# Patient Record
Sex: FEMALE | Race: WHITE | Employment: OTHER | URBAN - METROPOLITAN AREA
[De-identification: names, ages, dates, MRNs, and addresses within clinical notes are randomized per-mention and may not be internally consistent; named-entity substitution may affect disease eponyms.]

---

## 2017-03-14 ENCOUNTER — OFFICE VISIT (OUTPATIENT)
Dept: ONCOLOGY | Age: 67
End: 2017-03-14
Payer: MEDICARE

## 2017-03-14 ENCOUNTER — HOSPITAL ENCOUNTER (OUTPATIENT)
Facility: MEDICAL CENTER | Age: 67
Discharge: HOME | End: 2017-03-14
Payer: MEDICARE

## 2017-03-14 VITALS
DIASTOLIC BLOOD PRESSURE: 83 MMHG | WEIGHT: 178 LBS | RESPIRATION RATE: 16 BRPM | BODY MASS INDEX: 31.15 KG/M2 | HEART RATE: 61 BPM | TEMPERATURE: 97.6 F | SYSTOLIC BLOOD PRESSURE: 147 MMHG

## 2017-03-14 DIAGNOSIS — Z90.13 S/P MASTECTOMY, BILATERAL: Primary | ICD-10-CM

## 2017-03-14 DIAGNOSIS — C50.212 MALIGNANT NEOPLASM OF UPPER-INNER QUADRANT OF LEFT FEMALE BREAST (HCC): ICD-10-CM

## 2017-03-14 DIAGNOSIS — M85.80 OSTEOPENIA: ICD-10-CM

## 2017-03-14 PROCEDURE — G8417 CALC BMI ABV UP PARAM F/U: HCPCS | Performed by: INTERNAL MEDICINE

## 2017-03-14 PROCEDURE — 1036F TOBACCO NON-USER: CPT | Performed by: INTERNAL MEDICINE

## 2017-03-14 PROCEDURE — G8399 PT W/DXA RESULTS DOCUMENT: HCPCS | Performed by: INTERNAL MEDICINE

## 2017-03-14 PROCEDURE — 1123F ACP DISCUSS/DSCN MKR DOCD: CPT | Performed by: INTERNAL MEDICINE

## 2017-03-14 PROCEDURE — G8484 FLU IMMUNIZE NO ADMIN: HCPCS | Performed by: INTERNAL MEDICINE

## 2017-03-14 PROCEDURE — 4040F PNEUMOC VAC/ADMIN/RCVD: CPT | Performed by: INTERNAL MEDICINE

## 2017-03-14 PROCEDURE — G8428 CUR MEDS NOT DOCUMENT: HCPCS | Performed by: INTERNAL MEDICINE

## 2017-03-14 PROCEDURE — 3014F SCREEN MAMMO DOC REV: CPT | Performed by: INTERNAL MEDICINE

## 2017-03-14 PROCEDURE — 1090F PRES/ABSN URINE INCON ASSESS: CPT | Performed by: INTERNAL MEDICINE

## 2017-03-14 PROCEDURE — 3017F COLORECTAL CA SCREEN DOC REV: CPT | Performed by: INTERNAL MEDICINE

## 2017-03-14 PROCEDURE — 99214 OFFICE O/P EST MOD 30 MIN: CPT | Performed by: INTERNAL MEDICINE

## 2017-03-14 RX ORDER — ANASTROZOLE 1 MG/1
1 TABLET ORAL DAILY
Qty: 90 TABLET | Refills: 1 | Status: SHIPPED | OUTPATIENT
Start: 2017-03-14 | End: 2017-07-11 | Stop reason: SDUPTHER

## 2017-03-14 RX ORDER — PHYTONADIONE 5 MG/1
5 TABLET ORAL ONCE
COMMUNITY
End: 2018-07-02 | Stop reason: ALTCHOICE

## 2017-03-14 RX ORDER — SULFAMETHOXAZOLE AND TRIMETHOPRIM 800; 160 MG/1; MG/1
1 TABLET ORAL 2 TIMES DAILY
Qty: 20 TABLET | Refills: 0 | Status: SHIPPED | OUTPATIENT
Start: 2017-03-14 | End: 2017-03-24

## 2017-07-07 ENCOUNTER — HOSPITAL ENCOUNTER (OUTPATIENT)
Age: 67
Discharge: HOME | End: 2017-07-07
Payer: MEDICARE

## 2017-07-07 DIAGNOSIS — C50.212 MALIGNANT NEOPLASM OF UPPER-INNER QUADRANT OF LEFT FEMALE BREAST (HCC): ICD-10-CM

## 2017-07-07 LAB
ABSOLUTE EOS #: 0.3 K/UL (ref 0–0.4)
ABSOLUTE LYMPH #: 2.4 K/UL (ref 1–4.8)
ABSOLUTE MONO #: 0.6 K/UL (ref 0.1–1.3)
ALBUMIN SERPL-MCNC: 4 G/DL (ref 3.5–5.2)
ALBUMIN/GLOBULIN RATIO: ABNORMAL (ref 1–2.5)
ALP BLD-CCNC: 97 U/L (ref 35–104)
ALT SERPL-CCNC: 36 U/L (ref 5–33)
ANION GAP SERPL CALCULATED.3IONS-SCNC: 16 MMOL/L (ref 9–17)
AST SERPL-CCNC: 27 U/L
BASOPHILS # BLD: 1 %
BASOPHILS ABSOLUTE: 0.1 K/UL (ref 0–0.2)
BILIRUB SERPL-MCNC: 0.22 MG/DL (ref 0.3–1.2)
BUN BLDV-MCNC: 25 MG/DL (ref 8–23)
BUN/CREAT BLD: ABNORMAL (ref 9–20)
CALCIUM SERPL-MCNC: 9.5 MG/DL (ref 8.6–10.4)
CHLORIDE BLD-SCNC: 103 MMOL/L (ref 98–107)
CO2: 22 MMOL/L (ref 20–31)
CREAT SERPL-MCNC: 0.63 MG/DL (ref 0.5–0.9)
DIFFERENTIAL TYPE: NORMAL
EOSINOPHILS RELATIVE PERCENT: 3 %
GFR AFRICAN AMERICAN: >60 ML/MIN
GFR NON-AFRICAN AMERICAN: >60 ML/MIN
GFR SERPL CREATININE-BSD FRML MDRD: ABNORMAL ML/MIN/{1.73_M2}
GFR SERPL CREATININE-BSD FRML MDRD: ABNORMAL ML/MIN/{1.73_M2}
GLUCOSE BLD-MCNC: 95 MG/DL (ref 70–99)
HCT VFR BLD CALC: 43.5 % (ref 36–46)
HEMOGLOBIN: 13.7 G/DL (ref 12–16)
LYMPHOCYTES # BLD: 28 %
MCH RBC QN AUTO: 29.9 PG (ref 26–34)
MCHC RBC AUTO-ENTMCNC: 31.6 G/DL (ref 31–37)
MCV RBC AUTO: 94.7 FL (ref 80–100)
MONOCYTES # BLD: 8 %
PDW BLD-RTO: 13.2 % (ref 11.5–14.9)
PLATELET # BLD: 366 K/UL (ref 150–450)
PLATELET ESTIMATE: NORMAL
PMV BLD AUTO: 8.1 FL (ref 6–12)
POTASSIUM SERPL-SCNC: 4.2 MMOL/L (ref 3.7–5.3)
RBC # BLD: 4.59 M/UL (ref 4–5.2)
RBC # BLD: NORMAL 10*6/UL
SEG NEUTROPHILS: 60 %
SEGMENTED NEUTROPHILS ABSOLUTE COUNT: 5.1 K/UL (ref 1.3–9.1)
SODIUM BLD-SCNC: 141 MMOL/L (ref 135–144)
TOTAL PROTEIN: 7.5 G/DL (ref 6.4–8.3)
WBC # BLD: 8.5 K/UL (ref 3.5–11)
WBC # BLD: NORMAL 10*3/UL

## 2017-07-07 PROCEDURE — 36415 COLL VENOUS BLD VENIPUNCTURE: CPT

## 2017-07-07 PROCEDURE — 85025 COMPLETE CBC W/AUTO DIFF WBC: CPT

## 2017-07-07 PROCEDURE — 80053 COMPREHEN METABOLIC PANEL: CPT

## 2017-07-10 PROBLEM — J30.1 SEASONAL ALLERGIC RHINITIS DUE TO POLLEN: Status: ACTIVE | Noted: 2017-07-10

## 2017-07-11 ENCOUNTER — OFFICE VISIT (OUTPATIENT)
Dept: ONCOLOGY | Age: 67
End: 2017-07-11
Payer: MEDICARE

## 2017-07-11 VITALS
TEMPERATURE: 98.4 F | HEART RATE: 50 BPM | RESPIRATION RATE: 16 BRPM | DIASTOLIC BLOOD PRESSURE: 69 MMHG | WEIGHT: 183.5 LBS | HEIGHT: 64 IN | SYSTOLIC BLOOD PRESSURE: 116 MMHG | BODY MASS INDEX: 31.33 KG/M2

## 2017-07-11 DIAGNOSIS — M85.80 OSTEOPENIA: Primary | ICD-10-CM

## 2017-07-11 DIAGNOSIS — C50.212 MALIGNANT NEOPLASM OF UPPER-INNER QUADRANT OF LEFT FEMALE BREAST (HCC): ICD-10-CM

## 2017-07-11 PROCEDURE — G8427 DOCREV CUR MEDS BY ELIG CLIN: HCPCS | Performed by: INTERNAL MEDICINE

## 2017-07-11 PROCEDURE — 1123F ACP DISCUSS/DSCN MKR DOCD: CPT | Performed by: INTERNAL MEDICINE

## 2017-07-11 PROCEDURE — G8399 PT W/DXA RESULTS DOCUMENT: HCPCS | Performed by: INTERNAL MEDICINE

## 2017-07-11 PROCEDURE — 1036F TOBACCO NON-USER: CPT | Performed by: INTERNAL MEDICINE

## 2017-07-11 PROCEDURE — 3014F SCREEN MAMMO DOC REV: CPT | Performed by: INTERNAL MEDICINE

## 2017-07-11 PROCEDURE — G8417 CALC BMI ABV UP PARAM F/U: HCPCS | Performed by: INTERNAL MEDICINE

## 2017-07-11 PROCEDURE — 4040F PNEUMOC VAC/ADMIN/RCVD: CPT | Performed by: INTERNAL MEDICINE

## 2017-07-11 PROCEDURE — 3017F COLORECTAL CA SCREEN DOC REV: CPT | Performed by: INTERNAL MEDICINE

## 2017-07-11 PROCEDURE — 99214 OFFICE O/P EST MOD 30 MIN: CPT | Performed by: INTERNAL MEDICINE

## 2017-07-11 PROCEDURE — 99211 OFF/OP EST MAY X REQ PHY/QHP: CPT | Performed by: NURSE PRACTITIONER

## 2017-07-11 PROCEDURE — 1090F PRES/ABSN URINE INCON ASSESS: CPT | Performed by: INTERNAL MEDICINE

## 2017-07-11 RX ORDER — ANASTROZOLE 1 MG/1
1 TABLET ORAL DAILY
Qty: 90 TABLET | Refills: 3 | Status: SHIPPED | OUTPATIENT
Start: 2017-07-11 | End: 2018-10-08 | Stop reason: SDUPTHER

## 2017-08-22 ENCOUNTER — ANESTHESIA (OUTPATIENT)
Dept: OPERATING ROOM | Age: 67
End: 2017-08-22
Payer: MEDICARE

## 2017-08-22 ENCOUNTER — HOSPITAL ENCOUNTER (OUTPATIENT)
Age: 67
Setting detail: OUTPATIENT SURGERY
Discharge: HOME | End: 2017-08-22
Attending: SURGERY | Admitting: SURGERY
Payer: MEDICARE

## 2017-08-22 ENCOUNTER — ANESTHESIA EVENT (OUTPATIENT)
Dept: OPERATING ROOM | Age: 67
End: 2017-08-22
Payer: MEDICARE

## 2017-08-22 VITALS
TEMPERATURE: 98 F | RESPIRATION RATE: 16 BRPM | HEART RATE: 46 BPM | DIASTOLIC BLOOD PRESSURE: 75 MMHG | HEIGHT: 63 IN | SYSTOLIC BLOOD PRESSURE: 138 MMHG | OXYGEN SATURATION: 98 % | BODY MASS INDEX: 31.01 KG/M2 | WEIGHT: 175 LBS

## 2017-08-22 VITALS — OXYGEN SATURATION: 97 % | SYSTOLIC BLOOD PRESSURE: 106 MMHG | DIASTOLIC BLOOD PRESSURE: 55 MMHG

## 2017-08-22 PROCEDURE — 2500000003 HC RX 250 WO HCPCS: Performed by: ANESTHESIOLOGY

## 2017-08-22 PROCEDURE — 7100000000 HC PACU RECOVERY - FIRST 15 MIN: Performed by: SURGERY

## 2017-08-22 PROCEDURE — 2580000003 HC RX 258: Performed by: ANESTHESIOLOGY

## 2017-08-22 PROCEDURE — 7100000010 HC PHASE II RECOVERY - FIRST 15 MIN: Performed by: SURGERY

## 2017-08-22 PROCEDURE — 7100000031 HC ASPR PHASE II RECOVERY - ADDTL 15 MIN: Performed by: SURGERY

## 2017-08-22 PROCEDURE — 7100000011 HC PHASE II RECOVERY - ADDTL 15 MIN: Performed by: SURGERY

## 2017-08-22 PROCEDURE — 6360000002 HC RX W HCPCS: Performed by: ANESTHESIOLOGY

## 2017-08-22 PROCEDURE — 2580000003 HC RX 258: Performed by: SURGERY

## 2017-08-22 PROCEDURE — 7100000030 HC ASPR PHASE II RECOVERY - FIRST 15 MIN: Performed by: SURGERY

## 2017-08-22 PROCEDURE — 7100000001 HC PACU RECOVERY - ADDTL 15 MIN: Performed by: SURGERY

## 2017-08-22 PROCEDURE — 3609027000 HC COLONOSCOPY: Performed by: SURGERY

## 2017-08-22 PROCEDURE — 3700000000 HC ANESTHESIA ATTENDED CARE: Performed by: SURGERY

## 2017-08-22 PROCEDURE — 3700000001 HC ADD 15 MINUTES (ANESTHESIA): Performed by: SURGERY

## 2017-08-22 RX ORDER — SODIUM CHLORIDE, SODIUM LACTATE, POTASSIUM CHLORIDE, CALCIUM CHLORIDE 600; 310; 30; 20 MG/100ML; MG/100ML; MG/100ML; MG/100ML
INJECTION, SOLUTION INTRAVENOUS CONTINUOUS PRN
Status: DISCONTINUED | OUTPATIENT
Start: 2017-08-22 | End: 2017-08-22 | Stop reason: SDUPTHER

## 2017-08-22 RX ORDER — SODIUM CHLORIDE, SODIUM LACTATE, POTASSIUM CHLORIDE, CALCIUM CHLORIDE 600; 310; 30; 20 MG/100ML; MG/100ML; MG/100ML; MG/100ML
INJECTION, SOLUTION INTRAVENOUS CONTINUOUS
Status: DISCONTINUED | OUTPATIENT
Start: 2017-08-22 | End: 2017-08-22 | Stop reason: HOSPADM

## 2017-08-22 RX ORDER — PROPOFOL 10 MG/ML
INJECTION, EMULSION INTRAVENOUS PRN
Status: DISCONTINUED | OUTPATIENT
Start: 2017-08-22 | End: 2017-08-22 | Stop reason: SDUPTHER

## 2017-08-22 RX ORDER — LIDOCAINE HYDROCHLORIDE 20 MG/ML
INJECTION, SOLUTION EPIDURAL; INFILTRATION; INTRACAUDAL; PERINEURAL PRN
Status: DISCONTINUED | OUTPATIENT
Start: 2017-08-22 | End: 2017-08-22 | Stop reason: SDUPTHER

## 2017-08-22 RX ADMIN — PROPOFOL 25 MG: 10 INJECTION, EMULSION INTRAVENOUS at 10:10

## 2017-08-22 RX ADMIN — SODIUM CHLORIDE, POTASSIUM CHLORIDE, SODIUM LACTATE AND CALCIUM CHLORIDE: 600; 310; 30; 20 INJECTION, SOLUTION INTRAVENOUS at 09:24

## 2017-08-22 RX ADMIN — SODIUM CHLORIDE, POTASSIUM CHLORIDE, SODIUM LACTATE AND CALCIUM CHLORIDE: 600; 310; 30; 20 INJECTION, SOLUTION INTRAVENOUS at 09:56

## 2017-08-22 RX ADMIN — LIDOCAINE HYDROCHLORIDE 2 ML: 20 INJECTION, SOLUTION EPIDURAL; INFILTRATION; INTRACAUDAL; PERINEURAL at 10:04

## 2017-08-22 RX ADMIN — PROPOFOL 50 MG: 10 INJECTION, EMULSION INTRAVENOUS at 10:25

## 2017-08-22 RX ADMIN — PROPOFOL 25 MG: 10 INJECTION, EMULSION INTRAVENOUS at 10:20

## 2017-08-22 RX ADMIN — PROPOFOL 25 MG: 10 INJECTION, EMULSION INTRAVENOUS at 10:05

## 2017-08-22 RX ADMIN — PROPOFOL 100 MG: 10 INJECTION, EMULSION INTRAVENOUS at 10:03

## 2017-08-22 RX ADMIN — PROPOFOL 25 MG: 10 INJECTION, EMULSION INTRAVENOUS at 10:15

## 2017-08-22 RX ADMIN — PROPOFOL 50 MG: 10 INJECTION, EMULSION INTRAVENOUS at 10:30

## 2017-08-22 ASSESSMENT — PAIN SCALES - GENERAL
PAINLEVEL_OUTOF10: 0

## 2017-08-22 ASSESSMENT — PAIN - FUNCTIONAL ASSESSMENT: PAIN_FUNCTIONAL_ASSESSMENT: 0-10

## 2017-10-26 ENCOUNTER — OFFICE VISIT (OUTPATIENT)
Dept: ONCOLOGY | Age: 67
End: 2017-10-26
Payer: MEDICARE

## 2017-10-26 VITALS
DIASTOLIC BLOOD PRESSURE: 67 MMHG | RESPIRATION RATE: 16 BRPM | SYSTOLIC BLOOD PRESSURE: 129 MMHG | TEMPERATURE: 97.1 F | HEART RATE: 52 BPM | WEIGHT: 179.9 LBS | BODY MASS INDEX: 31.88 KG/M2

## 2017-10-26 DIAGNOSIS — C50.212 MALIGNANT NEOPLASM OF UPPER-INNER QUADRANT OF LEFT BREAST IN FEMALE, ESTROGEN RECEPTOR POSITIVE (HCC): Primary | ICD-10-CM

## 2017-10-26 DIAGNOSIS — Z17.0 MALIGNANT NEOPLASM OF UPPER-INNER QUADRANT OF LEFT BREAST IN FEMALE, ESTROGEN RECEPTOR POSITIVE (HCC): Primary | ICD-10-CM

## 2017-10-26 PROCEDURE — 1123F ACP DISCUSS/DSCN MKR DOCD: CPT | Performed by: INTERNAL MEDICINE

## 2017-10-26 PROCEDURE — G8417 CALC BMI ABV UP PARAM F/U: HCPCS | Performed by: INTERNAL MEDICINE

## 2017-10-26 PROCEDURE — G8484 FLU IMMUNIZE NO ADMIN: HCPCS | Performed by: INTERNAL MEDICINE

## 2017-10-26 PROCEDURE — 1036F TOBACCO NON-USER: CPT | Performed by: INTERNAL MEDICINE

## 2017-10-26 PROCEDURE — 99214 OFFICE O/P EST MOD 30 MIN: CPT | Performed by: INTERNAL MEDICINE

## 2017-10-26 PROCEDURE — 3014F SCREEN MAMMO DOC REV: CPT | Performed by: INTERNAL MEDICINE

## 2017-10-26 PROCEDURE — 4040F PNEUMOC VAC/ADMIN/RCVD: CPT | Performed by: INTERNAL MEDICINE

## 2017-10-26 PROCEDURE — 1090F PRES/ABSN URINE INCON ASSESS: CPT | Performed by: INTERNAL MEDICINE

## 2017-10-26 PROCEDURE — G8399 PT W/DXA RESULTS DOCUMENT: HCPCS | Performed by: INTERNAL MEDICINE

## 2017-10-26 PROCEDURE — 99211 OFF/OP EST MAY X REQ PHY/QHP: CPT

## 2017-10-26 PROCEDURE — 3017F COLORECTAL CA SCREEN DOC REV: CPT | Performed by: INTERNAL MEDICINE

## 2017-10-26 PROCEDURE — G8427 DOCREV CUR MEDS BY ELIG CLIN: HCPCS | Performed by: INTERNAL MEDICINE

## 2018-02-01 ENCOUNTER — OFFICE VISIT (OUTPATIENT)
Dept: ONCOLOGY | Age: 68
End: 2018-02-01
Payer: MEDICARE

## 2018-02-01 VITALS
RESPIRATION RATE: 16 BRPM | DIASTOLIC BLOOD PRESSURE: 72 MMHG | SYSTOLIC BLOOD PRESSURE: 132 MMHG | BODY MASS INDEX: 31.7 KG/M2 | WEIGHT: 178.9 LBS | TEMPERATURE: 98.3 F | HEART RATE: 56 BPM

## 2018-02-01 DIAGNOSIS — M85.89 OSTEOPENIA OF MULTIPLE SITES: ICD-10-CM

## 2018-02-01 DIAGNOSIS — Z17.0 MALIGNANT NEOPLASM OF UPPER-INNER QUADRANT OF LEFT BREAST IN FEMALE, ESTROGEN RECEPTOR POSITIVE (HCC): Primary | ICD-10-CM

## 2018-02-01 DIAGNOSIS — C50.212 MALIGNANT NEOPLASM OF UPPER-INNER QUADRANT OF LEFT BREAST IN FEMALE, ESTROGEN RECEPTOR POSITIVE (HCC): Primary | ICD-10-CM

## 2018-02-01 PROCEDURE — G8417 CALC BMI ABV UP PARAM F/U: HCPCS | Performed by: INTERNAL MEDICINE

## 2018-02-01 PROCEDURE — G8427 DOCREV CUR MEDS BY ELIG CLIN: HCPCS | Performed by: INTERNAL MEDICINE

## 2018-02-01 PROCEDURE — G8399 PT W/DXA RESULTS DOCUMENT: HCPCS | Performed by: INTERNAL MEDICINE

## 2018-02-01 PROCEDURE — 1123F ACP DISCUSS/DSCN MKR DOCD: CPT | Performed by: INTERNAL MEDICINE

## 2018-02-01 PROCEDURE — 1090F PRES/ABSN URINE INCON ASSESS: CPT | Performed by: INTERNAL MEDICINE

## 2018-02-01 PROCEDURE — G8484 FLU IMMUNIZE NO ADMIN: HCPCS | Performed by: INTERNAL MEDICINE

## 2018-02-01 PROCEDURE — 3017F COLORECTAL CA SCREEN DOC REV: CPT | Performed by: INTERNAL MEDICINE

## 2018-02-01 PROCEDURE — 3014F SCREEN MAMMO DOC REV: CPT | Performed by: INTERNAL MEDICINE

## 2018-02-01 PROCEDURE — 99211 OFF/OP EST MAY X REQ PHY/QHP: CPT

## 2018-02-01 PROCEDURE — 99214 OFFICE O/P EST MOD 30 MIN: CPT | Performed by: INTERNAL MEDICINE

## 2018-02-01 PROCEDURE — 4040F PNEUMOC VAC/ADMIN/RCVD: CPT | Performed by: INTERNAL MEDICINE

## 2018-02-01 PROCEDURE — 1036F TOBACCO NON-USER: CPT | Performed by: INTERNAL MEDICINE

## 2018-02-01 NOTE — PROGRESS NOTES
supplementation. She continues to follow with Dr. Anahi Roe for reconstruction. GYNECOLOGICAL HISTORY  Menarche at age 7. Menaupause at age 40. +1. Age at first full term pregnancy 25. No use of HRT. PAST MEDICAL HISTORY: has a past medical history of Chest pain; History of bilateral breast cancer; Short of breath on exertion; and Urinary frequency. PAST SURGICAL HISTORY: has past surgical history that includes Splenectomy (N/A); Appendectomy; Colon surgery; Hysterectomy; MONA/BSO    CURRENT MEDICATIONS:  has a current medication list which includes the following prescription(s): anastrozole, nato root, phytonadione, NONFORMULARY, multiple vitamins-minerals, fexofenadine, ibuprofen, aspirin, calcium & magnesium carbonates, NONFORMULARY, multiple vitamins-minerals, psyllium, choline, NONFORMULARY, and omega-3 fatty acids. ALLERGIES:  is allergic to ciprofloxacin hcl; darvon [propoxyphene]; levaquin [levofloxacin in d5w]; other; and penicillins. FAMILY HISTORY: There is an history of breast cancer in her sister in her 46s, maternal aunt in her 76s and 2 cousins in their 46s. SOCIAL HISTORY:  reports that she has never smoked. She has never used smokeless tobacco. She reports that she does not drink alcohol or use drugs. REVIEW OF SYSTEMS:     General: no fever or night sweats, Weight is stable, some weight distribution. Grade 1 fatigue. Mild hot flashes  ENT: No double or blurred vision, no tinnitus or hearing problem, no dysphagia or sore throat. URI: sinus pressure, congestion  Respiratory: No chest pain, no shortness of breath, no cough or hemoptysis. Cardiovascular: Denies chest pain, PND or orthopnea. No L E swelling or palpitations. Gastrointestinal: No nausea or vomiting, abdominal pain, diarrhea or constipation. Genitourinary: Denies dysuria, hematuria, frequency, urgency or incontinence.    Neurological: Denies headaches, decreased LOC, no sensory or motor focal deficits. Musculoskeletal: No joint swelling. Muscle and joint pain - intermittent. Leg cramping and burning sensation in feet. Skin: There are no rashes or bleeding. Psychiatric:  No anxiety, no depression. Insomnia   Endocrine: No diabetes or thyroid disease. Hematologic: No bleeding, no adenopathy. PHYSICAL EXAM:  The patient is not in acute distress. Vital signs: Blood pressure 132/72, pulse 56, temperature 98.3 °F (36.8 °C), temperature source Oral, resp. rate 16, weight 178 lb 14.4 oz (81.1 kg), last menstrual period 10/28/2000, not currently breastfeeding. HEENT:  Eyes are normal. Ears, nose and throat are normal.  Neck: Supple. No lymph node enlargement. No thyroid enlargement. Trachea is centrally located. Chest:  Clear to auscultation. No wheezes or crepitations. Breast: S/P bilateral mastectomy, well healed, bilateral reconstruction implants, no evidence of recurrence, no lymphadenopathy, no nodularity, decreased sensation in both breasts, no lymphedema. Left: lower edge of reconstruction small skin scar, no evidence of mass Heart: Regular sinus rhythm. Abdomen: Soft, nontender. No hepatosplenomegaly. No masses. Midline incision (splenectomy and partial colectomy)   Extremities:  No edema. Lymph Nodes:  No cervical, axillary or inguinal lymph node enlargement or adenopathy. Neurologic:  Conscious and oriented. No focal neurological deficits. Psychosocial: No depression, anxiety or stress. Skin: No rashes, bruises or ecchymoses. REVIEW OF RADIOLOGICAL RESULTS:      DEXA Bone Density 2 Sites 05/31/2016 Impression:  1. Lumbar spine:   L1-L4 BMD: 1.124 g/cm2, young adult/T.-score: -0.6. Age-matched Z.-score: 0.4   There has been 3.0 percent decrease in lumbar BMD since the prior study. 2 - left hip:   Total: 0.901g/cm2, young adult/T.-score: -0.9. Age-matched Z.-score: -0.1. Neck:0.887g/cm2, young adult/T.-score:-1.1. Age-matched Z.-score: -0.1.      REVIEW OF LABORATORY DATA:  Lab Results   Component Value Date    WBC 8.5 07/07/2017    HGB 13.7 07/07/2017    HCT 43.5 07/07/2017    MCV 94.7 07/07/2017     07/07/2017       Chemistry        Component Value Date/Time     07/07/2017 1522    K 4.2 07/07/2017 1522     07/07/2017 1522    CO2 22 07/07/2017 1522    BUN 25 (H) 07/07/2017 1522    CREATININE 0.63 07/07/2017 1522        Component Value Date/Time    CALCIUM 9.5 07/07/2017 1522    ALKPHOS 97 07/07/2017 1522    AST 27 07/07/2017 1522    ALT 36 (H) 07/07/2017 1522    BILITOT 0.22 (L) 07/07/2017 1522             IMPRESSION:   1. Stage I (T1b,sN0,M0) ER and AK positive breast cancer. HER-2/jose d is negative on the final specimen  2. 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 S/P bilateral mastectomy. 4. Side effects of Arimidex as discussed. Seems to be well tolerated   5. Osteopenia. On calcium and vitamin D   6. Clinical sinusitis       PLAN:   1. She continues 2 years in remission, no evidence of recurrence. 2. We will transition to twice yearly follow up. 3. Continue with Armidex unchanged. 4. I offered PT and MRI for further work up on leg cramp and foot pain, she declined at this time. 5. She will need follow up DEXA for 05/2018 and I will order. 6. Return in 6 months for follow up and to review DEXA results.

## 2018-07-02 ENCOUNTER — HOSPITAL ENCOUNTER (OUTPATIENT)
Dept: PREADMISSION TESTING | Age: 68
Discharge: HOME | End: 2018-07-06
Payer: MEDICARE

## 2018-07-02 VITALS
HEART RATE: 48 BPM | HEIGHT: 63 IN | OXYGEN SATURATION: 98 % | SYSTOLIC BLOOD PRESSURE: 127 MMHG | DIASTOLIC BLOOD PRESSURE: 78 MMHG | WEIGHT: 170 LBS | RESPIRATION RATE: 16 BRPM | TEMPERATURE: 97.2 F | BODY MASS INDEX: 30.12 KG/M2

## 2018-07-02 LAB
ABSOLUTE EOS #: 0.2 K/UL (ref 0–0.4)
ABSOLUTE IMMATURE GRANULOCYTE: ABNORMAL K/UL (ref 0–0.3)
ABSOLUTE LYMPH #: 1.7 K/UL (ref 1–4.8)
ABSOLUTE MONO #: 0.5 K/UL (ref 0.1–1.3)
ANION GAP SERPL CALCULATED.3IONS-SCNC: 13 MMOL/L (ref 9–17)
BASOPHILS # BLD: 1 % (ref 0–2)
BASOPHILS ABSOLUTE: 0.1 K/UL (ref 0–0.2)
BUN BLDV-MCNC: 18 MG/DL (ref 8–23)
BUN/CREAT BLD: NORMAL (ref 9–20)
CALCIUM SERPL-MCNC: 9.6 MG/DL (ref 8.6–10.4)
CHLORIDE BLD-SCNC: 104 MMOL/L (ref 98–107)
CO2: 25 MMOL/L (ref 20–31)
CREAT SERPL-MCNC: 0.67 MG/DL (ref 0.5–0.9)
DIFFERENTIAL TYPE: ABNORMAL
EKG ATRIAL RATE: 45 BPM
EKG P AXIS: 44 DEGREES
EKG P-R INTERVAL: 152 MS
EKG Q-T INTERVAL: 460 MS
EKG QRS DURATION: 110 MS
EKG QTC CALCULATION (BAZETT): 397 MS
EKG R AXIS: 50 DEGREES
EKG T AXIS: 61 DEGREES
EKG VENTRICULAR RATE: 45 BPM
EOSINOPHILS RELATIVE PERCENT: 3 % (ref 0–4)
GFR AFRICAN AMERICAN: >60 ML/MIN
GFR NON-AFRICAN AMERICAN: >60 ML/MIN
GFR SERPL CREATININE-BSD FRML MDRD: NORMAL ML/MIN/{1.73_M2}
GFR SERPL CREATININE-BSD FRML MDRD: NORMAL ML/MIN/{1.73_M2}
GLUCOSE BLD-MCNC: 94 MG/DL (ref 70–99)
HCT VFR BLD CALC: 42.5 % (ref 36–46)
HEMOGLOBIN: 14.1 G/DL (ref 12–16)
IMMATURE GRANULOCYTES: ABNORMAL %
LYMPHOCYTES # BLD: 31 % (ref 24–44)
MCH RBC QN AUTO: 30.1 PG (ref 26–34)
MCHC RBC AUTO-ENTMCNC: 33.1 G/DL (ref 31–37)
MCV RBC AUTO: 90.8 FL (ref 80–100)
MONOCYTES # BLD: 8 % (ref 1–7)
NRBC AUTOMATED: ABNORMAL PER 100 WBC
PDW BLD-RTO: 12.6 % (ref 11.5–14.9)
PLATELET # BLD: 356 K/UL (ref 150–450)
PLATELET ESTIMATE: ABNORMAL
PMV BLD AUTO: 8.3 FL (ref 6–12)
POTASSIUM SERPL-SCNC: 4.1 MMOL/L (ref 3.7–5.3)
RBC # BLD: 4.68 M/UL (ref 4–5.2)
RBC # BLD: ABNORMAL 10*6/UL
SEG NEUTROPHILS: 57 % (ref 36–66)
SEGMENTED NEUTROPHILS ABSOLUTE COUNT: 3.2 K/UL (ref 1.3–9.1)
SODIUM BLD-SCNC: 142 MMOL/L (ref 135–144)
WBC # BLD: 5.6 K/UL (ref 3.5–11)
WBC # BLD: ABNORMAL 10*3/UL

## 2018-07-02 PROCEDURE — 85025 COMPLETE CBC W/AUTO DIFF WBC: CPT

## 2018-07-02 PROCEDURE — 93005 ELECTROCARDIOGRAM TRACING: CPT

## 2018-07-02 PROCEDURE — 80048 BASIC METABOLIC PNL TOTAL CA: CPT

## 2018-07-02 PROCEDURE — 36415 COLL VENOUS BLD VENIPUNCTURE: CPT

## 2018-07-02 RX ORDER — ASPIRIN 81 MG/1
81 TABLET, CHEWABLE ORAL DAILY
COMMUNITY

## 2018-07-03 ENCOUNTER — ANESTHESIA EVENT (OUTPATIENT)
Dept: OPERATING ROOM | Age: 68
End: 2018-07-03
Payer: MEDICARE

## 2018-07-03 RX ORDER — SODIUM CHLORIDE 0.9 % (FLUSH) 0.9 %
10 SYRINGE (ML) INJECTION EVERY 12 HOURS SCHEDULED
Status: CANCELLED | OUTPATIENT
Start: 2018-07-03

## 2018-07-03 RX ORDER — SODIUM CHLORIDE, SODIUM LACTATE, POTASSIUM CHLORIDE, CALCIUM CHLORIDE 600; 310; 30; 20 MG/100ML; MG/100ML; MG/100ML; MG/100ML
INJECTION, SOLUTION INTRAVENOUS CONTINUOUS
Status: CANCELLED | OUTPATIENT
Start: 2018-07-03

## 2018-07-03 RX ORDER — LIDOCAINE HYDROCHLORIDE 10 MG/ML
1 INJECTION, SOLUTION EPIDURAL; INFILTRATION; INTRACAUDAL; PERINEURAL
Status: CANCELLED | OUTPATIENT
Start: 2018-07-03 | End: 2018-07-03

## 2018-07-03 RX ORDER — SODIUM CHLORIDE 0.9 % (FLUSH) 0.9 %
10 SYRINGE (ML) INJECTION PRN
Status: CANCELLED | OUTPATIENT
Start: 2018-07-03

## 2018-07-11 ENCOUNTER — ANESTHESIA (OUTPATIENT)
Dept: OPERATING ROOM | Age: 68
End: 2018-07-11
Payer: MEDICARE

## 2018-07-11 ENCOUNTER — HOSPITAL ENCOUNTER (OUTPATIENT)
Age: 68
Setting detail: OUTPATIENT SURGERY
Discharge: HOME | End: 2018-07-11
Attending: SURGERY | Admitting: SURGERY
Payer: MEDICARE

## 2018-07-11 VITALS
BODY MASS INDEX: 30.12 KG/M2 | SYSTOLIC BLOOD PRESSURE: 115 MMHG | OXYGEN SATURATION: 93 % | WEIGHT: 170 LBS | TEMPERATURE: 97.6 F | HEIGHT: 63 IN | DIASTOLIC BLOOD PRESSURE: 64 MMHG | RESPIRATION RATE: 16 BRPM | HEART RATE: 57 BPM

## 2018-07-11 VITALS — SYSTOLIC BLOOD PRESSURE: 100 MMHG | OXYGEN SATURATION: 98 % | DIASTOLIC BLOOD PRESSURE: 56 MMHG | TEMPERATURE: 95.9 F

## 2018-07-11 LAB — SURGICAL PATHOLOGY REPORT: NORMAL

## 2018-07-11 PROCEDURE — 3700000000 HC ANESTHESIA ATTENDED CARE: Performed by: SURGERY

## 2018-07-11 PROCEDURE — 2500000003 HC RX 250 WO HCPCS: Performed by: SURGERY

## 2018-07-11 PROCEDURE — 6360000002 HC RX W HCPCS: Performed by: SURGERY

## 2018-07-11 PROCEDURE — 2500000003 HC RX 250 WO HCPCS

## 2018-07-11 PROCEDURE — 6370000000 HC RX 637 (ALT 250 FOR IP): Performed by: ANESTHESIOLOGY

## 2018-07-11 PROCEDURE — 3600000002 HC SURGERY LEVEL 2 BASE: Performed by: SURGERY

## 2018-07-11 PROCEDURE — 3600000012 HC SURGERY LEVEL 2 ADDTL 15MIN: Performed by: SURGERY

## 2018-07-11 PROCEDURE — 2700000000 HC OXYGEN THERAPY PER DAY

## 2018-07-11 PROCEDURE — 2580000003 HC RX 258: Performed by: ANESTHESIOLOGY

## 2018-07-11 PROCEDURE — 2580000003 HC RX 258: Performed by: SURGERY

## 2018-07-11 PROCEDURE — 2740000010 HC MISC ORTHOTIC: Performed by: SURGERY

## 2018-07-11 PROCEDURE — 7100000000 HC PACU RECOVERY - FIRST 15 MIN: Performed by: SURGERY

## 2018-07-11 PROCEDURE — 7100000001 HC PACU RECOVERY - ADDTL 15 MIN: Performed by: SURGERY

## 2018-07-11 PROCEDURE — 6360000002 HC RX W HCPCS

## 2018-07-11 PROCEDURE — 88300 SURGICAL PATH GROSS: CPT

## 2018-07-11 PROCEDURE — 7100000030 HC ASPR PHASE II RECOVERY - FIRST 15 MIN: Performed by: SURGERY

## 2018-07-11 PROCEDURE — C1760 CLOSURE DEV, VASC: HCPCS | Performed by: SURGERY

## 2018-07-11 PROCEDURE — 3700000001 HC ADD 15 MINUTES (ANESTHESIA): Performed by: SURGERY

## 2018-07-11 PROCEDURE — 6360000002 HC RX W HCPCS: Performed by: ANESTHESIOLOGY

## 2018-07-11 PROCEDURE — 7100000031 HC ASPR PHASE II RECOVERY - ADDTL 15 MIN: Performed by: SURGERY

## 2018-07-11 RX ORDER — SODIUM CHLORIDE 0.9 % (FLUSH) 0.9 %
10 SYRINGE (ML) INJECTION PRN
Status: DISCONTINUED | OUTPATIENT
Start: 2018-07-11 | End: 2018-07-11 | Stop reason: HOSPADM

## 2018-07-11 RX ORDER — SODIUM CHLORIDE, SODIUM LACTATE, POTASSIUM CHLORIDE, CALCIUM CHLORIDE 600; 310; 30; 20 MG/100ML; MG/100ML; MG/100ML; MG/100ML
INJECTION, SOLUTION INTRAVENOUS CONTINUOUS
Status: DISCONTINUED | OUTPATIENT
Start: 2018-07-11 | End: 2018-07-11 | Stop reason: HOSPADM

## 2018-07-11 RX ORDER — EPHEDRINE SULFATE 50 MG/ML
INJECTION, SOLUTION INTRAVENOUS PRN
Status: DISCONTINUED | OUTPATIENT
Start: 2018-07-11 | End: 2018-07-11 | Stop reason: SDUPTHER

## 2018-07-11 RX ORDER — BUPIVACAINE HYDROCHLORIDE 5 MG/ML
INJECTION, SOLUTION EPIDURAL; INTRACAUDAL PRN
Status: DISCONTINUED | OUTPATIENT
Start: 2018-07-11 | End: 2018-07-11 | Stop reason: HOSPADM

## 2018-07-11 RX ORDER — CLINDAMYCIN PHOSPHATE 150 MG/ML
INJECTION, SOLUTION INTRAVENOUS
Status: DISCONTINUED
Start: 2018-07-11 | End: 2018-07-11 | Stop reason: HOSPADM

## 2018-07-11 RX ORDER — DIPHENHYDRAMINE HYDROCHLORIDE 50 MG/ML
12.5 INJECTION INTRAMUSCULAR; INTRAVENOUS
Status: DISCONTINUED | OUTPATIENT
Start: 2018-07-11 | End: 2018-07-11 | Stop reason: HOSPADM

## 2018-07-11 RX ORDER — ACETAMINOPHEN 325 MG/1
650 TABLET ORAL EVERY 4 HOURS PRN
Status: DISCONTINUED | OUTPATIENT
Start: 2018-07-11 | End: 2018-07-11 | Stop reason: HOSPADM

## 2018-07-11 RX ORDER — HYDRALAZINE HYDROCHLORIDE 20 MG/ML
5 INJECTION INTRAMUSCULAR; INTRAVENOUS EVERY 10 MIN PRN
Status: DISCONTINUED | OUTPATIENT
Start: 2018-07-11 | End: 2018-07-11 | Stop reason: HOSPADM

## 2018-07-11 RX ORDER — CEFAZOLIN SODIUM 1 G/3ML
INJECTION, POWDER, FOR SOLUTION INTRAMUSCULAR; INTRAVENOUS
Status: DISCONTINUED
Start: 2018-07-11 | End: 2018-07-11 | Stop reason: HOSPADM

## 2018-07-11 RX ORDER — ONDANSETRON 2 MG/ML
4 INJECTION INTRAMUSCULAR; INTRAVENOUS
Status: DISCONTINUED | OUTPATIENT
Start: 2018-07-11 | End: 2018-07-11 | Stop reason: HOSPADM

## 2018-07-11 RX ORDER — HYDROCODONE BITARTRATE AND ACETAMINOPHEN 5; 325 MG/1; MG/1
2 TABLET ORAL PRN
Status: COMPLETED | OUTPATIENT
Start: 2018-07-11 | End: 2018-07-11

## 2018-07-11 RX ORDER — SODIUM CHLORIDE 0.9 % (FLUSH) 0.9 %
10 SYRINGE (ML) INJECTION EVERY 12 HOURS SCHEDULED
Status: DISCONTINUED | OUTPATIENT
Start: 2018-07-11 | End: 2018-07-11 | Stop reason: HOSPADM

## 2018-07-11 RX ORDER — GLYCOPYRROLATE 1 MG/5 ML
SYRINGE (ML) INTRAVENOUS PRN
Status: DISCONTINUED | OUTPATIENT
Start: 2018-07-11 | End: 2018-07-11 | Stop reason: SDUPTHER

## 2018-07-11 RX ORDER — FENTANYL CITRATE 50 UG/ML
25 INJECTION, SOLUTION INTRAMUSCULAR; INTRAVENOUS EVERY 5 MIN PRN
Status: DISCONTINUED | OUTPATIENT
Start: 2018-07-11 | End: 2018-07-11 | Stop reason: HOSPADM

## 2018-07-11 RX ORDER — DEXAMETHASONE SODIUM PHOSPHATE 4 MG/ML
INJECTION, SOLUTION INTRA-ARTICULAR; INTRALESIONAL; INTRAMUSCULAR; INTRAVENOUS; SOFT TISSUE PRN
Status: DISCONTINUED | OUTPATIENT
Start: 2018-07-11 | End: 2018-07-11 | Stop reason: SDUPTHER

## 2018-07-11 RX ORDER — METOCLOPRAMIDE HYDROCHLORIDE 5 MG/ML
10 INJECTION INTRAMUSCULAR; INTRAVENOUS
Status: DISCONTINUED | OUTPATIENT
Start: 2018-07-11 | End: 2018-07-11 | Stop reason: HOSPADM

## 2018-07-11 RX ORDER — PROPOFOL 10 MG/ML
INJECTION, EMULSION INTRAVENOUS PRN
Status: DISCONTINUED | OUTPATIENT
Start: 2018-07-11 | End: 2018-07-11 | Stop reason: SDUPTHER

## 2018-07-11 RX ORDER — HYDROMORPHONE HCL 110MG/55ML
PATIENT CONTROLLED ANALGESIA SYRINGE INTRAVENOUS PRN
Status: DISCONTINUED | OUTPATIENT
Start: 2018-07-11 | End: 2018-07-11 | Stop reason: SDUPTHER

## 2018-07-11 RX ORDER — MEPERIDINE HYDROCHLORIDE 50 MG/ML
12.5 INJECTION INTRAMUSCULAR; INTRAVENOUS; SUBCUTANEOUS EVERY 5 MIN PRN
Status: DISCONTINUED | OUTPATIENT
Start: 2018-07-11 | End: 2018-07-11 | Stop reason: HOSPADM

## 2018-07-11 RX ORDER — LABETALOL HYDROCHLORIDE 5 MG/ML
5 INJECTION, SOLUTION INTRAVENOUS EVERY 10 MIN PRN
Status: DISCONTINUED | OUTPATIENT
Start: 2018-07-11 | End: 2018-07-11 | Stop reason: HOSPADM

## 2018-07-11 RX ORDER — FENTANYL CITRATE 50 UG/ML
50 INJECTION, SOLUTION INTRAMUSCULAR; INTRAVENOUS EVERY 5 MIN PRN
Status: DISCONTINUED | OUTPATIENT
Start: 2018-07-11 | End: 2018-07-11 | Stop reason: HOSPADM

## 2018-07-11 RX ORDER — IBUPROFEN 200 MG
400 TABLET ORAL EVERY 6 HOURS PRN
Status: DISCONTINUED | OUTPATIENT
Start: 2018-07-11 | End: 2018-07-11 | Stop reason: HOSPADM

## 2018-07-11 RX ORDER — MIDAZOLAM HYDROCHLORIDE 1 MG/ML
INJECTION INTRAMUSCULAR; INTRAVENOUS PRN
Status: DISCONTINUED | OUTPATIENT
Start: 2018-07-11 | End: 2018-07-11 | Stop reason: SDUPTHER

## 2018-07-11 RX ORDER — FENTANYL CITRATE 50 UG/ML
INJECTION, SOLUTION INTRAMUSCULAR; INTRAVENOUS PRN
Status: DISCONTINUED | OUTPATIENT
Start: 2018-07-11 | End: 2018-07-11 | Stop reason: SDUPTHER

## 2018-07-11 RX ORDER — MORPHINE SULFATE 2 MG/ML
2 INJECTION, SOLUTION INTRAMUSCULAR; INTRAVENOUS EVERY 5 MIN PRN
Status: DISCONTINUED | OUTPATIENT
Start: 2018-07-11 | End: 2018-07-11 | Stop reason: HOSPADM

## 2018-07-11 RX ORDER — LIDOCAINE HYDROCHLORIDE 10 MG/ML
1 INJECTION, SOLUTION EPIDURAL; INFILTRATION; INTRACAUDAL; PERINEURAL
Status: DISCONTINUED | OUTPATIENT
Start: 2018-07-11 | End: 2018-07-11 | Stop reason: HOSPADM

## 2018-07-11 RX ORDER — HYDROCODONE BITARTRATE AND ACETAMINOPHEN 5; 325 MG/1; MG/1
1 TABLET ORAL PRN
Status: COMPLETED | OUTPATIENT
Start: 2018-07-11 | End: 2018-07-11

## 2018-07-11 RX ORDER — ONDANSETRON 2 MG/ML
INJECTION INTRAMUSCULAR; INTRAVENOUS PRN
Status: DISCONTINUED | OUTPATIENT
Start: 2018-07-11 | End: 2018-07-11 | Stop reason: SDUPTHER

## 2018-07-11 RX ADMIN — FENTANYL CITRATE 25 MCG: 50 INJECTION, SOLUTION INTRAMUSCULAR; INTRAVENOUS at 11:02

## 2018-07-11 RX ADMIN — HYDROCODONE BITARTRATE AND ACETAMINOPHEN 2 TABLET: 5; 325 TABLET ORAL at 11:51

## 2018-07-11 RX ADMIN — EPHEDRINE SULFATE 10 MG: 50 INJECTION INTRAMUSCULAR; INTRAVENOUS; SUBCUTANEOUS at 08:34

## 2018-07-11 RX ADMIN — EPHEDRINE SULFATE 5 MG: 50 INJECTION INTRAMUSCULAR; INTRAVENOUS; SUBCUTANEOUS at 08:32

## 2018-07-11 RX ADMIN — EPHEDRINE SULFATE 5 MG: 50 INJECTION INTRAMUSCULAR; INTRAVENOUS; SUBCUTANEOUS at 08:39

## 2018-07-11 RX ADMIN — SODIUM CHLORIDE, POTASSIUM CHLORIDE, SODIUM LACTATE AND CALCIUM CHLORIDE: 600; 310; 30; 20 INJECTION, SOLUTION INTRAVENOUS at 09:00

## 2018-07-11 RX ADMIN — EPHEDRINE SULFATE 10 MG: 50 INJECTION INTRAMUSCULAR; INTRAVENOUS; SUBCUTANEOUS at 08:29

## 2018-07-11 RX ADMIN — PHENYLEPHRINE HYDROCHLORIDE 50 MCG: 10 INJECTION INTRAVENOUS at 08:45

## 2018-07-11 RX ADMIN — PHENYLEPHRINE HYDROCHLORIDE 50 MCG: 10 INJECTION INTRAVENOUS at 08:57

## 2018-07-11 RX ADMIN — SODIUM CHLORIDE, POTASSIUM CHLORIDE, SODIUM LACTATE AND CALCIUM CHLORIDE: 600; 310; 30; 20 INJECTION, SOLUTION INTRAVENOUS at 07:02

## 2018-07-11 RX ADMIN — Medication 0.2 MG: at 08:53

## 2018-07-11 RX ADMIN — DEXAMETHASONE SODIUM PHOSPHATE 4 MG: 4 INJECTION, SOLUTION INTRAMUSCULAR; INTRAVENOUS at 08:42

## 2018-07-11 RX ADMIN — MIDAZOLAM 2 MG: 1 INJECTION INTRAMUSCULAR; INTRAVENOUS at 08:14

## 2018-07-11 RX ADMIN — FENTANYL CITRATE 25 MCG: 50 INJECTION, SOLUTION INTRAMUSCULAR; INTRAVENOUS at 10:55

## 2018-07-11 RX ADMIN — EPHEDRINE SULFATE 10 MG: 50 INJECTION INTRAMUSCULAR; INTRAVENOUS; SUBCUTANEOUS at 08:44

## 2018-07-11 RX ADMIN — FENTANYL CITRATE 100 MCG: 50 INJECTION, SOLUTION INTRAMUSCULAR; INTRAVENOUS at 08:22

## 2018-07-11 RX ADMIN — HYDROMORPHONE HYDROCHLORIDE 0.5 MG: 2 INJECTION INTRAMUSCULAR; INTRAVENOUS; SUBCUTANEOUS at 08:44

## 2018-07-11 RX ADMIN — ONDANSETRON 4 MG: 2 INJECTION INTRAMUSCULAR; INTRAVENOUS at 09:48

## 2018-07-11 RX ADMIN — Medication 2 G: at 08:24

## 2018-07-11 RX ADMIN — PROPOFOL 200 MG: 10 INJECTION, EMULSION INTRAVENOUS at 08:22

## 2018-07-11 ASSESSMENT — PULMONARY FUNCTION TESTS
PIF_VALUE: 15
PIF_VALUE: 14
PIF_VALUE: 14
PIF_VALUE: 12
PIF_VALUE: 15
PIF_VALUE: 14
PIF_VALUE: 12
PIF_VALUE: 14
PIF_VALUE: 15
PIF_VALUE: 14
PIF_VALUE: 15
PIF_VALUE: 2
PIF_VALUE: 15
PIF_VALUE: 14
PIF_VALUE: 10
PIF_VALUE: 15
PIF_VALUE: 14
PIF_VALUE: 16
PIF_VALUE: 14
PIF_VALUE: 15
PIF_VALUE: 12
PIF_VALUE: 12
PIF_VALUE: 14
PIF_VALUE: 15
PIF_VALUE: 14
PIF_VALUE: 12
PIF_VALUE: 10
PIF_VALUE: 14
PIF_VALUE: 12
PIF_VALUE: 14
PIF_VALUE: 16
PIF_VALUE: 15
PIF_VALUE: 12
PIF_VALUE: 14
PIF_VALUE: 14
PIF_VALUE: 15
PIF_VALUE: 15
PIF_VALUE: 1
PIF_VALUE: 1
PIF_VALUE: 15
PIF_VALUE: 2
PIF_VALUE: 15
PIF_VALUE: 14
PIF_VALUE: 20
PIF_VALUE: 15
PIF_VALUE: 15
PIF_VALUE: 2
PIF_VALUE: 15
PIF_VALUE: 15
PIF_VALUE: 29
PIF_VALUE: 15
PIF_VALUE: 14
PIF_VALUE: 15
PIF_VALUE: 14
PIF_VALUE: 15
PIF_VALUE: 15
PIF_VALUE: 12
PIF_VALUE: 15
PIF_VALUE: 14
PIF_VALUE: 15
PIF_VALUE: 15
PIF_VALUE: 12
PIF_VALUE: 15
PIF_VALUE: 2
PIF_VALUE: 2
PIF_VALUE: 15
PIF_VALUE: 14
PIF_VALUE: 15
PIF_VALUE: 12
PIF_VALUE: 16
PIF_VALUE: 14
PIF_VALUE: 15
PIF_VALUE: 1
PIF_VALUE: 12
PIF_VALUE: 14
PIF_VALUE: 15
PIF_VALUE: 14
PIF_VALUE: 15
PIF_VALUE: 15
PIF_VALUE: 12
PIF_VALUE: 12
PIF_VALUE: 22
PIF_VALUE: 15
PIF_VALUE: 15
PIF_VALUE: 11
PIF_VALUE: 15
PIF_VALUE: 10
PIF_VALUE: 15
PIF_VALUE: 10
PIF_VALUE: 15
PIF_VALUE: 14
PIF_VALUE: 15
PIF_VALUE: 14
PIF_VALUE: 15
PIF_VALUE: 14
PIF_VALUE: 15
PIF_VALUE: 12
PIF_VALUE: 14
PIF_VALUE: 12
PIF_VALUE: 15
PIF_VALUE: 14
PIF_VALUE: 1
PIF_VALUE: 15
PIF_VALUE: 1
PIF_VALUE: 14

## 2018-07-11 ASSESSMENT — PAIN DESCRIPTION - LOCATION
LOCATION: BREAST
LOCATION: BREAST

## 2018-07-11 ASSESSMENT — PAIN SCALES - GENERAL
PAINLEVEL_OUTOF10: 10
PAINLEVEL_OUTOF10: 7
PAINLEVEL_OUTOF10: 8
PAINLEVEL_OUTOF10: 7
PAINLEVEL_OUTOF10: 10
PAINLEVEL_OUTOF10: 6

## 2018-07-11 ASSESSMENT — PAIN DESCRIPTION - PAIN TYPE: TYPE: SURGICAL PAIN

## 2018-07-11 ASSESSMENT — PAIN DESCRIPTION - ONSET: ONSET: GRADUAL

## 2018-07-11 ASSESSMENT — PAIN - FUNCTIONAL ASSESSMENT: PAIN_FUNCTIONAL_ASSESSMENT: 0-10

## 2018-07-11 ASSESSMENT — PAIN DESCRIPTION - PROGRESSION: CLINICAL_PROGRESSION: GRADUALLY WORSENING

## 2018-07-11 ASSESSMENT — ENCOUNTER SYMPTOMS: SHORTNESS OF BREATH: 1

## 2018-07-11 ASSESSMENT — PAIN DESCRIPTION - ORIENTATION: ORIENTATION: RIGHT;LEFT

## 2018-07-11 NOTE — OP NOTE
[address]                   [address] [facility], [address]                                 OPERATIVE REPORT    PATIENT NAME: Nicolle Craft                    :        1950  MED REC NO:   [id number]                              ROOM:  ACCOUNT NO:   [de-identified]                           ADMIT DATE: 2018  PROVIDER:     Teagan Allan    DATE OF PROCEDURE:  2018    SURGEON:  Teagan Allan MD.    PRIMARY CARE PHYSICIAN:  Gloria Cuadra MD.    PREOPERATIVE DIAGNOSIS:  Bilateral breast cancer with reconstruction using  silicone implants. POSTOPERATIVE DIAGNOSIS:  Bilateral breast cancer with reconstruction using  silicone implants. PROCEDURE PERFORMED:  1. Removal of bilateral silicone breast implants. 2.  Bilateral capsulectomy. 3.  Revision of bilateral reconstructed breasts. 4.  Breast reconstruction using Goldilocks method of superiorly based  fascia cutaneous flaps. ANESTHESIA:  General.    COMPLICATIONS:  None. ESTIMATED BLOOD LOSS:  50 mL. INDICATION AND SIGNIFICANT HISTORY:  The patient is a very pleasant  60-year-old white female seen after breast reconstruction following  mastectomies and silicone implants being placed with AlloDerm. She has  developed significant pain and seems to have the silicone implants removed  and the residual skin tightened. Risks and benefits of procedure were  explained to the patient. Risks such as bleeding, infection, scars, seroma,  residual asymmetries, patient dissatisfaction, hematoma, seroma,  possibility of wound healing problems, and possibility of persistent pain  explained to the patient. She understands that I cannot guarantee that  removing these implants will remove the pain that she has. She had the opportunity to ask variety of questions, all of which were  answered for her. She demonstrates understanding and provides informed  consent.     DESCRIPTION OF OPERATIVE PROCEDURE:  The patient was marked in the  preoperative holding area, then brought to the operating room, placed  supine on the operating table. After adequate sterile prep and drape and  careful attention to aseptic surgical technique maintained at all times,  attention was turned to the left breast where a curvilinear incision along  the inframammary fold was carried out and a combination of blunt and sharp  dissection as well as a Bovie electrocautery were used to dissect out the  inferior border of the silicone breast and capsule, which was carefully  dissected free from the surrounding tissues. The implant and its  surrounding capsule was completely excised. Bovie electrocautery was used  at all times to obtain meticulous hemostasis. Then, a series of tailor  tacking 0 Vicryl sutures were used to estimate the amount of skin needed to  be removed in a Nair pattern excision. Sutures were marked with skin  marking pen and then the extra skin was excised. A series of 3-0 V-Loc  sutures were used to tack down the flap to the chest wall to reduce the  risk of seroma formation, followed by a series of deep fascial sutures of 0  Vicryl to fold up the superiorly based fat flaps that have been preserved  after the skin had been excised and these were tacked underneath in order  to provide for goldilocks flap for a breast reconstruction. A series of deep  fascial sutures of 0 Vicryl were further applied to reapproximate skin  edges with eversion, followed by deep dermal intracuticular INSORB staple  placement. A #7 Vish-Porter drain was brought out through lateral  portion of the incision and secured with a 3-0 nylon suture, followed by a  running 4-0 Monocryl suture with tails tied above the skin throughout. Attention was turned to the opposite side where the exact same procedure  was carried out. Steri-Strips and sterile dressing were applied.   The  patient was awoken, extubated, and transported to recovery room after a  small amount of Marcaine had been placed into the drains. Katy Stefano    D: 07/11/2018 10:54:47       T: 07/11/2018 11:36:17     [email or web address]  Job#: [id number]     Doc#: [id number]    CC:   Carlyn Cartwright MD

## 2018-07-11 NOTE — ANESTHESIA PRE PROCEDURE
07/02/2018    CREATININE 0.67 07/02/2018    GFRAA >60 07/02/2018    LABGLOM >60 07/02/2018    GLUCOSE 94 07/02/2018    PROT 7.5 07/07/2017    CALCIUM 9.6 07/02/2018    BILITOT 0.22 07/07/2017    ALKPHOS 97 07/07/2017    AST 27 07/07/2017    ALT 36 07/07/2017       POC Tests: No results for input(s): POCGLU, POCNA, POCK, POCCL, POCBUN, POCHEMO, POCHCT in the last 72 hours. Coags: No results found for: PROTIME, INR, APTT    HCG (If Applicable): No results found for: PREGTESTUR, PREGSERUM, HCG, HCGQUANT     ABGs: No results found for: PHART, PO2ART, DVC7HUP, DVR7ZQE, BEART, B0WIAHPN     Type & Screen (If Applicable):  No results found for: LABABO, [address]    Anesthesia Evaluation  Patient summary reviewed and Nursing notes reviewed  Airway: Mallampati: I  TM distance: >3 FB   Neck ROM: full  Mouth opening: > = 3 FB Dental: normal exam         Pulmonary:normal exam    (+) shortness of breath: chronic,                             Cardiovascular:    (+) GRAHAM:,       ECG reviewed  Rhythm: regular  Rate: normal  Echocardiogram reviewed               ROS comment: EKG : SR, incomplete RBBB     Neuro/Psych:   (+) depression/anxiety             GI/Hepatic/Renal:             Endo/Other:    (+) malignancy/cancer. ROS comment: Breast CA Abdominal:           Vascular:                                        Anesthesia Plan      general     ASA 2       Induction: intravenous. MIPS: Postoperative opioids intended and Prophylactic antiemetics administered. Anesthetic plan and risks discussed with patient. Plan discussed with CRNA.                   Darryl Louis MD   7/11/2018

## 2018-07-11 NOTE — H&P
HISTORY and [facility]       NAME:  Nasir Trinidad  MRN: [id number]   YOB: 1950   Date: 7/11/2018   Age: 78 y.o. Gender: female     H&P Update Note    H&P from 07/02/2018 reviewed and updated. Patient examined. INTERVAL HISTORY:     Patient is feeling well today, denies any fever/chills, chest pain, shortness of breath. No interval changes. GENERAL PHYSICAL EXAM:     Vitals: /72   Pulse 54   Temp 97.5 °F (36.4 °C) (Oral)   Resp 16   Ht 5' 3\" (1.6 m)   Wt 170 lb (77.1 kg)   LMP 10/28/2000   SpO2 97%   BMI 30.11 kg/m²  Body mass index is 30.11 kg/m². GENERAL APPEARANCE:  Patient is alert and oriented. Does not appear to be distress or pain at this time. Patient resting comfortably in bed.          SKIN:  Normal temperature, turgor and texture. No cyanosis or jaundice.        HEART:  Regular rate and rhythm.      LUNGS:  Equal on expansion, normal breath sounds. No adventitious sounds.       ABDOMEN:  Soft on palpation. No localized tenderness, guarding or rigidity. EXTREMITIES:  Symmetrical with no pretibial/pedal edema. No warmth, tenderness, erythema noted in lower legs bilaterally. No interval changes. I concur with the findings. Inocencio Villarreal PA-C on 7/11/2018 at 6:38 AM          Cheryl Torres PA-C Physician Assistant Signed Internal Medicine  H&P Date of Service: 7/2/2018  9:18 AM   Related encounter: Pre-Admission Testing Visit from 7/2/2018 in [facility]  and [facility]         NAME:  Nasir Trinidad  MRN: [id number]   YOB: 1950   Date: 7/2/2018   Age: 78 y.o. Gender: female      COMPLAINT AND PRESENT HISTORY:      Nasir Trinidad is 78 y.o.,  female,undergoing preadmission testing for Breast implant Removal.  Pt had the Implants for the last 2 years after cancer breast with Marco Mastectomy. Pt C/O of local pain, tightness.   No recent falls

## 2018-07-16 ENCOUNTER — HOSPITAL ENCOUNTER (OUTPATIENT)
Dept: WOMENS IMAGING | Age: 68
Discharge: HOME | End: 2018-07-18
Payer: MEDICARE

## 2018-07-16 DIAGNOSIS — C50.212 MALIGNANT NEOPLASM OF UPPER-INNER QUADRANT OF LEFT BREAST IN FEMALE, ESTROGEN RECEPTOR POSITIVE (HCC): ICD-10-CM

## 2018-07-16 DIAGNOSIS — M85.89 OSTEOPENIA OF MULTIPLE SITES: ICD-10-CM

## 2018-07-16 DIAGNOSIS — Z17.0 MALIGNANT NEOPLASM OF UPPER-INNER QUADRANT OF LEFT BREAST IN FEMALE, ESTROGEN RECEPTOR POSITIVE (HCC): ICD-10-CM

## 2018-07-16 PROCEDURE — 77080 DXA BONE DENSITY AXIAL: CPT

## 2018-08-02 ENCOUNTER — TELEPHONE (OUTPATIENT)
Dept: ONCOLOGY | Age: 68
End: 2018-08-02

## 2018-08-02 ENCOUNTER — OFFICE VISIT (OUTPATIENT)
Dept: ONCOLOGY | Age: 68
End: 2018-08-02
Payer: MEDICARE

## 2018-08-02 VITALS
HEART RATE: 54 BPM | BODY MASS INDEX: 31.41 KG/M2 | TEMPERATURE: 98.3 F | SYSTOLIC BLOOD PRESSURE: 135 MMHG | DIASTOLIC BLOOD PRESSURE: 69 MMHG | WEIGHT: 177.31 LBS

## 2018-08-02 DIAGNOSIS — C50.212 MALIGNANT NEOPLASM OF UPPER-INNER QUADRANT OF LEFT BREAST IN FEMALE, ESTROGEN RECEPTOR POSITIVE (HCC): ICD-10-CM

## 2018-08-02 DIAGNOSIS — Z17.0 MALIGNANT NEOPLASM OF UPPER-INNER QUADRANT OF LEFT BREAST IN FEMALE, ESTROGEN RECEPTOR POSITIVE (HCC): ICD-10-CM

## 2018-08-02 DIAGNOSIS — M85.89 OSTEOPENIA OF MULTIPLE SITES: Primary | ICD-10-CM

## 2018-08-02 PROCEDURE — 99211 OFF/OP EST MAY X REQ PHY/QHP: CPT | Performed by: INTERNAL MEDICINE

## 2018-08-02 PROCEDURE — G8417 CALC BMI ABV UP PARAM F/U: HCPCS | Performed by: INTERNAL MEDICINE

## 2018-08-02 PROCEDURE — 99214 OFFICE O/P EST MOD 30 MIN: CPT | Performed by: INTERNAL MEDICINE

## 2018-08-02 PROCEDURE — 3017F COLORECTAL CA SCREEN DOC REV: CPT | Performed by: INTERNAL MEDICINE

## 2018-08-02 PROCEDURE — G8399 PT W/DXA RESULTS DOCUMENT: HCPCS | Performed by: INTERNAL MEDICINE

## 2018-08-02 PROCEDURE — 1036F TOBACCO NON-USER: CPT | Performed by: INTERNAL MEDICINE

## 2018-08-02 PROCEDURE — G8427 DOCREV CUR MEDS BY ELIG CLIN: HCPCS | Performed by: INTERNAL MEDICINE

## 2018-08-02 PROCEDURE — 4040F PNEUMOC VAC/ADMIN/RCVD: CPT | Performed by: INTERNAL MEDICINE

## 2018-08-02 PROCEDURE — 1123F ACP DISCUSS/DSCN MKR DOCD: CPT | Performed by: INTERNAL MEDICINE

## 2018-08-02 PROCEDURE — 1090F PRES/ABSN URINE INCON ASSESS: CPT | Performed by: INTERNAL MEDICINE

## 2018-08-02 PROCEDURE — 1101F PT FALLS ASSESS-DOCD LE1/YR: CPT | Performed by: INTERNAL MEDICINE

## 2018-08-02 RX ORDER — PROMETHAZINE HYDROCHLORIDE 25 MG/1
25 TABLET ORAL DAILY PRN
Refills: 0 | COMMUNITY
Start: 2018-06-26 | End: 2019-02-14 | Stop reason: ALTCHOICE

## 2018-08-02 NOTE — LETTER
Antelmo Rosas MD    8/2/2018     [address] [facility] [id number]    Dear Juan Ramirez : Thank you for referring Herbert Lopez, 1950, to me for evaluation. Below are the relevant portions of my assessment and plan of care. DIAGNOSIS:   1. Stage I (T1b,N0,M0) ER/IN+ , HER2 -ve , invasive breast cancer in upper outer quadrant of the left breast.  2. 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 Bilateral mastectomy done 12/16/15  2. Trial of arimidex. Stopped due to nausea and vomiting. Resumed later without major problems. 3. S/p removal of breast implants. Due to pain. BRIEF CASE HISTORY:   Ms. Herbert Lopez is a very pleasant 78 y.o. female who underwent screening mammogram on 9/21/15 , it showed focal asymmetry in the upper outer quadrant of the left breast that was suspicious. Diagnostic mammogram was done on 9/29/15, showed highly suspicious mass at 2:00 position in, upper outer quadrant of the left breast.  There was another area of architectural distortion that was also concerning. Stereotactic biopsy was done on 10/9/15 and that showed invasive grade 2 ductal carcinoma of the breast, the tumor was ER/IN positive, unfortunately, there was not enough tissue to test for HER-2  Patient was sent to us for further discussion, she was very interested in genetic testing based on family history, while BRCA was negative. But showing VUS in MUTYH. Despite negative testing, the patient was still interested in bilateral mastectomy. Surgery was done on 12/16/15. Pathology showed 8mm invasive ductal carcinoma in the upper outer quadrant of the left breast , grade 2. The margins were clear. After surgery , we recommended adjuvant aromatase inhibitor with arimidex. Bilateral implants April 4 2016, nipple reconstruction June 6 2016. She continued to complain of pain in the implants, she ultimately had them removed in 2018 .

## 2018-08-02 NOTE — PROGRESS NOTES
DIAGNOSIS:   1. Stage I (T1b,N0,M0) ER/WA+ , HER2 -ve , invasive breast cancer in upper outer quadrant of the left breast.  2. 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 Bilateral mastectomy done 12/16/15  2. Trial of arimidex. Stopped due to nausea and vomiting. Resumed later without major problems. 3. S/p removal of breast implants. Due to pain. BRIEF CASE HISTORY:   Ms. Ree Pinto is a very pleasant 78 y.o. female who underwent screening mammogram on 9/21/15 , it showed focal asymmetry in the upper outer quadrant of the left breast that was suspicious. Diagnostic mammogram was done on 9/29/15, showed highly suspicious mass at 2:00 position in, upper outer quadrant of the left breast.  There was another area of architectural distortion that was also concerning. Stereotactic biopsy was done on 10/9/15 and that showed invasive grade 2 ductal carcinoma of the breast, the tumor was ER/WA positive, unfortunately, there was not enough tissue to test for HER-2  Patient was sent to us for further discussion, she was very interested in genetic testing based on family history, while BRCA was negative. But showing VUS in MUTYH. Despite negative testing, the patient was still interested in bilateral mastectomy. Surgery was done on 12/16/15. Pathology showed 8mm invasive ductal carcinoma in the upper outer quadrant of the left breast , grade 2. The margins were clear. After surgery , we recommended adjuvant aromatase inhibitor with arimidex. Bilateral implants April 4 2016, nipple reconstruction June 6 2016. She continued to complain of pain in the implants, she ultimately had them removed in 2018 . INTERIM HISTORY: Patient is in today for follow up. She continues to have hot flashes unchanged. She denies fever, chills, new pain. She had implants removed and discomfort has resolved - she has no plans for further reconstruction. She continues to take Arimidex with no negative effects. GYNECOLOGICAL HISTORY  Menarche at age 7. Menaupause at age 40. +1. Age at first full term pregnancy 25. No use of HRT. PAST MEDICAL HISTORY: has a past medical history of Abnormal EKG; Chest pain; Diverticulosis; History of bilateral breast cancer; Short of breath on exertion; Urinary frequency; and Wears glasses. PAST SURGICAL HISTORY: has past surgical history that includes Splenectomy (N/A); Appendectomy; Colon surgery; Hysterectomy; MONA/BSO    CURRENT MEDICATIONS:  has a current medication list which includes the following prescription(s): aspirin, coenzyme q10, anastrozole, nato root, NONFORMULARY, multiple vitamins-minerals, fexofenadine, ibuprofen, calcium & magnesium carbonates, NONFORMULARY, multiple vitamins-minerals, psyllium, NONFORMULARY, omega-3 fatty acids, and promethazine. ALLERGIES:  is allergic to ciprofloxacin hcl; coffee bean extract [coffea arabica]; darvon [propoxyphene]; levaquin [levofloxacin in d5w]; other; and penicillins. FAMILY HISTORY: There is an history of breast cancer in her sister in her 46s, maternal aunt in her 76s and 2 cousins in their 46s. SOCIAL HISTORY:  reports that she has never smoked. She has never used smokeless tobacco. She reports that she does not drink alcohol or use drugs. REVIEW OF SYSTEMS:     General: no fever or night sweats, Weight is stable, some weight distribution. Grade 1 fatigue. Mild hot flashes  ENT: No double or blurred vision, no tinnitus or hearing problem, no dysphagia or sore throat. Respiratory: No chest pain, no shortness of breath, no cough or hemoptysis. Cardiovascular: Denies chest pain, PND or orthopnea. No L E swelling or palpitations. Gastrointestinal: No nausea or vomiting, abdominal pain, diarrhea or constipation. Genitourinary: Denies dysuria, hematuria, frequency, urgency or incontinence. Neurological: Denies headaches, decreased LOC, no sensory or motor focal deficits.   Musculoskeletal: No joint swelling. Muscle and joint pain - intermittent. Leg cramping and burning sensation in feet. Skin: There are no rashes or bleeding. Psychiatric:  No anxiety, no depression. Insomnia   Endocrine: No diabetes or thyroid disease. Hematologic: No bleeding, no adenopathy. PHYSICAL EXAM:  The patient is not in acute distress. Vital signs: Blood pressure 135/69, pulse 54, temperature 98.3 °F (36.8 °C), weight 177 lb 5 oz (80.4 kg), last menstrual period 10/28/2000, not currently breastfeeding. HEENT:  Eyes are normal. Ears, nose and throat are normal.  Neck: Supple. No lymph node enlargement. No thyroid enlargement. Trachea is centrally located. Chest:  Clear to auscultation. No wheezes or crepitations. Breast: S/P bilateral mastectomy, well healed, bilateral reconstruction implants, no evidence of recurrence, no lymphadenopathy, no nodularity, decreased sensation in both breasts, no lymphedema. Left: lower edge of reconstruction small skin scar, no evidence of mass Heart: Regular sinus rhythm. Abdomen: Soft, nontender. No hepatosplenomegaly. No masses. Midline incision (splenectomy and partial colectomy)   Extremities:  No edema. Lymph Nodes:  No cervical, axillary or inguinal lymph node enlargement or adenopathy. Neurologic:  Conscious and oriented. No focal neurological deficits. Psychosocial: No depression, anxiety or stress. Skin: No rashes, bruises or ecchymoses.       REVIEW OF RADIOLOGICAL RESULTS:      DEXA:         T-SCORE:  05/31/2016 07/19/2018   LUMBAR:        -0.6  -1.0  LEFT HIP:         -0.9  -1.0   FEMORAL NECK: -1.1  -1.4    REVIEW OF LABORATORY DATA:  Lab Results   Component Value Date    WBC 5.6 07/02/2018    HGB 14.1 07/02/2018    HCT 42.5 07/02/2018    MCV 90.8 07/02/2018     07/02/2018       Chemistry        Component Value Date/Time     07/02/2018 0940    K 4.1 07/02/2018 0940     07/02/2018 0940    CO2 25 07/02/2018 0940    BUN 18 07/02/2018 0940    CREATININE

## 2018-10-08 DIAGNOSIS — C50.212 MALIGNANT NEOPLASM OF UPPER-INNER QUADRANT OF LEFT FEMALE BREAST (HCC): ICD-10-CM

## 2018-10-10 RX ORDER — ANASTROZOLE 1 MG/1
TABLET ORAL
Qty: 90 TABLET | Refills: 3 | Status: SHIPPED | OUTPATIENT
Start: 2018-10-10 | End: 2019-10-03 | Stop reason: SDUPTHER

## 2018-11-07 ENCOUNTER — TELEPHONE (OUTPATIENT)
Dept: ONCOLOGY | Age: 68
End: 2018-11-07

## 2019-01-24 ENCOUNTER — HOSPITAL ENCOUNTER (OUTPATIENT)
Age: 69
Setting detail: SPECIMEN
Discharge: HOME | End: 2019-01-24
Payer: MEDICARE

## 2019-01-24 PROCEDURE — 88305 TISSUE EXAM BY PATHOLOGIST: CPT

## 2019-01-24 PROCEDURE — 88312 SPECIAL STAINS GROUP 1: CPT

## 2019-01-30 LAB — SURGICAL PATHOLOGY REPORT: NORMAL

## 2019-02-14 ENCOUNTER — OFFICE VISIT (OUTPATIENT)
Dept: ONCOLOGY | Age: 69
End: 2019-02-14
Payer: MEDICARE

## 2019-02-14 VITALS
WEIGHT: 172.6 LBS | HEART RATE: 50 BPM | TEMPERATURE: 98 F | DIASTOLIC BLOOD PRESSURE: 70 MMHG | SYSTOLIC BLOOD PRESSURE: 128 MMHG | BODY MASS INDEX: 30.57 KG/M2

## 2019-02-14 DIAGNOSIS — Z17.0 MALIGNANT NEOPLASM OF UPPER-INNER QUADRANT OF LEFT BREAST IN FEMALE, ESTROGEN RECEPTOR POSITIVE (HCC): ICD-10-CM

## 2019-02-14 DIAGNOSIS — C50.212 MALIGNANT NEOPLASM OF UPPER-INNER QUADRANT OF LEFT BREAST IN FEMALE, ESTROGEN RECEPTOR POSITIVE (HCC): ICD-10-CM

## 2019-02-14 PROCEDURE — 99214 OFFICE O/P EST MOD 30 MIN: CPT | Performed by: INTERNAL MEDICINE

## 2019-02-14 PROCEDURE — 1101F PT FALLS ASSESS-DOCD LE1/YR: CPT | Performed by: INTERNAL MEDICINE

## 2019-02-14 PROCEDURE — G8427 DOCREV CUR MEDS BY ELIG CLIN: HCPCS | Performed by: INTERNAL MEDICINE

## 2019-02-14 PROCEDURE — G8417 CALC BMI ABV UP PARAM F/U: HCPCS | Performed by: INTERNAL MEDICINE

## 2019-02-14 PROCEDURE — 1123F ACP DISCUSS/DSCN MKR DOCD: CPT | Performed by: INTERNAL MEDICINE

## 2019-02-14 PROCEDURE — 99211 OFF/OP EST MAY X REQ PHY/QHP: CPT | Performed by: INTERNAL MEDICINE

## 2019-02-14 PROCEDURE — 3017F COLORECTAL CA SCREEN DOC REV: CPT | Performed by: INTERNAL MEDICINE

## 2019-02-14 PROCEDURE — G8399 PT W/DXA RESULTS DOCUMENT: HCPCS | Performed by: INTERNAL MEDICINE

## 2019-02-14 PROCEDURE — G8484 FLU IMMUNIZE NO ADMIN: HCPCS | Performed by: INTERNAL MEDICINE

## 2019-02-14 PROCEDURE — 4040F PNEUMOC VAC/ADMIN/RCVD: CPT | Performed by: INTERNAL MEDICINE

## 2019-02-14 PROCEDURE — 1036F TOBACCO NON-USER: CPT | Performed by: INTERNAL MEDICINE

## 2019-02-14 PROCEDURE — 1090F PRES/ABSN URINE INCON ASSESS: CPT | Performed by: INTERNAL MEDICINE

## 2019-03-08 ENCOUNTER — HOSPITAL ENCOUNTER (OUTPATIENT)
Age: 69
Discharge: HOME | End: 2019-03-08
Payer: MEDICARE

## 2019-03-08 LAB
ABSOLUTE EOS #: 0.19 K/UL (ref 0–0.44)
ABSOLUTE IMMATURE GRANULOCYTE: <0.03 K/UL (ref 0–0.3)
ABSOLUTE LYMPH #: 1.81 K/UL (ref 1.1–3.7)
ABSOLUTE MONO #: 0.4 K/UL (ref 0.1–1.2)
ALBUMIN SERPL-MCNC: 4 G/DL (ref 3.5–5.2)
ALBUMIN/GLOBULIN RATIO: 1.1 (ref 1–2.5)
ALP BLD-CCNC: 102 U/L (ref 35–104)
ALT SERPL-CCNC: 43 U/L (ref 5–33)
ANION GAP SERPL CALCULATED.3IONS-SCNC: 10 MMOL/L (ref 9–17)
AST SERPL-CCNC: 40 U/L
BASOPHILS # BLD: 1 % (ref 0–2)
BASOPHILS ABSOLUTE: 0.07 K/UL (ref 0–0.2)
BILIRUB SERPL-MCNC: 0.43 MG/DL (ref 0.3–1.2)
BUN BLDV-MCNC: 17 MG/DL (ref 8–23)
BUN/CREAT BLD: ABNORMAL (ref 9–20)
CALCIUM SERPL-MCNC: 9.8 MG/DL (ref 8.6–10.4)
CHLORIDE BLD-SCNC: 107 MMOL/L (ref 98–107)
CHOLESTEROL, FASTING: 197 MG/DL
CHOLESTEROL/HDL RATIO: 2.6
CO2: 23 MMOL/L (ref 20–31)
CREAT SERPL-MCNC: 0.58 MG/DL (ref 0.5–0.9)
DIFFERENTIAL TYPE: ABNORMAL
EOSINOPHILS RELATIVE PERCENT: 3 % (ref 1–4)
GFR AFRICAN AMERICAN: >60 ML/MIN
GFR NON-AFRICAN AMERICAN: >60 ML/MIN
GFR SERPL CREATININE-BSD FRML MDRD: ABNORMAL ML/MIN/{1.73_M2}
GFR SERPL CREATININE-BSD FRML MDRD: ABNORMAL ML/MIN/{1.73_M2}
GLUCOSE BLD-MCNC: 92 MG/DL (ref 70–99)
HCT VFR BLD CALC: 56 % (ref 36.3–47.1)
HDLC SERPL-MCNC: 77 MG/DL
HEMOGLOBIN: 16.8 G/DL (ref 11.9–15.1)
IMMATURE GRANULOCYTES: 0 %
LDL CHOLESTEROL: 110 MG/DL (ref 0–130)
LYMPHOCYTES # BLD: 30 % (ref 24–43)
MCH RBC QN AUTO: 30.2 PG (ref 25.2–33.5)
MCHC RBC AUTO-ENTMCNC: 30 G/DL (ref 28.4–34.8)
MCV RBC AUTO: 100.5 FL (ref 82.6–102.9)
MONOCYTES # BLD: 7 % (ref 3–12)
NRBC AUTOMATED: 0 PER 100 WBC
PDW BLD-RTO: 12.7 % (ref 11.8–14.4)
PLATELET # BLD: 332 K/UL (ref 138–453)
PLATELET ESTIMATE: ABNORMAL
PMV BLD AUTO: 9.6 FL (ref 8.1–13.5)
POTASSIUM SERPL-SCNC: 5.2 MMOL/L (ref 3.7–5.3)
RBC # BLD: 5.57 M/UL (ref 3.95–5.11)
RBC # BLD: ABNORMAL 10*6/UL
SEG NEUTROPHILS: 59 % (ref 36–65)
SEGMENTED NEUTROPHILS ABSOLUTE COUNT: 3.59 K/UL (ref 1.5–8.1)
SODIUM BLD-SCNC: 140 MMOL/L (ref 135–144)
TOTAL PROTEIN: 7.5 G/DL (ref 6.4–8.3)
TRIGLYCERIDE, FASTING: 49 MG/DL
TSH SERPL DL<=0.05 MIU/L-ACNC: 1.43 MIU/L (ref 0.3–5)
VLDLC SERPL CALC-MCNC: NORMAL MG/DL (ref 1–30)
WBC # BLD: 6.1 K/UL (ref 3.5–11.3)
WBC # BLD: ABNORMAL 10*3/UL

## 2019-03-08 PROCEDURE — 36415 COLL VENOUS BLD VENIPUNCTURE: CPT

## 2019-03-08 PROCEDURE — 80053 COMPREHEN METABOLIC PANEL: CPT

## 2019-03-08 PROCEDURE — 84443 ASSAY THYROID STIM HORMONE: CPT

## 2019-03-08 PROCEDURE — 80061 LIPID PANEL: CPT

## 2019-03-08 PROCEDURE — 85025 COMPLETE CBC W/AUTO DIFF WBC: CPT

## 2019-07-26 ENCOUNTER — HOSPITAL ENCOUNTER (OUTPATIENT)
Age: 69
Discharge: HOME | End: 2019-07-26
Payer: MEDICARE

## 2019-07-26 DIAGNOSIS — Z17.0 MALIGNANT NEOPLASM OF UPPER-INNER QUADRANT OF LEFT BREAST IN FEMALE, ESTROGEN RECEPTOR POSITIVE (HCC): ICD-10-CM

## 2019-07-26 DIAGNOSIS — C50.212 MALIGNANT NEOPLASM OF UPPER-INNER QUADRANT OF LEFT BREAST IN FEMALE, ESTROGEN RECEPTOR POSITIVE (HCC): ICD-10-CM

## 2019-07-26 LAB
ABSOLUTE EOS #: 0.1 K/UL (ref 0–0.44)
ABSOLUTE IMMATURE GRANULOCYTE: 0.03 K/UL (ref 0–0.3)
ABSOLUTE LYMPH #: 1.72 K/UL (ref 1.1–3.7)
ABSOLUTE MONO #: 0.71 K/UL (ref 0.1–1.2)
ALBUMIN SERPL-MCNC: 4.3 G/DL (ref 3.5–5.2)
ALBUMIN/GLOBULIN RATIO: 1.3 (ref 1–2.5)
ALP BLD-CCNC: 72 U/L (ref 35–104)
ALT SERPL-CCNC: 30 U/L (ref 5–33)
ANION GAP SERPL CALCULATED.3IONS-SCNC: 14 MMOL/L (ref 9–17)
AST SERPL-CCNC: 28 U/L
BASOPHILS # BLD: 1 % (ref 0–2)
BASOPHILS ABSOLUTE: 0.06 K/UL (ref 0–0.2)
BILIRUB SERPL-MCNC: 0.4 MG/DL (ref 0.3–1.2)
BUN BLDV-MCNC: 17 MG/DL (ref 8–23)
BUN/CREAT BLD: ABNORMAL (ref 9–20)
CALCIUM SERPL-MCNC: 9.8 MG/DL (ref 8.6–10.4)
CHLORIDE BLD-SCNC: 104 MMOL/L (ref 98–107)
CO2: 23 MMOL/L (ref 20–31)
CREAT SERPL-MCNC: 0.71 MG/DL (ref 0.5–0.9)
DIFFERENTIAL TYPE: ABNORMAL
EOSINOPHILS RELATIVE PERCENT: 1 % (ref 1–4)
GFR AFRICAN AMERICAN: >60 ML/MIN
GFR NON-AFRICAN AMERICAN: >60 ML/MIN
GFR SERPL CREATININE-BSD FRML MDRD: ABNORMAL ML/MIN/{1.73_M2}
GFR SERPL CREATININE-BSD FRML MDRD: ABNORMAL ML/MIN/{1.73_M2}
GLUCOSE BLD-MCNC: 101 MG/DL (ref 70–99)
HCT VFR BLD CALC: 41.9 % (ref 36.3–47.1)
HEMOGLOBIN: 13.6 G/DL (ref 11.9–15.1)
IMMATURE GRANULOCYTES: 0 %
LYMPHOCYTES # BLD: 20 % (ref 24–43)
MCH RBC QN AUTO: 29.9 PG (ref 25.2–33.5)
MCHC RBC AUTO-ENTMCNC: 32.5 G/DL (ref 28.4–34.8)
MCV RBC AUTO: 92.1 FL (ref 82.6–102.9)
MONOCYTES # BLD: 8 % (ref 3–12)
NRBC AUTOMATED: 0 PER 100 WBC
PDW BLD-RTO: 12.5 % (ref 11.8–14.4)
PLATELET # BLD: 448 K/UL (ref 138–453)
PLATELET ESTIMATE: ABNORMAL
PMV BLD AUTO: 10.3 FL (ref 8.1–13.5)
POTASSIUM SERPL-SCNC: 4 MMOL/L (ref 3.7–5.3)
RBC # BLD: 4.55 M/UL (ref 3.95–5.11)
RBC # BLD: ABNORMAL 10*6/UL
SEG NEUTROPHILS: 70 % (ref 36–65)
SEGMENTED NEUTROPHILS ABSOLUTE COUNT: 5.86 K/UL (ref 1.5–8.1)
SODIUM BLD-SCNC: 141 MMOL/L (ref 135–144)
TOTAL PROTEIN: 7.7 G/DL (ref 6.4–8.3)
WBC # BLD: 8.5 K/UL (ref 3.5–11.3)
WBC # BLD: ABNORMAL 10*3/UL

## 2019-07-26 PROCEDURE — 36415 COLL VENOUS BLD VENIPUNCTURE: CPT

## 2019-07-26 PROCEDURE — 85025 COMPLETE CBC W/AUTO DIFF WBC: CPT

## 2019-07-26 PROCEDURE — 80053 COMPREHEN METABOLIC PANEL: CPT

## 2019-08-07 ENCOUNTER — TELEPHONE (OUTPATIENT)
Dept: ONCOLOGY | Age: 69
End: 2019-08-07

## 2019-08-07 ENCOUNTER — OFFICE VISIT (OUTPATIENT)
Dept: ONCOLOGY | Age: 69
End: 2019-08-07
Payer: MEDICARE

## 2019-08-07 VITALS
HEIGHT: 63 IN | TEMPERATURE: 97.8 F | SYSTOLIC BLOOD PRESSURE: 129 MMHG | HEART RATE: 60 BPM | DIASTOLIC BLOOD PRESSURE: 76 MMHG | BODY MASS INDEX: 29.95 KG/M2 | WEIGHT: 169 LBS

## 2019-08-07 DIAGNOSIS — Z17.0 MALIGNANT NEOPLASM OF UPPER-INNER QUADRANT OF LEFT BREAST IN FEMALE, ESTROGEN RECEPTOR POSITIVE (HCC): Primary | ICD-10-CM

## 2019-08-07 DIAGNOSIS — C50.212 MALIGNANT NEOPLASM OF UPPER-INNER QUADRANT OF LEFT BREAST IN FEMALE, ESTROGEN RECEPTOR POSITIVE (HCC): Primary | ICD-10-CM

## 2019-08-07 PROCEDURE — 99213 OFFICE O/P EST LOW 20 MIN: CPT | Performed by: INTERNAL MEDICINE

## 2019-08-07 PROCEDURE — G8427 DOCREV CUR MEDS BY ELIG CLIN: HCPCS | Performed by: INTERNAL MEDICINE

## 2019-08-07 PROCEDURE — 1090F PRES/ABSN URINE INCON ASSESS: CPT | Performed by: INTERNAL MEDICINE

## 2019-08-07 PROCEDURE — 3017F COLORECTAL CA SCREEN DOC REV: CPT | Performed by: INTERNAL MEDICINE

## 2019-08-07 PROCEDURE — 1036F TOBACCO NON-USER: CPT | Performed by: INTERNAL MEDICINE

## 2019-08-07 PROCEDURE — G8417 CALC BMI ABV UP PARAM F/U: HCPCS | Performed by: INTERNAL MEDICINE

## 2019-08-07 PROCEDURE — G8399 PT W/DXA RESULTS DOCUMENT: HCPCS | Performed by: INTERNAL MEDICINE

## 2019-08-07 PROCEDURE — 1123F ACP DISCUSS/DSCN MKR DOCD: CPT | Performed by: INTERNAL MEDICINE

## 2019-08-07 PROCEDURE — 4040F PNEUMOC VAC/ADMIN/RCVD: CPT | Performed by: INTERNAL MEDICINE

## 2019-08-07 NOTE — PROGRESS NOTES
4.0 07/26/2019 1012     07/26/2019 1012    CO2 23 07/26/2019 1012    BUN 17 07/26/2019 1012    CREATININE 0.71 07/26/2019 1012        Component Value Date/Time    CALCIUM 9.8 07/26/2019 1012    ALKPHOS 72 07/26/2019 1012    AST 28 07/26/2019 1012    ALT 30 07/26/2019 1012    BILITOT 0.40 07/26/2019 1012             IMPRESSION:   1. Stage I (T1b,sN0,M0) ER and [place] positive breast cancer. HER-2/jose d is negative on the final specimen  2. 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 S/P bilateral mastectomy. 4. Side effects of Arimidex as discussed. Seems to be well tolerated   5. Osteopenia. On calcium and vitamin D   6. S/P removal of both breast implants       PLAN:   1. We reviewed her current status. 2. Her lab work was reviewed and discussed, all in range. 3. Breast exam was negative and she remains in remission. 4. I completed toxicity check. 5. Continue with Arimidex unchanged and we reviewed plan for 5 years of therapy. 6. Return in 6 months.

## 2019-10-03 DIAGNOSIS — C50.212 MALIGNANT NEOPLASM OF UPPER-INNER QUADRANT OF LEFT FEMALE BREAST (HCC): ICD-10-CM

## 2019-10-04 RX ORDER — ANASTROZOLE 1 MG/1
TABLET ORAL
Qty: 90 TABLET | Refills: 0 | Status: SHIPPED | OUTPATIENT
Start: 2019-10-04 | End: 2019-12-30

## 2019-12-28 DIAGNOSIS — C50.212 MALIGNANT NEOPLASM OF UPPER-INNER QUADRANT OF LEFT FEMALE BREAST (HCC): ICD-10-CM

## 2019-12-30 RX ORDER — ANASTROZOLE 1 MG/1
TABLET ORAL
Qty: 90 TABLET | Refills: 0 | Status: SHIPPED | OUTPATIENT
Start: 2019-12-30 | End: 2020-03-25

## 2020-01-23 ENCOUNTER — TELEPHONE (OUTPATIENT)
Dept: ONCOLOGY | Age: 70
End: 2020-01-23

## 2020-01-23 NOTE — TELEPHONE ENCOUNTER
Dr Abhishek Angela completed DME form for [facility] for patient and signed. Form faxed to [phone] and confirmation fax and form placed in pile to be scanned.  Garret Bey

## 2020-02-11 NOTE — TELEPHONE ENCOUNTER
Call received from Efren Benites @ [facility] stating they never received fax for DME physician order form sent on 1/23/20. Writer refaxed form to [phone] and confirmation fax received.  Fifi Lewis

## 2020-03-25 RX ORDER — ANASTROZOLE 1 MG/1
TABLET ORAL
Qty: 90 TABLET | Refills: 0 | Status: SHIPPED | OUTPATIENT
Start: 2020-03-25 | End: 2020-06-17

## 2020-06-10 ENCOUNTER — HOSPITAL ENCOUNTER (OUTPATIENT)
Age: 70
Setting detail: SPECIMEN
Discharge: HOME | End: 2020-06-10
Payer: MEDICARE

## 2020-06-10 LAB
ESTIMATED AVERAGE GLUCOSE: 114 MG/DL
HBA1C MFR BLD: 5.6 % (ref 4–6)
HEPATITIS C ANTIBODY: NONREACTIVE

## 2020-06-17 RX ORDER — ANASTROZOLE 1 MG/1
TABLET ORAL
Qty: 90 TABLET | Refills: 0 | Status: SHIPPED | OUTPATIENT
Start: 2020-06-17 | End: 2020-09-10

## 2020-09-24 ENCOUNTER — TELEPHONE (OUTPATIENT)
Dept: ONCOLOGY | Age: 70
End: 2020-09-24

## 2020-09-24 ENCOUNTER — OFFICE VISIT (OUTPATIENT)
Dept: ONCOLOGY | Age: 70
End: 2020-09-24
Payer: MEDICARE

## 2020-09-24 VITALS
WEIGHT: 176.6 LBS | HEART RATE: 76 BPM | BODY MASS INDEX: 31.29 KG/M2 | HEIGHT: 63 IN | SYSTOLIC BLOOD PRESSURE: 138 MMHG | DIASTOLIC BLOOD PRESSURE: 80 MMHG | RESPIRATION RATE: 18 BRPM | TEMPERATURE: 97 F

## 2020-09-24 PROCEDURE — 1036F TOBACCO NON-USER: CPT | Performed by: INTERNAL MEDICINE

## 2020-09-24 PROCEDURE — 1090F PRES/ABSN URINE INCON ASSESS: CPT | Performed by: INTERNAL MEDICINE

## 2020-09-24 PROCEDURE — G8417 CALC BMI ABV UP PARAM F/U: HCPCS | Performed by: INTERNAL MEDICINE

## 2020-09-24 PROCEDURE — 1123F ACP DISCUSS/DSCN MKR DOCD: CPT | Performed by: INTERNAL MEDICINE

## 2020-09-24 PROCEDURE — 99211 OFF/OP EST MAY X REQ PHY/QHP: CPT

## 2020-09-24 PROCEDURE — 3017F COLORECTAL CA SCREEN DOC REV: CPT | Performed by: INTERNAL MEDICINE

## 2020-09-24 PROCEDURE — G8427 DOCREV CUR MEDS BY ELIG CLIN: HCPCS | Performed by: INTERNAL MEDICINE

## 2020-09-24 PROCEDURE — 4040F PNEUMOC VAC/ADMIN/RCVD: CPT | Performed by: INTERNAL MEDICINE

## 2020-09-24 PROCEDURE — 99214 OFFICE O/P EST MOD 30 MIN: CPT | Performed by: INTERNAL MEDICINE

## 2020-09-24 PROCEDURE — G8399 PT W/DXA RESULTS DOCUMENT: HCPCS | Performed by: INTERNAL MEDICINE

## 2020-09-24 RX ORDER — ANASTROZOLE 1 MG/1
1 TABLET ORAL DAILY
Qty: 90 TABLET | Refills: 0 | Status: SHIPPED | OUTPATIENT
Start: 2020-09-24 | End: 2021-12-14

## 2020-09-24 NOTE — PROGRESS NOTES
Received refill request for Hydrochlorothiazide   Last office visit: 2/13/20  Last filled 4/9/19  Last Lab 2/5/20    According to the [facility] refill policy, Brynn Martinez's Prescription refill request Approved for 90 day supply    GYNECOLOGICAL HISTORY  Menarche at age 7. Menaupause at age 40. +1. Age at first full term pregnancy 25. No use of HRT. PAST MEDICAL HISTORY: has a past medical history of Abnormal EKG, Chest pain, Diverticulosis, History of bilateral breast cancer, Short of breath on exertion, Urinary frequency, and Wears glasses. PAST SURGICAL HISTORY: has past surgical history that includes Splenectomy (N/A); Appendectomy; Colon surgery; Hysterectomy; MONA/BSO    CURRENT MEDICATIONS:  has a current medication list which includes the following prescription(s): anastrozole, fluticasone, mastectomy bra, aspirin, coenzyme q10, nato root, NONFORMULARY, fexofenadine, ibuprofen, calcium & magnesium carbonates, NONFORMULARY, multiple vitamins-minerals, psyllium, NONFORMULARY, and omega-3 fatty acids. ALLERGIES:  is allergic to ciprofloxacin hcl; coffee bean extract [coffea arabica]; darvon [propoxyphene]; levaquin [levofloxacin in d5w]; other; and penicillins. FAMILY HISTORY: There is an history of breast cancer in her sister in her 46s, maternal aunt in her 76s and 2 cousins in their 46s. SOCIAL HISTORY:  reports that she has never smoked. She has never used smokeless tobacco. She reports that she does not drink alcohol or use drugs. REVIEW OF SYSTEMS:     General: no fever or night sweats, Weight is stable   ENT: No double or blurred vision, no tinnitus or hearing problem, no dysphagia or sore throat. Respiratory: No chest pain, no cough or hemoptysis. +occasional shortness of breath  Cardiovascular: Denies chest pain, PND or orthopnea. No L E swelling or palpitations. Gastrointestinal: No nausea or vomiting, abdominal pain, diarrhea or constipation. Genitourinary: Denies dysuria, hematuria, frequency, urgency or incontinence. Neurological: Denies headaches, decreased LOC, no sensory or motor focal deficits. Musculoskeletal: No joint swelling. Muscle and joint pain -resolved.  Leg cramping and burning sensation in feet - resolved  Skin: There are no rashes or bleeding. Psychiatric:  No anxiety, no depression. Insomnia   Endocrine: No diabetes or thyroid disease. Hematologic: No bleeding, no adenopathy. Minimal, intermittent lymphedema in left arm    PHYSICAL EXAM:  The patient is not in acute distress. Vital signs: Blood pressure 138/80, pulse 76, temperature 97 °F (36.1 °C), temperature source Oral, resp. rate 18, height 5' 3\" (1.6 m), weight 176 lb 9.6 oz (80.1 kg), last menstrual period 10/28/2000, not currently breastfeeding. HEENT:  Eyes are normal. Ears, nose and throat are normal.  Neck: Supple. No lymph node enlargement. No thyroid enlargement. Trachea is centrally located. Chest:  Clear to auscultation. No wheezes or crepitations. Breast: S/P bilateral mastectomy, well healed, scar tissue, no infection, no evidence of recurrence, no lymphadenopathy, no nodularity, decreased sensation in both breasts, very subtle lymphedema today. Heart: Regular sinus rhythm. Abdomen: Soft, nontender. No hepatosplenomegaly. No masses. Midline incision (splenectomy and partial colectomy) healed  Extremities:  No edema. Lymph Nodes:  No cervical, axillary or inguinal lymph node enlargement or adenopathy. Neurologic: Conscious and oriented. No focal neurological deficits. Psychosocial: No depression, anxiety or stress. Skin: No rashes, bruises or ecchymoses.       REVIEW OF RADIOLOGICAL RESULTS:      DEXA:         T-SCORE:  05/31/2016 07/19/2018   LUMBAR:        -0.6  -1.0  LEFT HIP:         -0.9  -1.0   FEMORAL NECK: -1.1  -1.4    REVIEW OF LABORATORY DATA:  Lab Results   Component Value Date    WBC 8.5 07/26/2019    HGB 13.6 07/26/2019    HCT 41.9 07/26/2019    MCV 92.1 07/26/2019     07/26/2019       Chemistry        Component Value Date/Time     07/26/2019 1012    K 4.0 07/26/2019 1012     07/26/2019 1012    CO2 23 07/26/2019 1012    BUN 17 07/26/2019 1012    CREATININE 0.71 07/26/2019 1012        Component Value Date/Time    CALCIUM 9.8 07/26/2019 1012    ALKPHOS 72 07/26/2019 1012    AST 28 07/26/2019 1012    ALT 30 07/26/2019 1012    BILITOT 0.40 07/26/2019 1012             IMPRESSION:   1. Stage I (T1b,sN0,M0) ER and MD positive breast cancer. HER-2/jose d is negative on the final specimen  2. 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 S/P bilateral mastectomy. 4. Side effects of Arimidex as discussed. Seems to be well tolerated   5. Osteopenia. On calcium and vitamin D   6. S/P removal of both breast implants       PLAN:   1. I completed toxicity check. 2. We reviewed plan for 5 years of Arimidex to be completed 02/2021.   3. We discussed option for implants following Arimidex and I offered referral to surgeon for consult, she is not interested at this time. 4. I reviewed follow up plan per the guidelines. 5. I am refilling her Arimidex. 6. Return in 6 months.

## 2021-03-18 ENCOUNTER — OFFICE VISIT (OUTPATIENT)
Dept: ONCOLOGY | Age: 71
End: 2021-03-18
Payer: MEDICARE

## 2021-03-18 ENCOUNTER — TELEPHONE (OUTPATIENT)
Dept: ONCOLOGY | Age: 71
End: 2021-03-18

## 2021-03-18 VITALS
DIASTOLIC BLOOD PRESSURE: 81 MMHG | SYSTOLIC BLOOD PRESSURE: 135 MMHG | TEMPERATURE: 97.6 F | HEART RATE: 59 BPM | BODY MASS INDEX: 31.66 KG/M2 | RESPIRATION RATE: 16 BRPM | WEIGHT: 178.7 LBS

## 2021-03-18 DIAGNOSIS — Z17.0 MALIGNANT NEOPLASM OF UPPER-INNER QUADRANT OF LEFT BREAST IN FEMALE, ESTROGEN RECEPTOR POSITIVE (HCC): Primary | ICD-10-CM

## 2021-03-18 DIAGNOSIS — C50.212 MALIGNANT NEOPLASM OF UPPER-INNER QUADRANT OF LEFT BREAST IN FEMALE, ESTROGEN RECEPTOR POSITIVE (HCC): Primary | ICD-10-CM

## 2021-03-18 PROCEDURE — 3017F COLORECTAL CA SCREEN DOC REV: CPT | Performed by: INTERNAL MEDICINE

## 2021-03-18 PROCEDURE — G8484 FLU IMMUNIZE NO ADMIN: HCPCS | Performed by: INTERNAL MEDICINE

## 2021-03-18 PROCEDURE — 1036F TOBACCO NON-USER: CPT | Performed by: INTERNAL MEDICINE

## 2021-03-18 PROCEDURE — 99211 OFF/OP EST MAY X REQ PHY/QHP: CPT

## 2021-03-18 PROCEDURE — 1090F PRES/ABSN URINE INCON ASSESS: CPT | Performed by: INTERNAL MEDICINE

## 2021-03-18 PROCEDURE — 99214 OFFICE O/P EST MOD 30 MIN: CPT | Performed by: INTERNAL MEDICINE

## 2021-03-18 PROCEDURE — 4040F PNEUMOC VAC/ADMIN/RCVD: CPT | Performed by: INTERNAL MEDICINE

## 2021-03-18 PROCEDURE — G8417 CALC BMI ABV UP PARAM F/U: HCPCS | Performed by: INTERNAL MEDICINE

## 2021-03-18 PROCEDURE — 1123F ACP DISCUSS/DSCN MKR DOCD: CPT | Performed by: INTERNAL MEDICINE

## 2021-03-18 PROCEDURE — G8399 PT W/DXA RESULTS DOCUMENT: HCPCS | Performed by: INTERNAL MEDICINE

## 2021-03-18 PROCEDURE — G8427 DOCREV CUR MEDS BY ELIG CLIN: HCPCS | Performed by: INTERNAL MEDICINE

## 2021-03-18 NOTE — PROGRESS NOTES
DIAGNOSIS:   1. Stage I (T1b,N0,M0) ER/RI+ , HER2 -ve , invasive breast cancer in upper outer quadrant of the left breast.  2. 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 Bilateral mastectomy done 12/16/15  2. Trial of arimidex. Stopped due to nausea and vomiting. Resumed later without major problems and completed treatment plan 02/202 and discontinued  3. S/P removal of breast implants. Due to pain. BRIEF CASE HISTORY:   Ms. Ingrid Santoyo is a very pleasant 72 y.o. female who underwent screening mammogram on 9/21/15 , it showed focal asymmetry in the upper outer quadrant of the left breast that was suspicious. Diagnostic mammogram was done on 9/29/15, showed highly suspicious mass at 2:00 position in, upper outer quadrant of the left breast.  There was another area of architectural distortion that was also concerning. Stereotactic biopsy was done on 10/9/15 and that showed invasive grade 2 ductal carcinoma of the breast, the tumor was ER/RI positive, unfortunately, there was not enough tissue to test for HER-2  Patient was sent to us for further discussion, she was very interested in genetic testing based on family history, while BRCA was negative. But showing VUS in MUTYH. Despite negative testing, the patient was still interested in bilateral mastectomy. Surgery was done on 12/16/15. Pathology showed 8mm invasive ductal carcinoma in the upper outer quadrant of the left breast , grade 2. The margins were clear. After surgery, we recommended adjuvant aromatase inhibitor with arimidex. Bilateral implants April 4 2016, nipple reconstruction June 6 2016. She continued to complain of pain in the implants, she ultimately had them removed in 2018. INTERIM HISTORY: Patient is in today for follow up for breast cancer surveillance. She is feeling well with no new concerns or complaints. She is unsure about getting COVID vaccination.  Her implants were removed and her pain has mostly resolved with 1 small area in the right breast. She completed 5 years of AI 2021. GYNECOLOGICAL HISTORY  Menarche at age 7. Menaupause at age 40. +1. Age at first full term pregnancy 25. No use of HRT. PAST MEDICAL HISTORY: has a past medical history of Abnormal EKG, Chest pain, Diverticulosis, History of bilateral breast cancer, Short of breath on exertion, Urinary frequency, and Wears glasses. PAST SURGICAL HISTORY: has past surgical history that includes Splenectomy (N/A); Appendectomy; Colon surgery; Hysterectomy; MONA/BSO    CURRENT MEDICATIONS:  has a current medication list which includes the following prescription(s): fluticasone, mastectomy bra, aspirin, coenzyme q10, nato root, NONFORMULARY, fexofenadine, ibuprofen, calcium & magnesium carbonates, NONFORMULARY, multiple vitamins-minerals, psyllium, NONFORMULARY, omega-3 fatty acids, anastrozole, and anastrozole. ALLERGIES:  is allergic to ciprofloxacin hcl; coffee bean extract [coffea arabica]; darvon [propoxyphene]; levaquin [levofloxacin in d5w]; other; and penicillins. FAMILY HISTORY: There is an history of breast cancer in her sister in her 46s, maternal aunt in her 76s and 2 cousins in their 46s. SOCIAL HISTORY:  reports that she has never smoked. She has never used smokeless tobacco. She reports that she does not drink alcohol or use drugs. REVIEW OF SYSTEMS:     General: No fever or night sweats. Weight is stable   ENT: No double or blurred vision, no tinnitus or hearing problem, no dysphagia or sore throat. Respiratory: No chest pain, no cough or hemoptysis, no shortness of breath  Cardiovascular: Denies chest pain, PND or orthopnea. No L E swelling or palpitations. Gastrointestinal: No nausea or vomiting, abdominal pain, diarrhea or constipation. Genitourinary: Denies dysuria, hematuria, frequency, urgency or incontinence. Neurological: Denies headaches, decreased LOC, no sensory or motor focal deficits. Musculoskeletal: No joint swelling. Skin: There are no rashes or bleeding.+ tenderness around surgical site on the right breast area  Psychiatric:  No anxiety, no depression. Insomnia   Endocrine: No diabetes or thyroid disease. Hematologic: No bleeding, no adenopathy. Minimal, intermittent lymphedema in left arm    PHYSICAL EXAM:  The patient is not in acute distress. Vital signs: Blood pressure 135/81, pulse 59, temperature 97.6 °F (36.4 °C), temperature source Oral, resp. rate 16, weight 178 lb 11.2 oz (81.1 kg), last menstrual period 10/28/2000, not currently breastfeeding. HEENT:  Eyes are normal. Ears, nose and throat are normal.  Neck: Supple. No lymph node enlargement. No thyroid enlargement. Trachea is centrally located. Chest:  Clear to auscultation. No wheezes or crepitations. Breast: S/P bilateral mastectomy, mild tenderness in right incisional area, well healed, scar tissue, no infection, no evidence of recurrence, no lymphadenopathy, no nodularity, decreased sensation in both breasts, very subtle lymphedema today. Heart: Regular sinus rhythm. Abdomen: Soft, nontender. No hepatosplenomegaly. No masses. Midline incision (splenectomy and partial colectomy) healed  Extremities:  No edema. Lymph Nodes:  No cervical, axillary or inguinal lymph node enlargement or adenopathy. Neurologic: Conscious and oriented. No focal neurological deficits. Psychosocial: No depression, anxiety or stress. Skin: No rashes, bruises or ecchymoses.       REVIEW OF RADIOLOGICAL RESULTS:      DEXA:         T-SCORE:  05/31/2016 07/19/2018   LUMBAR:        -0.6  -1.0  LEFT HIP:         -0.9  -1.0   FEMORAL NECK: -1.1  -1.4    REVIEW OF LABORATORY DATA:  Lab Results   Component Value Date    WBC 8.5 07/26/2019    HGB 13.6 07/26/2019    HCT 41.9 07/26/2019    MCV 92.1 07/26/2019     07/26/2019       Chemistry        Component Value Date/Time     07/26/2019 1012    K 4.0 07/26/2019 1012     07/26/2019 1012    CO2 23 07/26/2019 1012    BUN 17 07/26/2019 1012    CREATININE 0.71 07/26/2019 1012        Component Value Date/Time    CALCIUM 9.8 07/26/2019 1012    ALKPHOS 72 07/26/2019 1012    AST 28 07/26/2019 1012    ALT 30 07/26/2019 1012    BILITOT 0.40 07/26/2019 1012             IMPRESSION:   1. Stage I (T1b,sN0,M0) ER and IL positive breast cancer. HER-2/jose d is negative on the final specimen  2. 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 S/P bilateral mastectomy. 4. Side effects of Arimidex as discussed. Seems to be well tolerated   5. Osteopenia. On calcium and vitamin D   6. S/P removal of both breast implants       PLAN:   1. We reviewed her recent lab work, counts and electrolytes are in range, thyroid function is normal.   2. She completed her 5 years of AI on 02/2021 and discontinued. 3. Breast exam is negative for evidence of recurrence, some tenderness around right incision. 4. We discussed options for reconstruction, she is considering. 5. She remains in remission and is doing very well, we reviewed prognostic data. 6. I reviewed plan for yearly follow up per the guidelines. 7. Return in 1 year.

## 2021-07-28 ENCOUNTER — TELEPHONE (OUTPATIENT)
Dept: INFUSION THERAPY | Age: 71
End: 2021-07-28

## 2021-07-28 NOTE — TELEPHONE ENCOUNTER
Orders received and signed, faxed back to that [facility] women with confirmation [address] RN

## 2021-09-24 ENCOUNTER — HOSPITAL ENCOUNTER (OUTPATIENT)
Dept: WOMENS IMAGING | Age: 71
Discharge: HOME | End: 2021-09-26
Payer: MEDICARE

## 2021-09-24 DIAGNOSIS — Z78.0 POST-MENOPAUSAL: ICD-10-CM

## 2021-09-24 PROCEDURE — 77080 DXA BONE DENSITY AXIAL: CPT

## 2021-12-14 ENCOUNTER — HOSPITAL ENCOUNTER (OUTPATIENT)
Age: 71
Setting detail: SPECIMEN
Discharge: HOME | End: 2021-12-14

## 2021-12-14 ENCOUNTER — HOSPITAL ENCOUNTER (OUTPATIENT)
Facility: CLINIC | Age: 71
Discharge: HOME | End: 2021-12-16
Payer: MEDICARE

## 2021-12-14 ENCOUNTER — HOSPITAL ENCOUNTER (OUTPATIENT)
Dept: GENERAL RADIOLOGY | Facility: CLINIC | Age: 71
Discharge: HOME | End: 2021-12-16
Payer: MEDICARE

## 2021-12-14 DIAGNOSIS — R07.89 RIGHT-SIDED CHEST WALL PAIN: ICD-10-CM

## 2021-12-14 DIAGNOSIS — R10.11 RUQ PAIN: ICD-10-CM

## 2021-12-14 LAB
ALBUMIN SERPL-MCNC: 4 G/DL (ref 3.5–5.2)
ALBUMIN/GLOBULIN RATIO: 1.2 (ref 1–2.5)
ALP BLD-CCNC: 101 U/L (ref 35–104)
ALT SERPL-CCNC: 30 U/L (ref 5–33)
ANION GAP SERPL CALCULATED.3IONS-SCNC: 15 MMOL/L (ref 9–17)
AST SERPL-CCNC: 33 U/L
BILIRUB SERPL-MCNC: <0.1 MG/DL (ref 0.3–1.2)
BUN BLDV-MCNC: 24 MG/DL (ref 8–23)
BUN/CREAT BLD: ABNORMAL (ref 9–20)
CALCIUM SERPL-MCNC: 9.3 MG/DL (ref 8.6–10.4)
CHLORIDE BLD-SCNC: 106 MMOL/L (ref 98–107)
CO2: 21 MMOL/L (ref 20–31)
CREAT SERPL-MCNC: 0.63 MG/DL (ref 0.5–0.9)
GFR AFRICAN AMERICAN: >60 ML/MIN
GFR NON-AFRICAN AMERICAN: >60 ML/MIN
GFR SERPL CREATININE-BSD FRML MDRD: ABNORMAL ML/MIN/{1.73_M2}
GFR SERPL CREATININE-BSD FRML MDRD: ABNORMAL ML/MIN/{1.73_M2}
GLUCOSE BLD-MCNC: 81 MG/DL (ref 70–99)
HCT VFR BLD CALC: 44.7 % (ref 36.3–47.1)
HEMOGLOBIN: 14.5 G/DL (ref 11.9–15.1)
LIPASE: 66 U/L (ref 13–60)
MCH RBC QN AUTO: 30.8 PG (ref 25.2–33.5)
MCHC RBC AUTO-ENTMCNC: 32.4 G/DL (ref 28.4–34.8)
MCV RBC AUTO: 94.9 FL (ref 82.6–102.9)
NRBC AUTOMATED: 0 PER 100 WBC
PDW BLD-RTO: 12.7 % (ref 11.8–14.4)
PLATELET # BLD: 315 K/UL (ref 138–453)
PMV BLD AUTO: 10.8 FL (ref 8.1–13.5)
POTASSIUM SERPL-SCNC: 4.7 MMOL/L (ref 3.7–5.3)
RBC # BLD: 4.71 M/UL (ref 3.95–5.11)
SODIUM BLD-SCNC: 142 MMOL/L (ref 135–144)
TOTAL PROTEIN: 7.3 G/DL (ref 6.4–8.3)
WBC # BLD: 6.7 K/UL (ref 3.5–11.3)

## 2021-12-14 PROCEDURE — 71046 X-RAY EXAM CHEST 2 VIEWS: CPT

## 2021-12-16 ENCOUNTER — HOSPITAL ENCOUNTER (OUTPATIENT)
Dept: ULTRASOUND IMAGING | Age: 71
Discharge: HOME | End: 2021-12-18
Payer: MEDICARE

## 2021-12-16 DIAGNOSIS — R10.11 RUQ PAIN: ICD-10-CM

## 2021-12-16 PROCEDURE — 76705 ECHO EXAM OF ABDOMEN: CPT

## 2021-12-30 ENCOUNTER — HOSPITAL ENCOUNTER (OUTPATIENT)
Dept: MRI IMAGING | Age: 71
Discharge: HOME | End: 2022-01-01
Payer: MEDICARE

## 2021-12-30 PROCEDURE — 74181 MRI ABDOMEN W/O CONTRAST: CPT

## 2022-01-31 ENCOUNTER — OFFICE VISIT (OUTPATIENT)
Dept: GASTROENTEROLOGY | Age: 72
End: 2022-01-31
Payer: MEDICARE

## 2022-01-31 ENCOUNTER — HOSPITAL ENCOUNTER (OUTPATIENT)
Age: 72
Setting detail: SPECIMEN
Discharge: HOME | End: 2022-01-31
Payer: MEDICARE

## 2022-01-31 VITALS
SYSTOLIC BLOOD PRESSURE: 114 MMHG | WEIGHT: 172.1 LBS | HEIGHT: 63 IN | DIASTOLIC BLOOD PRESSURE: 64 MMHG | OXYGEN SATURATION: 98 % | BODY MASS INDEX: 30.49 KG/M2 | HEART RATE: 74 BPM | TEMPERATURE: 97.8 F

## 2022-01-31 DIAGNOSIS — K74.60 HEPATIC CIRRHOSIS, UNSPECIFIED HEPATIC CIRRHOSIS TYPE, UNSPECIFIED WHETHER ASCITES PRESENT (HCC): Primary | ICD-10-CM

## 2022-01-31 DIAGNOSIS — K76.9 LESION OF LIVER: ICD-10-CM

## 2022-01-31 DIAGNOSIS — R79.89 ABNORMAL LIVER FUNCTION TESTS: ICD-10-CM

## 2022-01-31 DIAGNOSIS — K74.60 HEPATIC CIRRHOSIS, UNSPECIFIED HEPATIC CIRRHOSIS TYPE, UNSPECIFIED WHETHER ASCITES PRESENT (HCC): ICD-10-CM

## 2022-01-31 PROCEDURE — G8427 DOCREV CUR MEDS BY ELIG CLIN: HCPCS | Performed by: INTERNAL MEDICINE

## 2022-01-31 PROCEDURE — 1090F PRES/ABSN URINE INCON ASSESS: CPT | Performed by: INTERNAL MEDICINE

## 2022-01-31 PROCEDURE — 84466 ASSAY OF TRANSFERRIN: CPT

## 2022-01-31 PROCEDURE — 99204 OFFICE O/P NEW MOD 45 MIN: CPT | Performed by: INTERNAL MEDICINE

## 2022-01-31 PROCEDURE — 1123F ACP DISCUSS/DSCN MKR DOCD: CPT | Performed by: INTERNAL MEDICINE

## 2022-01-31 PROCEDURE — 82104 ALPHA-1-ANTITRYPSIN PHENO: CPT

## 2022-01-31 PROCEDURE — 86225 DNA ANTIBODY NATIVE: CPT

## 2022-01-31 PROCEDURE — G8417 CALC BMI ABV UP PARAM F/U: HCPCS | Performed by: INTERNAL MEDICINE

## 2022-01-31 PROCEDURE — 1036F TOBACCO NON-USER: CPT | Performed by: INTERNAL MEDICINE

## 2022-01-31 PROCEDURE — 36415 COLL VENOUS BLD VENIPUNCTURE: CPT

## 2022-01-31 PROCEDURE — 80074 ACUTE HEPATITIS PANEL: CPT

## 2022-01-31 PROCEDURE — 86038 ANTINUCLEAR ANTIBODIES: CPT

## 2022-01-31 PROCEDURE — G8484 FLU IMMUNIZE NO ADMIN: HCPCS | Performed by: INTERNAL MEDICINE

## 2022-01-31 PROCEDURE — G8399 PT W/DXA RESULTS DOCUMENT: HCPCS | Performed by: INTERNAL MEDICINE

## 2022-01-31 PROCEDURE — 83516 IMMUNOASSAY NONANTIBODY: CPT

## 2022-01-31 PROCEDURE — 3017F COLORECTAL CA SCREEN DOC REV: CPT | Performed by: INTERNAL MEDICINE

## 2022-01-31 PROCEDURE — 82105 ALPHA-FETOPROTEIN SERUM: CPT

## 2022-01-31 PROCEDURE — 4040F PNEUMOC VAC/ADMIN/RCVD: CPT | Performed by: INTERNAL MEDICINE

## 2022-01-31 PROCEDURE — 82103 ALPHA-1-ANTITRYPSIN TOTAL: CPT

## 2022-01-31 PROCEDURE — 82390 ASSAY OF CERULOPLASMIN: CPT

## 2022-01-31 ASSESSMENT — ENCOUNTER SYMPTOMS
DIARRHEA: 0
CONSTIPATION: 1
ABDOMINAL PAIN: 0
SHORTNESS OF BREATH: 0
ANAL BLEEDING: 0
RECTAL PAIN: 0
COUGH: 1
TROUBLE SWALLOWING: 0
NAUSEA: 0
BLOOD IN STOOL: 0
BACK PAIN: 1
VOMITING: 0
CHOKING: 0
VOICE CHANGE: 0
SORE THROAT: 0
ABDOMINAL DISTENTION: 1

## 2022-01-31 NOTE — PROGRESS NOTES
Reason for Referral:   Jose Garces, APRN - CNP  [address] [facility]  [address] [facility],[address]  [place],  [address] [facility]    Chief Complaint   Patient presents with    New Patient     Patient is here today as a new patient    GI Problem     Patient is here today due fatty liver       1. Hepatic cirrhosis, unspecified hepatic cirrhosis type, unspecified whether ascites present ([address]    2. Lesion of liver    3. Abnormal liver function tests            HISTORY OF PRESENT ILLNESS: Jenifer Bowling is a 80 y.o. female with a past history remarkable for 2, referred for evaluation of   Chief Complaint   Patient presents with   [facility] Patient     Patient is here today as a new patient    GI Problem     Patient is here today due fatty liver   . Patient seen regarding abnormal imaging of the liver. This patient has vague nonspecific upper abdominal discomfort couple of months ago. At that time it appears that she was taking NSAID for musculoskeletal pain. To further evaluate her discomfort patient had ultrasound of the liver and gallbladder that revealed questionable lesions in the liver. Subsequently patient was ordered to have MRI of the liver without intravenous contrast by the PCP. This MRI did reveal 4.6 cm lesion along the dome of the liver. On further discussion patient states after stopping NSAIDs her upper abdominal discomfort completely resolved. She denies upper abdominal pain, nausea vomiting. She has a good appetite. No fever chills. No weight loss. Bowel movements has been satisfactory. Patient states that she had several colonoscopies in the last 10 years. The last colonoscopy was about 4 years ago and at that time the exam was nonspecific. Not known to have liver disease in the past.  No family history of liver disease. Her father had diabetes. Patient is not on any medication that can induce liver disease. She has minimal abnormalities of transaminases.   Albumin, alkaline phosphatase, mouth daily (Patient taking differently: Take 180 mg by mouth daily as needed ), Disp: 30 tablet, Rfl: 1    ibuprofen (ADVIL;MOTRIN) 600 MG tablet, Take 1 tablet by mouth every 6 hours as needed for Pain, Disp: 120 tablet, Rfl: 1    CALCIUM & MAGNESIUM CARBONATES PO, Take 1 tablet by mouth daily , Disp: , Rfl:     NONFORMULARY, Take 1 capsule by mouth 2 times daily (with meals) [facility]: Natural plant sterols, Disp: , Rfl:     Multiple Vitamins-Minerals (MULTI VITAMIN/MINERALS PO), Take 1 tablet by mouth daily , Disp: , Rfl:     Psyllium (METAMUCIL PO), Take by mouth fibermucil brand, Disp: , Rfl:     NONFORMULARY, Take 1 capsule by mouth 2 times daily Circulation and vein support, Disp: , Rfl:     Omega-3 Fatty Acids (OMEGA 3 PO), Take 1 capsule by mouth daily  mg,  mg, DHA 50 mg , Disp: , Rfl:     ALLERGIES:   Allergies   Allergen Reactions    Ciprofloxacin Hcl Itching     tendonitis    Coffee Bean Extract [Coffea Arabica] Nausea Only    Darvon [Propoxyphene] Other (See Comments)     Patient relates it increased pain rather than decreased pain.  Levaquin [Levofloxacin In D5w] Other (See Comments)     tendonitits    Other Other (See Comments)     Fluoroquinolones cause patient to have tendonitis    Penicillins Hives       FAMILY HISTORY:       Problem Relation Age of Onset    Arthritis Mother     Heart Disease Mother         Paula Montes    Stroke Mother     Arthritis Father     Heart Disease Father     Diabetes Father     Cancer Father         prostate, melanoma    Cancer Sister         breast    Cancer Brother         kidney    Mental Retardation Paternal Grandmother          SOCIAL HISTORY:   Social History     Socioeconomic History    Marital status:       Spouse name: Not on file    Number of children: Not on file    Years of education: Not on file    Highest education level: Not on file   Occupational History    Not on file   Tobacco Use    Smoking status: Never Smoker    Smokeless tobacco: Never Used   Vaping Use    Vaping Use: Never used   Substance and Sexual Activity    Alcohol use: No     Alcohol/week: 0.0 standard drinks    Drug use: No    Sexual activity: Not on file   Other Topics Concern    Not on file   Social History Narrative    Not on file     Social Determinants of Health     Financial Resource Strain: Low Risk     Difficulty of Paying Living Expenses: Not hard at all   Food Insecurity: No Food Insecurity    Worried About [address] in the Last Year: Never true    [facility] in the Last Year: Never true   Transportation Needs:     Lack of Transportation (Medical): Not on file    Lack of Transportation (Non-Medical): Not on file   Physical Activity:     Days of Exercise per Week: Not on file    Minutes of Exercise per Session: Not on file   Stress:     Feeling of Stress : Not on file   Social Connections:     Frequency of Communication with Friends and Family: Not on file    Frequency of Social Gatherings with Friends and Family: Not on file    Attends Spiritism Services: Not on file    Active Member of [address] [place] [address] or Organizations: Not on file    Attends Club or Organization Meetings: Not on file    Marital Status: Not on file   Intimate Partner Violence:     Fear of Current or Ex-Partner: Not on file    Emotionally Abused: Not on file    Physically Abused: Not on file    Sexually Abused: Not on file   Housing Stability:     Unable to Pay for Housing in the Last Year: Not on file    Number of [place] in the Last Year: Not on file    Unstable Housing in the Last Year: Not on file       REVIEW OF SYSTEMS:       Review of Systems   Constitutional: Negative for appetite change, fatigue and unexpected weight change. HENT: Negative for dental problem, sore throat, trouble swallowing and voice change. Eyes: Negative for visual disturbance (glasses). Respiratory: Positive for cough.  Negative for choking and shortness of breath. Cardiovascular: Negative for chest pain and leg swelling. Gastrointestinal: Positive for abdominal distention and constipation (mild). Negative for abdominal pain, anal bleeding, blood in stool, diarrhea, nausea, rectal pain and vomiting. Genitourinary: Negative for difficulty urinating. Musculoskeletal: Positive for back pain (lower). Negative for joint swelling and myalgias. Neurological: Negative for dizziness, tremors, weakness, light-headedness, numbness and headaches. Hematological: Bruises/bleeds easily. Psychiatric/Behavioral: Negative for sleep disturbance. The patient is not nervous/anxious. PHYSICAL EXAMINATION: Vital signs reviewed per the nursing documentation. /64   Pulse 74   Temp 97.8 °F (36.6 °C)   Ht 5' 3\" (1.6 m)   Wt 172 lb 1.6 oz (78.1 kg)   LMP 10/28/2000   SpO2 98%   BMI 30.49 kg/m²   Body mass index is 30.49 kg/m². Physical Exam      LABORATORY DATA: Reviewed  Lab Results   Component Value Date    WBC 6.7 12/14/2021    HGB 14.5 12/14/2021    HCT 44.7 12/14/2021    MCV 94.9 12/14/2021     12/14/2021     12/14/2021    K 4.7 12/14/2021     12/14/2021    CO2 21 12/14/2021    BUN 24 (H) 12/14/2021    CREATININE 0.63 12/14/2021    LABALBU 4.0 12/14/2021    BILITOT <0.10 (L) 12/14/2021    ALKPHOS 101 12/14/2021    AST 33 (H) 12/14/2021    ALT 30 12/14/2021         Lab Results   Component Value Date    RBC 4.71 12/14/2021    HGB 14.5 12/14/2021    MCV 94.9 12/14/2021    MCH 30.8 12/14/2021    MCHC 32.4 12/14/2021    RDW 12.7 12/14/2021    MPV 10.8 12/14/2021    BASOPCT 1 07/26/2019    LYMPHSABS 1.72 07/26/2019    MONOSABS 0.71 07/26/2019    NEUTROABS 5.86 07/26/2019    EOSABS 0.10 07/26/2019    BASOSABS 0.06 07/26/2019         DIAGNOSTIC TESTING:     No results found. IMPRESSION: Ms. Farshad Mcfarland is a 80 y.o. female with     Assessment:  1.  Hepatic cirrhosis, unspecified hepatic cirrhosis type, unspecified whether ascites present ([facility] [address]    2. Lesion of liver    3. Abnormal liver function tests        Plan: At present patient is asymptomatic. She does have questionable abnormalities of the liver/chronic liver disease basing on the imaging studies. Etiology of this is not clear. Need to evaluate metabolic liver diseases. Advised to have work-up, patient understood and agreed. As far as the liver lesion is concerned, I did review the imaging studies with the radiologist and concluded that she may not need further work-up regarding these lesions at this time. Patient is reassured. It appears that these lesions are benign cystic lesions of the liver. Advised to contact me in the next 1 to 2 weeks regarding liver disease work-up. Basing on the results we will follow further management. Spent 30 minutes providing patient education and counseling. Thank you for allowing me to participate in the care of Ms. Rubio. For any further questions please do not hesitate to contact me. Note is dictated utilizing voice recognition software. Unfortunately this leads to occasional typographical errors. Please contact our office if you have any questions. I have reviewed and agree with the MA/LPN ROS.      Kirby Zaman MD, [facility]  Board Certified in Gastroenterology and [address] [place] [address] Gastroenterology  Office #: [phone]

## 2022-02-01 LAB
AFP: 2.7 UG/L
CERULOPLASMIN: 33 MG/DL (ref 16–45)
HAV IGM SER IA-ACNC: NONREACTIVE
HEPATITIS B CORE IGM ANTIBODY: NONREACTIVE
HEPATITIS B SURFACE ANTIGEN: NONREACTIVE
HEPATITIS C ANTIBODY: NONREACTIVE
TRANSFERRIN: 262 MG/DL (ref 200–360)

## 2022-02-03 LAB
ANTI DNA DOUBLE STRANDED: 1.1 IU/ML
ANTI-NUCLEAR ANTIBODY (ANA): NEGATIVE
ENA ANTIBODIES SCREEN: 0.4 U/ML
MITOCHONDRIAL ANTIBODY: 88 U/ML (ref 0–4)

## 2022-02-05 LAB
ALPHA-1 ANTITRYPSIN PHENOTYPE: NORMAL
ALPHA-1 ANTITRYPSIN: 123 MG/DL (ref 90–200)

## 2022-02-08 ENCOUNTER — TELEPHONE (OUTPATIENT)
Dept: GASTROENTEROLOGY | Age: 72
End: 2022-02-08

## 2022-02-08 DIAGNOSIS — K74.3 HEPATIC CIRRHOSIS DUE TO PRIMARY BILIARY CHOLANGITIS (HCC): Primary | ICD-10-CM

## 2022-02-08 NOTE — TELEPHONE ENCOUNTER
Patient called on 2/8/22 and stated that she was told by Dr. Artemio Avitia to call the office for her results. Patient had lab work done on 1/31/22. Writer will consult with doctor and get results and recommendations and call the patient back. Patient verbalized understanding and thanked the writer.

## 2022-02-09 NOTE — TELEPHONE ENCOUNTER
Patient was called on 2/9/22. Kemar advised the patient to get a Liver biopsy and some additional labs completed. Patient verbalized understanding and thanked the writer.

## 2022-02-21 ENCOUNTER — HOSPITAL ENCOUNTER (OUTPATIENT)
Dept: NEUROLOGY | Age: 72
Discharge: HOME | End: 2022-02-21
Payer: MEDICARE

## 2022-02-21 PROCEDURE — 95886 MUSC TEST DONE W/N TEST COMP: CPT | Performed by: PHYSICAL MEDICINE & REHABILITATION

## 2022-02-21 PROCEDURE — 95909 NRV CNDJ TST 5-6 STUDIES: CPT | Performed by: PHYSICAL MEDICINE & REHABILITATION

## 2022-02-22 RX ORDER — SODIUM CHLORIDE 9 MG/ML
INJECTION, SOLUTION INTRAVENOUS CONTINUOUS
Status: CANCELLED | OUTPATIENT
Start: 2022-02-22

## 2022-02-23 ENCOUNTER — HOSPITAL ENCOUNTER (OUTPATIENT)
Dept: INTERVENTIONAL RADIOLOGY/VASCULAR | Age: 72
Discharge: HOME | End: 2022-02-25
Payer: MEDICARE

## 2022-02-23 VITALS
RESPIRATION RATE: 14 BRPM | SYSTOLIC BLOOD PRESSURE: 110 MMHG | HEART RATE: 58 BPM | OXYGEN SATURATION: 99 % | DIASTOLIC BLOOD PRESSURE: 61 MMHG | TEMPERATURE: 97.3 F

## 2022-02-23 DIAGNOSIS — K74.3 HEPATIC CIRRHOSIS DUE TO PRIMARY BILIARY CHOLANGITIS (HCC): ICD-10-CM

## 2022-02-23 LAB
HCT VFR BLD CALC: 42 % (ref 36–46)
HEMOGLOBIN: 14 G/DL (ref 12–16)
INR BLD: 1
MCH RBC QN AUTO: 30.6 PG (ref 26–34)
MCHC RBC AUTO-ENTMCNC: 33.4 G/DL (ref 31–37)
MCV RBC AUTO: 91.6 FL (ref 80–100)
PARTIAL THROMBOPLASTIN TIME: 29.1 SEC (ref 24–36)
PDW BLD-RTO: 13.7 % (ref 11.5–14.9)
PLATELET # BLD: 415 K/UL (ref 150–450)
PMV BLD AUTO: 8.1 FL (ref 6–12)
PROTHROMBIN TIME: 12.9 SEC (ref 11.8–14.6)
RBC # BLD: 4.58 M/UL (ref 4–5.2)
WBC # BLD: 6.8 K/UL (ref 3.5–11)

## 2022-02-23 PROCEDURE — 7100000010 HC PHASE II RECOVERY - FIRST 15 MIN

## 2022-02-23 PROCEDURE — 47000 NEEDLE BIOPSY OF LIVER PERQ: CPT

## 2022-02-23 PROCEDURE — 7100000011 HC PHASE II RECOVERY - ADDTL 15 MIN

## 2022-02-23 PROCEDURE — 2709999900 IR BIOPSY LIVER PERCUTANEOUS

## 2022-02-23 PROCEDURE — 88313 SPECIAL STAINS GROUP 2: CPT

## 2022-02-23 PROCEDURE — 7100000031 HC ASPR PHASE II RECOVERY - ADDTL 15 MIN

## 2022-02-23 PROCEDURE — 85730 THROMBOPLASTIN TIME PARTIAL: CPT

## 2022-02-23 PROCEDURE — 36415 COLL VENOUS BLD VENIPUNCTURE: CPT

## 2022-02-23 PROCEDURE — 7100000030 HC ASPR PHASE II RECOVERY - FIRST 15 MIN

## 2022-02-23 PROCEDURE — 88307 TISSUE EXAM BY PATHOLOGIST: CPT

## 2022-02-23 PROCEDURE — 85610 PROTHROMBIN TIME: CPT

## 2022-02-23 PROCEDURE — 85027 COMPLETE CBC AUTOMATED: CPT

## 2022-02-23 PROCEDURE — 76942 ECHO GUIDE FOR BIOPSY: CPT

## 2022-02-23 RX ORDER — SODIUM CHLORIDE 9 MG/ML
INJECTION, SOLUTION INTRAVENOUS CONTINUOUS
Status: DISCONTINUED | OUTPATIENT
Start: 2022-02-23 | End: 2022-02-26 | Stop reason: HOSPADM

## 2022-02-23 RX ORDER — ACETAMINOPHEN 325 MG/1
650 TABLET ORAL EVERY 4 HOURS PRN
Status: DISCONTINUED | OUTPATIENT
Start: 2022-02-23 | End: 2022-02-26 | Stop reason: HOSPADM

## 2022-02-23 NOTE — PROGRESS NOTES
Ultrasound in use. Pt supine on cart. Right side of abdomen prepped and draped for liver biopsy. Numbed and accessed per Dr. Blessing Denny. 4 core biopsies obtained and sent to lab. Access removed by Dr. Blessing Denny. Band aid applied to site. Pt tolerated procedure without difficulty. Patient back to short stay; report called.

## 2022-02-23 NOTE — OP NOTE
Brief Postoperative Note    Little Ames  YOB: 1950  [id number]    Pre-operative Diagnosis: liver bx    Post-operative Diagnosis: Same    Procedure: liver bx, US    Anesthesia: Local   Surgeons/Assistants: Geo Krueger MD     Estimated Blood Loss: minimal    Complications: none immediate    Specimens: were obtained      Electronically signed by Geo Krueger MD on 2/23/2022 at 11:18 AM

## 2022-02-23 NOTE — H&P
HISTORY and [facility] [id number]       NAME:  Olga Lidia Bacon  MRN: [id number]   YOB: 1950   Date: 2/23/2022   Age: 80 y.o. 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 hepatic cirrhosis,  liver cyst, fatty liver, non alcoholic. Patient  has hx of abd pain with onset of couple mos. ago. She states the pain was in more in the epigastric area that traveled around the right side to her back. Patient states that pain went on for approx a week. She also mentions that incidentally, she had been doing some moving at home with heavy lifting and suspected to be muscle strain. .  she had been taking NSAID/ Motrin for muscular skeletal  pain. Pt had recent US liver revealing questionable lesions and was recommended to follow with MRI , it did reveal 4.6 cm lesion along the dome of liver. Patient presently denies any abdominal pain. No nausea, vomiting, diarrhea or constipation. No changes in bowel habits. No weight loss. Last Colonoscopy done 4 years ago. Patient admits to external hemorrhoids and states that she may wipe small amount of blood from time to time. Pt denies any alcohol abuse, no FH of liver cancer. significant medical history- hx of rare chest pain-in 2015 neg stress  Patient is on aspirin and was held for week. Patient has been NPO. Pt does express some anxiety. She initially had elevated BP of 095'G systolic. She denies any HA or dizziness. BP eventually normalized. Patient will be meeting with oncology upcoming.      Impression  MRI ABDOMEN WO CONTRAST MRCP  12/30/2022  Evaluation of the liver is suboptimal due to lack of IV contrast.  The liver  appears mildly cirrhotic with hepatic steatosis noted.  Several T2  hyperintense lesions are identified within the liver, most too small for  accurate characterization largest seen along the dome the liver likely  representing a cyst.  Hepatocellular carcinoma is of concern, repeat study  with IV contrast is recommended.     PAST MEDICAL HISTORY     Past Medical History:   Diagnosis Date    Abnormal EKG 12/2015    Chest pain     rare    Diverticulosis     History of bilateral breast cancer 10/2015    Short of breath on exertion     Urinary frequency     Wears glasses        SURGICAL HISTORY       Past Surgical History:   Procedure Laterality Date    APPENDECTOMY      BREAST BIOPSY Left 10/2015    cancer    BREAST IMPLANT REMOVAL  2019    COLON SURGERY      removal part due to endometriosis    COLONOSCOPY      multiple    CYST REMOVAL Left     knee    ENDOSCOPY, COLON, DIAGNOSTIC      LIPOMA RESECTION Right     breast    MASTECTOMY, BILATERAL  12/2015    TX COLON CA SCRN NOT HI RSK IND N/A 8/22/2017    COLONOSCOPY and PHOTOS performed by Karan Alexis DO at [address] [place] [address] W/O INSERTION IMPLANT Bilateral 7/11/2018    BREAST IMPLANT REMOVAL performed by Bassam Olivas [facility] at [facility]      UPPER GASTROINTESTINAL ENDOSCOPY         FAMILY HISTORY       Family History   Problem Relation Age of Onset    Arthritis Mother     Heart Disease Mother         chf    Stroke Mother     Arthritis Father     Heart Disease Father     Diabetes Father     Cancer Father         prostate, melanoma    Cancer Sister         breast    Cancer Brother         kidney    Mental Retardation Paternal Grandmother        SOCIAL HISTORY       Social History     Socioeconomic History    Marital status:       Spouse name: Not on file    Number of children: Not on file    Years of education: Not on file    Highest education level: Not on file   Occupational History    Not on file   Tobacco Use    Smoking status: Never Smoker    Smokeless tobacco: Never Used   Vaping Use    Vaping Use: Never used Substance and Sexual Activity    Alcohol use: No     Alcohol/week: 0.0 standard drinks    Drug use: No    Sexual activity: Not on file   Other Topics Concern    Not on file   Social History Narrative    Not on file     Social Determinants of Health     Financial Resource Strain: Low Risk     Difficulty of Paying Living Expenses: Not hard at all   Food Insecurity: No Food Insecurity    Worried About Running Out of Food in the Last Year: Never true    [address] in the Last Year: Never true   Transportation Needs:     Lack of Transportation (Medical): Not on file    Lack of Transportation (Non-Medical): Not on file   Physical Activity:     Days of Exercise per Week: Not on file    Minutes of Exercise per Session: Not on file   Stress:     Feeling of Stress : Not on file   Social Connections:     Frequency of Communication with Friends and Family: Not on file    Frequency of Social Gatherings with Friends and Family: Not on file    Attends [employer] Services: Not on file    Active Member of [address] [place] [address] or Organizations: Not on file    Attends Club or Organization Meetings: Not on file    Marital Status: Not on file   Intimate Partner Violence:     Fear of Current or Ex-Partner: Not on file    Emotionally Abused: Not on file    Physically Abused: Not on file    Sexually Abused: Not on file   Housing Stability:     Unable to Pay for Housing in the Last Year: Not on file    Number of [place] in the Last Year: Not on file    Unstable Housing in the Last Year: Not on file           REVIEW OF SYSTEMS      Allergies   Allergen Reactions    Ciprofloxacin Hcl Itching     tendonitis    Coffee Bean Extract [Coffea Arabica] Nausea Only    Darvon [Propoxyphene] Other (See Comments)     Patient relates it increased pain rather than decreased pain.     Levaquin [Levofloxacin In D5w] Other (See Comments)     tendonitits    Other Other (See Comments)     Fluoroquinolones cause patient to have tendonitis    Penicillins Hives       Current Outpatient Medications on File Prior to Encounter   Medication Sig Dispense Refill    aspirin 81 MG chewable tablet Take 81 mg by mouth daily      ibuprofen (ADVIL;MOTRIN) 600 MG tablet Take 1 tablet by mouth every 6 hours as needed for Pain 120 tablet 1    omeprazole (PRILOSEC) 20 MG delayed release capsule Take 1 capsule by mouth every morning (before breakfast) 90 capsule 1    Mastectomy Bra MISC by Does not apply route 1 each 0    Coenzyme Q10 (CO Q-10 PO) Take 1 capsule by mouth daily      Ginger, Zingiber officinalis, (GINGER ROOT) 550 MG CAPS Take 550 mg by mouth as needed      NONFORMULARY Take 500 mg by mouth daily Indications: Tumeric Capsules OTC      fexofenadine (ALLEGRA ALLERGY) 180 MG tablet Take 1 tablet by mouth daily (Patient taking differently: Take 180 mg by mouth daily as needed ) 30 tablet 1    CALCIUM & MAGNESIUM CARBONATES PO Take 1 tablet by mouth daily       NONFORMULARY Take 1 capsule by mouth 2 times daily (with meals) [facility]: Natural plant sterols      Multiple Vitamins-Minerals (MULTI VITAMIN/MINERALS PO) Take 1 tablet by mouth daily       Psyllium (METAMUCIL PO) Take by mouth fibermucil brand      NONFORMULARY Take 1 capsule by mouth 2 times daily Circulation and vein support      Omega-3 Fatty Acids (OMEGA 3 PO) Take 1 capsule by mouth daily  mg,  mg, DHA 50 mg        No current facility-administered medications on file prior to encounter. Negative except for what is mentioned in the HPI. GENERAL PHYSICAL EXAM     Vitals: /63   Pulse 54   Temp 97.2 °F (36.2 °C) (Temporal)   Resp 18   LMP 10/28/2000   SpO2 95%  There is no height or weight on file to calculate BMI. GENERAL APPEARANCE:   Umm Kent is 80 y.o., female, moderately obese, nourished, conscious, alert. Does not appear to be distress or pain at this time.                             SKIN:  Warm, dry, no cyanosis or jaundice. HEAD:  Normocephalic, atraumatic, no swelling or tenderness. EYES:  Pupils equal, reactive to light. EARS:  No discharge, no marked hearing loss. NOSE:  No rhinorrhea, epistaxis or septal deformity. THROAT:  Not congested. No ulceration bleeding or discharge. NECK:  No stiffness, trachea central.  No palpable masses or L.N.                 CHEST:  Symmetrical and equal on expansion. Bilateral mastectomy               HEART:  RRR . No audible murmurs or gallops. LUNGS:  Equal on expansion, normal breath sounds. No adventitious sounds. ABDOMEN:  Obese. Soft on palpation. No dysphagia, No localized tenderness. No guarding or rigidity. LYMPHATICS:  No palpable cervical lymphadenopathy. LOCOMOTOR, BACK AND SPINE:  No tenderness or deformities. EXTREMITIES:  Symmetrical, no pretibial edema. No discoloration or ulcerations. NEUROLOGIC:  The patient is conscious, alert, oriented,Cranial nerve II-XII intact, taste and smell were not examined. No apparent focal sensory or motor deficits.              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

## 2022-02-25 LAB — SURGICAL PATHOLOGY REPORT: NORMAL

## 2022-03-14 ENCOUNTER — TELEPHONE (OUTPATIENT)
Dept: GASTROENTEROLOGY | Age: 72
End: 2022-03-14

## 2022-03-14 NOTE — TELEPHONE ENCOUNTER
Patient called the office on 3/14/22 and inquired about her liver biopsy that was completed on 2/23/22. Writer stated that she will send Gabby Ramirez a message and have the results reviewed. Writer stated that she will call the patient back with any results or recommendations Gabby Ramirez has. Patient verbalized understanding and thanked the writer.

## 2022-03-16 NOTE — TELEPHONE ENCOUNTER
Writer spoke with Leena Mcclelland and stated there is no malignancy and there is mild fibrosis in the liver. Leena Mcclelland advise the patient have a follow up appointment. Writer called the patient with the results.   Patient verbalized understanding and scheduled to come in the office on 4/13/22 with Jack at 11:30 am.

## 2022-03-17 ENCOUNTER — OFFICE VISIT (OUTPATIENT)
Dept: ONCOLOGY | Age: 72
End: 2022-03-17
Payer: MEDICARE

## 2022-03-17 ENCOUNTER — TELEPHONE (OUTPATIENT)
Dept: ONCOLOGY | Age: 72
End: 2022-03-17

## 2022-03-17 VITALS
SYSTOLIC BLOOD PRESSURE: 132 MMHG | HEART RATE: 58 BPM | TEMPERATURE: 96.3 F | DIASTOLIC BLOOD PRESSURE: 81 MMHG | WEIGHT: 171.4 LBS | BODY MASS INDEX: 30.36 KG/M2

## 2022-03-17 DIAGNOSIS — C50.212 MALIGNANT NEOPLASM OF UPPER-INNER QUADRANT OF LEFT BREAST IN FEMALE, ESTROGEN RECEPTOR POSITIVE (HCC): Primary | ICD-10-CM

## 2022-03-17 DIAGNOSIS — Z17.0 MALIGNANT NEOPLASM OF UPPER-INNER QUADRANT OF LEFT BREAST IN FEMALE, ESTROGEN RECEPTOR POSITIVE (HCC): Primary | ICD-10-CM

## 2022-03-17 PROCEDURE — G8417 CALC BMI ABV UP PARAM F/U: HCPCS | Performed by: INTERNAL MEDICINE

## 2022-03-17 PROCEDURE — G8484 FLU IMMUNIZE NO ADMIN: HCPCS | Performed by: INTERNAL MEDICINE

## 2022-03-17 PROCEDURE — G8399 PT W/DXA RESULTS DOCUMENT: HCPCS | Performed by: INTERNAL MEDICINE

## 2022-03-17 PROCEDURE — MISCBPR3 BREAST PROSTHETIC - BILATERAL: Performed by: INTERNAL MEDICINE

## 2022-03-17 PROCEDURE — 1090F PRES/ABSN URINE INCON ASSESS: CPT | Performed by: INTERNAL MEDICINE

## 2022-03-17 PROCEDURE — 99214 OFFICE O/P EST MOD 30 MIN: CPT | Performed by: INTERNAL MEDICINE

## 2022-03-17 PROCEDURE — 1123F ACP DISCUSS/DSCN MKR DOCD: CPT | Performed by: INTERNAL MEDICINE

## 2022-03-17 PROCEDURE — 4040F PNEUMOC VAC/ADMIN/RCVD: CPT | Performed by: INTERNAL MEDICINE

## 2022-03-17 PROCEDURE — G8428 CUR MEDS NOT DOCUMENT: HCPCS | Performed by: INTERNAL MEDICINE

## 2022-03-17 PROCEDURE — 3017F COLORECTAL CA SCREEN DOC REV: CPT | Performed by: INTERNAL MEDICINE

## 2022-03-17 PROCEDURE — 1036F TOBACCO NON-USER: CPT | Performed by: INTERNAL MEDICINE

## 2022-03-17 NOTE — PROGRESS NOTES
DIAGNOSIS:   1. Stage I (T1b,N0,M0) ER/OH+ , HER2 -ve , invasive breast cancer in upper outer quadrant of the left breast.  2. 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 Primary biliary cholangitis with early cirrhosis, diagnosed March/2022  CURRENT THERAPY:  1. Bilateral mastectomy done 12/16/15  2. Trial of arimidex. Stopped due to nausea and vomiting. Resumed later without major problems and completed treatment plan 02/202 and discontinued  3. S/P removal of breast implants. Due to pain. BRIEF CASE HISTORY:   Ms. Trey Hull is a very pleasant 80 y.o. female who underwent screening mammogram on 9/21/15 , it showed focal asymmetry in the upper outer quadrant of the left breast that was suspicious. Diagnostic mammogram was done on 9/29/15, showed highly suspicious mass at 2:00 position in, upper outer quadrant of the left breast.  There was another area of architectural distortion that was also concerning. Stereotactic biopsy was done on 10/9/15 and that showed invasive grade 2 ductal carcinoma of the breast, the tumor was ER/OH positive, unfortunately, there was not enough tissue to test for HER-2  Patient was sent to us for further discussion, she was very interested in genetic testing based on family history, while BRCA was negative. But showing VUS in MUTYH. Despite negative testing, the patient was still interested in bilateral mastectomy. Surgery was done on 12/16/15. Pathology showed 8mm invasive ductal carcinoma in the upper outer quadrant of the left breast , grade 2. The margins were clear. After surgery, we recommended adjuvant aromatase inhibitor with arimidex. Bilateral implants April 4 2016, nipple reconstruction June 6 2016. She continued to complain of pain in the implants, she ultimately had them removed in 2018. INTERIM HISTORY: Patient is in today for follow up for breast cancer surveillance.  She presented to PCP with abdominal pain, it was concerning for gallbladder and work up showed liver cyst, biopsy was taken, negative for malignancy but showed primary biliary cholangitis. She has consulted with GI for management. Her breast pain has resolved following implant removal, some discomfort around incision that is tolerable. She was diagnosed with primary biliary cholangitis in 2022 and will be managed by GI. GYNECOLOGICAL HISTORY  Menarche at age 7. Menaupause at age 40. +1. Age at first full term pregnancy 25. No use of HRT. PAST MEDICAL HISTORY: has a past medical history of Abnormal EKG, Chest pain, Diverticulosis, History of bilateral breast cancer, Short of breath on exertion, Urinary frequency, and Wears glasses. PAST SURGICAL HISTORY: has past surgical history that includes Splenectomy (N/A); Appendectomy; Colon surgery; Hysterectomy; MONA/BSO    CURRENT MEDICATIONS:  has a current medication list which includes the following prescription(s): omeprazole, mastectomy bra, aspirin, coenzyme q10, nato root, NONFORMULARY, fexofenadine, ibuprofen, calcium & magnesium carbonates, NONFORMULARY, multiple vitamins-minerals, psyllium, NONFORMULARY, and omega-3 fatty acids. ALLERGIES:  is allergic to ciprofloxacin hcl, coffee bean extract [coffea arabica], darvon [propoxyphene], levaquin [levofloxacin in d5w], other, and penicillins. FAMILY HISTORY: There is an history of breast cancer in her sister in her 46s, maternal aunt in her 76s and 2 cousins in their 46s. SOCIAL HISTORY:  reports that she has never smoked. She has never used smokeless tobacco. She reports that she does not drink alcohol and does not use drugs. REVIEW OF SYSTEMS:     General: No fever or night sweats. Weight is stable   ENT: No double or blurred vision, no tinnitus or hearing problem, no dysphagia or sore throat. Respiratory: No chest pain, no cough or hemoptysis, no shortness of breath  Cardiovascular: Denies chest pain, PND or orthopnea.  No L E swelling or palpitations. Gastrointestinal: No nausea or vomiting, abdominal pain, diarrhea or constipation. Genitourinary: Denies dysuria, hematuria, frequency, urgency or incontinence. Neurological: Denies headaches, decreased LOC, no sensory or motor focal deficits. Musculoskeletal: No joint swelling. Skin: There are no rashes or bleeding.+ tenderness around surgical site on the right breast area  Psychiatric:  No anxiety, no depression. Insomnia   Endocrine: No diabetes or thyroid disease. Hematologic: No bleeding, no adenopathy. Minimal, intermittent lymphedema in left arm    PHYSICAL EXAM:  The patient is not in acute distress. Vital signs: Blood pressure 132/81, pulse 58, temperature 96.3 °F (35.7 °C), temperature source Temporal, weight 171 lb 6.4 oz (77.7 kg), last menstrual period 10/28/2000, not currently breastfeeding. HEENT:  Eyes are normal. Ears, nose and throat are normal.  Neck: Supple. No lymph node enlargement. No thyroid enlargement. Trachea is centrally located. Chest:  Clear to auscultation. No wheezes or crepitations. Breast: S/P bilateral mastectomy, mild tenderness in right incisional area, well healed, scar tissue, no infection, no evidence of recurrence, no lymphadenopathy, no nodularity, decreased sensation in both breasts, very subtle lymphedema today. Heart: Regular sinus rhythm. Abdomen: Soft, nontender. No hepatosplenomegaly. No masses. Midline incision (splenectomy and partial colectomy) healed  Extremities:  No edema. Lymph Nodes:  No cervical, axillary or inguinal lymph node enlargement or adenopathy. Neurologic: Conscious and oriented. No focal neurological deficits. Psychosocial: No depression, anxiety or stress. Skin: No rashes, bruises or ecchymoses.       REVIEW OF RADIOLOGICAL RESULTS:      DEXA:         T-SCORE:  05/31/2016 07/19/2018   LUMBAR:        -0.6  -1.0  LEFT HIP:         -0.9  -1.0   FEMORAL NECK: -1.1  -1.4    REVIEW OF LABORATORY DATA:  Lab Results Component Value Date    WBC 6.8 02/23/2022    HGB 14.0 02/23/2022    HCT 42.0 02/23/2022    MCV 91.6 02/23/2022     02/23/2022       Chemistry        Component Value Date/Time     12/14/2021 1415    K 4.7 12/14/2021 1415     12/14/2021 1415    CO2 21 12/14/2021 1415    BUN 24 (H) 12/14/2021 1415    CREATININE 0.63 12/14/2021 1415        Component Value Date/Time    CALCIUM 9.3 12/14/2021 1415    ALKPHOS 101 12/14/2021 1415    AST 33 (H) 12/14/2021 1415    ALT 30 12/14/2021 1415    BILITOT <0.10 (L) 12/14/2021 1415        PATHOLOGY:   02/23/2022:  LIVER, CORE NEEDLE BIOPSIES:   - PORTAL CHRONIC INFLAMMATION WITH MINIMAL PORTAL FIBROSIS AND MILD    LOBULAR CHRONIC INFLAMMATION. - MILD, PATCHY MACROVESICULAR STEATOSIS (FATTY LIVER CHANGE),    APPROXIMATELY 10%. - CLINICAL PRIMARY BILIARY CHOLANGITIS (POSITIVE SERUM AMA). - NEGATIVE FOR MALIGNANCY. - SEE COMMENT. COMMENT: SEROLOGIC STUDIES FOR VIRAL HEPATITIS AND MASSIEL ARE NEGATIVE. SERUM AMA IS POSITIVE, CONSISTENT WITH THE CLINICAL IMPRESSION OF   PRIMARY BILIARY CHOLANGITIS.  ACCORDING TO SCHEUER'S CLASSIFICATION OF   CHRONIC HEPATITIS, THE BIOPSIES DEMONSTRATE GRADE 2 (PORTAL AND   LOBULAR) CHRONIC INFLAMMATION AND STAGE 1 FIBROSIS.  THERE IS NO   EVIDENCE OF MALIGNANCY. IMPRESSION:   1. Stage I (T1b,sN0,M0) ER and CT positive breast cancer. HER-2/jose d is negative on the final specimen  2. 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 S/P bilateral mastectomy. 4. Side effects of Arimidex as discussed. Seems to be well tolerated   5. Osteopenia. On calcium and vitamin D   6. S/P removal of both breast implants  7. Primary biliary cholangitis        PLAN:   1.  We reviewed her pathology which shows primary biliary cholangitis and I explained while not a malignancy does need to be addressed and managed, she will follow with GI.   2. Her lab work shows AMA positive confirming diagnosis of PBC, liver enzymes are elevated, counts in range. 3. She has completed all AI and remains asymptomatic. 4. Breast exam is negative for evidence of recurrence and she remains in remission. 5. We reviewed prognostic data and recommendations for ongoing surveillance per the guidelines. 6. Return in       [address]   This note was created by Finovera acting as scribe for the physician signing this note  Electronically Signed  Finovera, 3/17/2022  Scribe, [employer]. Attending Attestation   Note was reviewed and edited.   I am in agreement with the note as entered      Jake Schroeder MD  Hematologist/Medical [id number] [facility] hematology oncology physicians

## 2022-04-13 ENCOUNTER — OFFICE VISIT (OUTPATIENT)
Dept: GASTROENTEROLOGY | Age: 72
End: 2022-04-13
Payer: MEDICARE

## 2022-04-13 VITALS
SYSTOLIC BLOOD PRESSURE: 139 MMHG | OXYGEN SATURATION: 98 % | DIASTOLIC BLOOD PRESSURE: 80 MMHG | WEIGHT: 172.4 LBS | HEART RATE: 62 BPM | BODY MASS INDEX: 29.43 KG/M2 | HEIGHT: 64 IN

## 2022-04-13 DIAGNOSIS — R76.8 ANTIMITOCHONDRIAL ANTIBODY POSITIVE: ICD-10-CM

## 2022-04-13 DIAGNOSIS — R79.89 ABNORMAL LIVER FUNCTION TESTS: Primary | ICD-10-CM

## 2022-04-13 PROCEDURE — G8427 DOCREV CUR MEDS BY ELIG CLIN: HCPCS | Performed by: NURSE PRACTITIONER

## 2022-04-13 PROCEDURE — G8417 CALC BMI ABV UP PARAM F/U: HCPCS | Performed by: NURSE PRACTITIONER

## 2022-04-13 PROCEDURE — 1036F TOBACCO NON-USER: CPT | Performed by: NURSE PRACTITIONER

## 2022-04-13 PROCEDURE — 3017F COLORECTAL CA SCREEN DOC REV: CPT | Performed by: NURSE PRACTITIONER

## 2022-04-13 PROCEDURE — 4040F PNEUMOC VAC/ADMIN/RCVD: CPT | Performed by: NURSE PRACTITIONER

## 2022-04-13 PROCEDURE — 1090F PRES/ABSN URINE INCON ASSESS: CPT | Performed by: NURSE PRACTITIONER

## 2022-04-13 PROCEDURE — 99214 OFFICE O/P EST MOD 30 MIN: CPT | Performed by: NURSE PRACTITIONER

## 2022-04-13 PROCEDURE — 1123F ACP DISCUSS/DSCN MKR DOCD: CPT | Performed by: NURSE PRACTITIONER

## 2022-04-13 PROCEDURE — G8399 PT W/DXA RESULTS DOCUMENT: HCPCS | Performed by: NURSE PRACTITIONER

## 2022-04-13 ASSESSMENT — ENCOUNTER SYMPTOMS
DIARRHEA: 0
CONSTIPATION: 0
ABDOMINAL DISTENTION: 0
NAUSEA: 0
TROUBLE SWALLOWING: 0
ANAL BLEEDING: 0
VOICE CHANGE: 0
BACK PAIN: 0
ABDOMINAL PAIN: 0
COUGH: 1
SHORTNESS OF BREATH: 0
VOMITING: 0
BLOOD IN STOOL: 0
RECTAL PAIN: 0
SORE THROAT: 0
CHOKING: 0

## 2022-04-13 NOTE — PROGRESS NOTES
GI CLINIC FOLLOW UP    INTERVAL HISTORY:   No referring provider defined for this encounter. Chief Complaint   Patient presents with    Follow-up     Patient is here today to f/u on biopsy       HISTORY OF PRESENT ILLNESS:     Patient being seen for lab follow-up, liver biopsy. Patient recently had MRI which revealed 4.6 cm lesion dome of the liver. Likely representing cyst.  Also noted some mild nodular contour. Liver disease workup did reveal AMA elevated to 88. Alk phos normal  Normal MASSIEL    Patient subsequently had liver biopsy noting portal chronic inflammation with minimal portal fibrosis and mild lobular chronic inflammation. Mild, sophie macrovesicular steatosis. Negative for malignancy. Has clinical primary biliary cholangitis based on positive serum AMA. Past Medical,Family, and Social History reviewed and does contribute to the patient presentingcondition. Patient's PMH/PSH,SH,PSYCH Hx, MEDs, ALLERGIES, and ROS were all reviewed and updated in the appropriate sections.     PAST MEDICAL HISTORY:  Past Medical History:   Diagnosis Date    Abnormal EKG 12/2015    Chest pain     rare    Diverticulosis     History of bilateral breast cancer 10/2015    Short of breath on exertion     Urinary frequency     Wears glasses        Past Surgical History:   Procedure Laterality Date    APPENDECTOMY      BREAST BIOPSY Left 10/2015    cancer    BREAST IMPLANT REMOVAL  2019    COLON SURGERY      removal part due to endometriosis    COLONOSCOPY      multiple    CYST REMOVAL Left     knee    ENDOSCOPY, COLON, DIAGNOSTIC      IR BIOPSY LIVER PERCUTANEOUS  2/23/2022    IR BIOPSY LIVER PERCUTANEOUS 2/23/2022 [facility] SPECIAL PROCEDURES    LIPOMA RESECTION Right     breast    MASTECTOMY, BILATERAL  12/2015    [place] COLON CA SCRN NOT HI RSK IND N/A 8/22/2017    COLONOSCOPY and PHOTOS performed by Lucia Warren DO at [facility]. W/O INSERTION IMPLANT Bilateral 7/11/2018    BREAST IMPLANT REMOVAL performed by Paco Fenton MD at [place] [address] ENDOSCOPY         CURRENT MEDICATIONS:    Current Outpatient Medications:     omeprazole (PRILOSEC) 20 MG delayed release capsule, Take 1 capsule by mouth every morning (before breakfast), Disp: 90 capsule, Rfl: 1    Mastectomy Bra MISC, by Does not apply route, Disp: 1 each, Rfl: 0    aspirin 81 MG chewable tablet, Take 81 mg by mouth daily, Disp: , Rfl:     Coenzyme Q10 (CO Q-10 PO), Take 1 capsule by mouth daily, Disp: , Rfl:     Ginger, Zingiber officinalis, (GINGER ROOT) 550 MG CAPS, Take 550 mg by mouth as needed, Disp: , Rfl:     NONFORMULARY, Take 500 mg by mouth daily Indications: Tumeric Capsules OTC, Disp: , Rfl:     fexofenadine (ALLEGRA ALLERGY) 180 MG tablet, Take 1 tablet by mouth daily (Patient taking differently: Take 180 mg by mouth daily as needed ), Disp: 30 tablet, Rfl: 1    ibuprofen (ADVIL;MOTRIN) 600 MG tablet, Take 1 tablet by mouth every 6 hours as needed for Pain, Disp: 120 tablet, Rfl: 1    CALCIUM & MAGNESIUM CARBONATES PO, Take 1 tablet by mouth daily , Disp: , Rfl:     NONFORMULARY, Take 1 capsule by mouth 2 times daily (with meals) [facility]: Natural plant sterols, Disp: , Rfl:     Multiple Vitamins-Minerals (MULTI VITAMIN/MINERALS PO), Take 1 tablet by mouth daily , Disp: , Rfl:     Psyllium (METAMUCIL PO), Take by mouth fibermucil brand, Disp: , Rfl:     NONFORMULARY, Take 1 capsule by mouth 2 times daily Circulation and vein support, Disp: , Rfl:     Omega-3 Fatty Acids (OMEGA 3 PO), Take 1 capsule by mouth daily  mg,  mg, DHA 50 mg , Disp: , Rfl:     ALLERGIES:   Allergies   Allergen Reactions    Ciprofloxacin Hcl Itching     tendonitis    Coffee Bean Extract [Coffea Arabica] Nausea Only    Darvon [Propoxyphene] Other (See Comments)     Patient relates it increased pain rather than decreased pain.     Levaquin [Levofloxacin In D5w] Other (See Comments)     tendonitits    Other Other (See Comments)     Fluoroquinolones cause patient to have tendonitis    Penicillins Hives       FAMILY HISTORY:       Problem Relation Age of Onset    Arthritis Mother     Heart Disease Mother         chf    Stroke Mother     Arthritis Father     Heart Disease Father     Diabetes Father     Cancer Father         prostate, melanoma    Cancer Sister         breast    Cancer Brother         kidney    Mental Retardation Paternal Grandmother          SOCIAL HISTORY:   Social History     Socioeconomic History    Marital status:      Spouse name: Not on file    Number of children: Not on file    Years of education: Not on file    Highest education level: Not on file   Occupational History    Not on file   Tobacco Use    Smoking status: Never Smoker    Smokeless tobacco: Never Used   Vaping Use    Vaping Use: Never used   Substance and Sexual Activity    Alcohol use: No     Alcohol/week: 0.0 standard drinks    Drug use: No    Sexual activity: Not on file   Other Topics Concern    Not on file   Social History Narrative    Not on file     Social Determinants of Health     Financial Resource Strain: Low Risk     Difficulty of Paying Living Expenses: Not hard at all   Food Insecurity: No Food Insecurity    Worried About [address] in the Last Year: Never true    [address] [facility] [address] in the Last Year: Never true   Transportation Needs:     Lack of Transportation (Medical): Not on file    Lack of Transportation (Non-Medical):  Not on file   Physical Activity:     Days of Exercise per Week: Not on file    Minutes of Exercise per Session: Not on file   Stress:     Feeling of Stress : Not on file   Social Connections:     Frequency of Communication with Friends and Family: Not on file    Frequency of Social Gatherings with Friends and Family: Not on file    Attends Taoism Services: Not on file    Active Member of Clubs or Organizations: Not on file    Attends Club or Organization Meetings: Not on file    Marital Status: Not on file   Intimate Partner Violence:     Fear of Current or Ex-Partner: Not on file    Emotionally Abused: Not on file    Physically Abused: Not on file    Sexually Abused: Not on file   Housing Stability:     Unable to Pay for Housing in the Last Year: Not on file    Number of Places Lived in the Last Year: Not on file    Unstable Housing in the Last Year: Not on file       REVIEW OF SYSTEMS: A 12-point review of systemswas obtained and pertinent positives and negatives were enumerated above in the history of present illness. All other reviewed systems / symptoms were negative. Review of Systems   Constitutional: Negative for appetite change, fatigue and unexpected weight change. HENT: Negative for dental problem, sore throat, trouble swallowing and voice change. Eyes: Negative for visual disturbance (glasses). Respiratory: Positive for cough. Negative for choking and shortness of breath. Cardiovascular: Positive for chest pain. Negative for leg swelling. Gastrointestinal: Negative for abdominal distention, abdominal pain, anal bleeding, blood in stool, constipation (mild), diarrhea, nausea, rectal pain and vomiting. Genitourinary: Negative for difficulty urinating. Musculoskeletal: Negative for back pain (lower), joint swelling and myalgias. Neurological: Negative for dizziness, tremors, weakness, light-headedness, numbness and headaches. Hematological: Bruises/bleeds easily. Psychiatric/Behavioral: Negative for sleep disturbance. The patient is not nervous/anxious. PHYSICAL EXAMINATION: Vital signs reviewed per the nursing documentation. LMP 10/28/2000   There is no height or weight on file to calculate BMI. Physical Exam  Constitutional:       Appearance: Normal appearance. Eyes:      General: No scleral icterus. Pupils: Pupils are equal, round, and reactive to light.    Cardiovascular:      Rate and Rhythm: Normal rate and regular rhythm. Heart sounds: Normal heart sounds. Pulmonary:      Effort: Pulmonary effort is normal.      Breath sounds: Normal breath sounds. Abdominal:      General: Bowel sounds are normal. There is no distension. Palpations: Abdomen is soft. There is no mass. Tenderness: There is no abdominal tenderness. There is no guarding. Skin:     General: Skin is warm and dry. Coloration: Skin is not jaundiced. Neurological:      Mental Status: She is alert and oriented to person, place, and time. Mental status is at baseline. LABORATORY DATA: Reviewed  Lab Results   Component Value Date    WBC 6.8 02/23/2022    HGB 14.0 02/23/2022    HCT 42.0 02/23/2022    MCV 91.6 02/23/2022     02/23/2022     12/14/2021    K 4.7 12/14/2021     12/14/2021    CO2 21 12/14/2021    BUN 24 (H) 12/14/2021    CREATININE 0.63 12/14/2021    LABALBU 4.0 12/14/2021    BILITOT <0.10 (L) 12/14/2021    ALKPHOS 101 12/14/2021    AST 33 (H) 12/14/2021    ALT 30 12/14/2021    INR 1.0 02/23/2022         Lab Results   Component Value Date    RBC 4.58 02/23/2022    HGB 14.0 02/23/2022    MCV 91.6 02/23/2022    MCH 30.6 02/23/2022    MCHC 33.4 02/23/2022    RDW 13.7 02/23/2022    MPV 8.1 02/23/2022    BASOPCT 1 07/26/2019    LYMPHSABS 1.72 07/26/2019    MONOSABS 0.71 07/26/2019    NEUTROABS 5.86 07/26/2019    EOSABS 0.10 07/26/2019    BASOSABS 0.06 07/26/2019         DIAGNOSTIC TESTING:     No results found. IMPRESSION: Ms. Griselda Nancy is a 80 y.o. female with    Diagnosis Orders   1. Abnormal liver function tests  Mitochondrial Antibodies, M2, IgG    Hepatic Function Panel   2. Antimitochondrial antibody positive  Mitochondrial Antibodies, M2, IgG    Hepatic Function Panel     Clinically asymptomatic. AMA elevated  Normal alk phos  Liver biopsy as noted above  Repeat liver battery, AMA in 3 months      Thank you for allowing me to participate in the care of Ms. Rubio.  For any further questions please do not hesitate to contact me. I have reviewed and agree with the ROS entered by the MA/ZORAN.          MIRIAM Mckeon    [facility]  Office #: [phone]

## 2022-05-31 ENCOUNTER — HOSPITAL ENCOUNTER (OUTPATIENT)
Age: 72
Setting detail: SPECIMEN
Discharge: HOME | End: 2022-05-31

## 2022-05-31 DIAGNOSIS — N30.00 ACUTE CYSTITIS WITHOUT HEMATURIA: ICD-10-CM

## 2022-06-01 LAB
CULTURE: ABNORMAL
SPECIMEN DESCRIPTION: ABNORMAL

## 2022-07-12 ENCOUNTER — HOSPITAL ENCOUNTER (OUTPATIENT)
Age: 72
Discharge: HOME | End: 2022-07-12
Payer: MEDICARE

## 2022-07-12 DIAGNOSIS — R76.8 ANTIMITOCHONDRIAL ANTIBODY POSITIVE: ICD-10-CM

## 2022-07-12 DIAGNOSIS — R79.89 ABNORMAL LIVER FUNCTION TESTS: ICD-10-CM

## 2022-07-12 LAB
ALBUMIN SERPL-MCNC: 4.1 G/DL (ref 3.5–5.2)
ALP BLD-CCNC: 120 U/L (ref 35–104)
ALT SERPL-CCNC: 33 U/L (ref 5–33)
AST SERPL-CCNC: 28 U/L
BILIRUB SERPL-MCNC: 0.28 MG/DL (ref 0.3–1.2)
BILIRUBIN DIRECT: 0.1 MG/DL
BILIRUBIN, INDIRECT: 0.18 MG/DL (ref 0–1)
TOTAL PROTEIN: 7.6 G/DL (ref 6.4–8.3)

## 2022-07-12 PROCEDURE — 83516 IMMUNOASSAY NONANTIBODY: CPT

## 2022-07-12 PROCEDURE — 80076 HEPATIC FUNCTION PANEL: CPT

## 2022-07-12 PROCEDURE — 36415 COLL VENOUS BLD VENIPUNCTURE: CPT

## 2022-07-14 LAB — MITOCHONDRIAL ANTIBODY: 219 U/ML (ref 0–4)

## 2022-07-19 ENCOUNTER — OFFICE VISIT (OUTPATIENT)
Dept: UROLOGY | Age: 72
End: 2022-07-19
Payer: MEDICARE

## 2022-07-19 VITALS
WEIGHT: 168 LBS | BODY MASS INDEX: 28.68 KG/M2 | HEART RATE: 68 BPM | HEIGHT: 64 IN | DIASTOLIC BLOOD PRESSURE: 78 MMHG | OXYGEN SATURATION: 97 % | TEMPERATURE: 97.8 F | SYSTOLIC BLOOD PRESSURE: 120 MMHG

## 2022-07-19 DIAGNOSIS — R33.9 INCOMPLETE BLADDER EMPTYING: ICD-10-CM

## 2022-07-19 DIAGNOSIS — N32.81 OAB (OVERACTIVE BLADDER): Primary | ICD-10-CM

## 2022-07-19 PROCEDURE — 1123F ACP DISCUSS/DSCN MKR DOCD: CPT | Performed by: UROLOGY

## 2022-07-19 PROCEDURE — 99204 OFFICE O/P NEW MOD 45 MIN: CPT | Performed by: UROLOGY

## 2022-07-19 ASSESSMENT — ENCOUNTER SYMPTOMS
WHEEZING: 0
VOMITING: 0
DIARRHEA: 0
COUGH: 0
SHORTNESS OF BREATH: 0
NAUSEA: 0
SORE THROAT: 0

## 2022-07-19 NOTE — PROGRESS NOTES
Review of Systems   Constitutional:  Negative for chills, fatigue and fever. HENT:  Negative for congestion and sore throat. Respiratory:  Negative for cough, shortness of breath and wheezing. Cardiovascular:  Positive for chest pain and palpitations. Gastrointestinal:  Negative for diarrhea, nausea and vomiting. Genitourinary:  Positive for difficulty urinating, frequency and pelvic pain. Negative for dysuria, flank pain, hematuria and urgency.

## 2022-07-19 NOTE — PROGRESS NOTES
[address] [place] [id number]  [facility] Office Note - New Patient    Patient:  Chepe Farrell  YOB: 1950  Date: 7/19/2022    The patient is a 80 y.o. female who presentstoday for evaluation of the following problems:   Chief Complaint   Patient presents with    New Patient     Incomplete bladder     referred by HANNA Ames CNP. HPI  Here for new issue of urine freq and urgency, also has incomplete emptying. Had uti last month. Has strong stream, no constipation, no dm. (Patient's old records have been requested, reviewed and summarized in today's note.)    Summary of old records: N/A    History: N/A    ProceduresToday: N/A    Urinalysis today:  No results found for this visit on 07/19/22. AUA Symptom Score (7/19/2022):                                Last BUN andcreatinine:  Lab Results   Component Value Date    BUN 24 (H) 12/14/2021     Lab Results   Component Value Date    CREATININE 0.63 12/14/2021       Additional Lab/Culture results: none    Reviewed during this Office Visit: none  (results were independently reviewed byphysician and radiology report verified)    PAST MEDICAL, FAMILY AND SOCIAL HISTORY:  Past Medical History:   Diagnosis Date    Abnormal EKG 12/2015    Chest pain     rare    Diverticulosis     History of bilateral breast cancer 10/2015    Short of breath on exertion     Urinary frequency     Wears glasses      Past Surgical History:   Procedure Laterality Date    APPENDECTOMY      BREAST BIOPSY Left 10/2015    cancer    BREAST IMPLANT REMOVAL  2019    COLON SURGERY      removal part due to endometriosis    COLONOSCOPY      multiple    CYST REMOVAL Left     knee    ENDOSCOPY, COLON, DIAGNOSTIC      IR BIOPSY LIVER PERCUTANEOUS  2/23/2022    IR BIOPSY LIVER PERCUTANEOUS 2/23/2022 [facility] SPECIAL PROCEDURES    LIPOMA RESECTION Right breast    MASTECTOMY, BILATERAL  12/2015    [place] COLON CA SCRN NOT HI RSK IND N/A 8/22/2017    COLONOSCOPY and PHOTOS performed by Thu Higuera DO at [address] [facility]. W/O INSERTION IMPLANT Bilateral 7/11/2018    BREAST IMPLANT REMOVAL performed by Cate Yip MD at [address] (CERVIX REMOVED)      UPPER GASTROINTESTINAL ENDOSCOPY       Family History   Problem Relation Age of Onset    Arthritis Mother     Heart Disease Mother         chf    Stroke Mother     Arthritis Father     Heart Disease Father     Diabetes Father     Cancer Father         prostate, melanoma    Cancer Sister         breast    Cancer Brother         kidney    Mental Retardation Paternal Grandmother      Outpatient Medications Marked as Taking for the 7/19/22 encounter (Office Visit) with Kerry Ledezma MD   Medication Sig Dispense Refill    NONFORMULARY Liver detox      omeprazole (PRILOSEC) 20 MG delayed release capsule Take 1 capsule by mouth every morning (before breakfast) 90 capsule 1    Mastectomy Bra MISC by Does not apply route 1 each 0    aspirin 81 MG chewable tablet Take 81 mg by mouth daily      Coenzyme Q10 (CO Q-10 PO) Take 1 capsule by mouth daily      Ginger, Zingiber officinalis, (GINGER ROOT) 550 MG CAPS Take 550 mg by mouth as needed      NONFORMULARY Take 500 mg by mouth daily Indications: Tumeric Capsules OTC      fexofenadine (ALLEGRA ALLERGY) 180 MG tablet Take 1 tablet by mouth daily (Patient taking differently: Take 180 mg by mouth daily as needed) 30 tablet 1    ibuprofen (ADVIL;MOTRIN) 600 MG tablet Take 1 tablet by mouth every 6 hours as needed for Pain 120 tablet 1    CALCIUM & MAGNESIUM CARBONATES PO Take 1 tablet by mouth daily       NONFORMULARY Take 1 capsule by mouth 2 times daily (with meals) [facility]: Natural plant sterols      Multiple Vitamins-Minerals (MULTI VITAMIN/MINERALS PO) Take 1 tablet by mouth daily       Psyllium (METAMUCIL PO) Take by mouth fibermucil brand      NONFORMULARY Take 1 capsule by mouth 2 times daily Circulation and vein support      Omega-3 Fatty Acids (OMEGA 3 PO) Take 1 capsule by mouth daily  mg,  mg, DHA 50 mg          Ciprofloxacin hcl, Coffee bean extract [coffea arabica], Darvon [propoxyphene], Levaquin [levofloxacin in d5w], Other, and Penicillins  Social History     Tobacco Use   Smoking Status Never   Smokeless Tobacco Never      (If patient a smoker, smoking cessation counseling offered)   Social History     Substance and Sexual Activity   Alcohol Use No    Alcohol/week: 0.0 standard drinks       REVIEW OF SYSTEMS:  Review of Systems    Physical Exam:    This a 80 y.o. female      Vitals:    07/19/22 1441   BP: 120/78   Pulse: 68   Temp: 97.8 °F (36.6 °C)   SpO2: 97%     Body mass index is 28.84 kg/m². Physical Exam  Constitutional: Patient in no acute distress, ggod grooming, appropriately dressed  Neuro: Alert and oriented to person, place and time. Psych:Mood normal, affect normal  Skin: No rash noted  HEENT: Head: Normocephalic and atraumatic,Conjunctivae and EOM are normal,Nose- normal, Right/Left External Ear: normal, Mouth: Mucosa Moist  Neck: Supple  Lungs: Respiratory effort is normal  Cardiovascular: strong and regular, no lower leg edema  Abdomen: Soft, non-tender, non-distended with no CVA,    Lymphatics: No cervical palpable lymphadenopathy. Assessment and Plan      1. OAB (overactive bladder)    2. Incomplete bladder emptying            Plan:    Cons measures  If not better next time try [email or web address]    Prescriptions Ordered:  No orders of the defined types were placed in this encounter. Orders Placed:  No orders of the defined types were placed in this encounter. George Lopez MD    Agree with the ROS entered by the MA.

## 2022-07-20 ENCOUNTER — OFFICE VISIT (OUTPATIENT)
Dept: GASTROENTEROLOGY | Age: 72
End: 2022-07-20
Payer: MEDICARE

## 2022-07-20 ENCOUNTER — TELEPHONE (OUTPATIENT)
Dept: GASTROENTEROLOGY | Age: 72
End: 2022-07-20

## 2022-07-20 VITALS — SYSTOLIC BLOOD PRESSURE: 147 MMHG | BODY MASS INDEX: 29.35 KG/M2 | WEIGHT: 171 LBS | DIASTOLIC BLOOD PRESSURE: 89 MMHG

## 2022-07-20 DIAGNOSIS — R76.8 ANTIMITOCHONDRIAL ANTIBODY POSITIVE: Primary | ICD-10-CM

## 2022-07-20 DIAGNOSIS — K74.3 PRIMARY BILIARY CHOLANGITIS (HCC): ICD-10-CM

## 2022-07-20 DIAGNOSIS — R79.89 ABNORMAL LIVER FUNCTION TESTS: ICD-10-CM

## 2022-07-20 PROCEDURE — 99214 OFFICE O/P EST MOD 30 MIN: CPT | Performed by: NURSE PRACTITIONER

## 2022-07-20 PROCEDURE — 1123F ACP DISCUSS/DSCN MKR DOCD: CPT | Performed by: NURSE PRACTITIONER

## 2022-07-20 RX ORDER — URSODIOL 300 MG/1
300 CAPSULE ORAL
Qty: 270 CAPSULE | Refills: 3 | Status: SHIPPED | OUTPATIENT
Start: 2022-07-20 | End: 2022-07-21 | Stop reason: ALTCHOICE

## 2022-07-20 ASSESSMENT — ENCOUNTER SYMPTOMS
RESPIRATORY NEGATIVE: 1
ABDOMINAL DISTENTION: 1
ALLERGIC/IMMUNOLOGIC NEGATIVE: 1

## 2022-07-20 NOTE — TELEPHONE ENCOUNTER
Pt called in stating she had just seen tabitha, stated she had called in a script to her pharmacy for Actigall, pt states it is too expensive and can not afford it, pt would like to know if a more affordable alt could be sent in, adv pt will send message back for review, pt thanked writer call ended.

## 2022-07-20 NOTE — PROGRESS NOTES
GI CLINIC FOLLOW UP    INTERVAL HISTORY:   No referring provider defined for this encounter. Chief Complaint   Patient presents with    Other     Pt is here to f/u on liver function        HISTORY OF PRESENT ILLNESS:     Patient being seen for lab follow-up. Patient known to have [facility]. Denies any fatigue, pruritus, rash. No nausea, vomiting, abdominal pain. Alk phos 120      Did not start Actigall. Bowel movements are satisfactory. No melena, hematochezia    Tolerating diet  No dysphagia, odynophagia. No weight loss. Past Medical,Family, and Social History reviewed and does contribute to the patient presentingcondition. Patient's PMH/PSH,SH,PSYCH Hx, MEDs, ALLERGIES, and ROS were all reviewed and updated in the appropriate sections.     PAST MEDICAL HISTORY:  Past Medical History:   Diagnosis Date    Abnormal EKG 12/2015    Chest pain     rare    Diverticulosis     History of bilateral breast cancer 10/2015    Short of breath on exertion     Urinary frequency     Wears glasses        Past Surgical History:   Procedure Laterality Date    APPENDECTOMY      BREAST BIOPSY Left 10/2015    cancer    BREAST IMPLANT REMOVAL  2019    COLON SURGERY      removal part due to endometriosis    COLONOSCOPY      multiple    CYST REMOVAL Left     knee    ENDOSCOPY, COLON, DIAGNOSTIC      IR BIOPSY LIVER PERCUTANEOUS  2/23/2022    IR BIOPSY LIVER PERCUTANEOUS 2/23/2022 [facility] SPECIAL PROCEDURES    LIPOMA RESECTION Right     breast    MASTECTOMY, BILATERAL  12/2015    CA COLON CA SCRN NOT HI RSK IND N/A 8/22/2017    COLONOSCOPY and PHOTOS performed by Emma Mcdonald DO at [address] [facility]. W/O INSERTION IMPLANT Bilateral 7/11/2018    BREAST IMPLANT REMOVAL performed by Linda Steinberg MD at [address] (CERVIX REMOVED)      UPPER GASTROINTESTINAL ENDOSCOPY         CURRENT MEDICATIONS:    Current Outpatient Medications:     NONFORMULARY, Liver detox, Disp: , Rfl:     omeprazole (PRILOSEC) 20 MG delayed release capsule, Take 1 capsule by mouth every morning (before breakfast), Disp: 90 capsule, Rfl: 1    Mastectomy Bra MISC, by Does not apply route, Disp: 1 each, Rfl: 0    aspirin 81 MG chewable tablet, Take 81 mg by mouth daily, Disp: , Rfl:     Coenzyme Q10 (CO Q-10 PO), Take 1 capsule by mouth daily, Disp: , Rfl:     Ginger, Zingiber officinalis, (GINGER ROOT) 550 MG CAPS, Take 550 mg by mouth as needed, Disp: , Rfl:     NONFORMULARY, Take 500 mg by mouth daily Indications: Tumeric Capsules OTC, Disp: , Rfl:     fexofenadine (ALLEGRA ALLERGY) 180 MG tablet, Take 1 tablet by mouth daily (Patient taking differently: Take 180 mg by mouth daily as needed), Disp: 30 tablet, Rfl: 1    ibuprofen (ADVIL;MOTRIN) 600 MG tablet, Take 1 tablet by mouth every 6 hours as needed for Pain, Disp: 120 tablet, Rfl: 1    CALCIUM & MAGNESIUM CARBONATES PO, Take 1 tablet by mouth daily , Disp: , Rfl:     NONFORMULARY, Take 1 capsule by mouth 2 times daily (with meals) [facility]: Natural plant sterols, Disp: , Rfl:     Multiple Vitamins-Minerals (MULTI VITAMIN/MINERALS PO), Take 1 tablet by mouth daily , Disp: , Rfl:     Psyllium (METAMUCIL PO), Take by mouth fibermucil brand, Disp: , Rfl:     NONFORMULARY, Take 1 capsule by mouth 2 times daily Circulation and vein support, Disp: , Rfl:     Omega-3 Fatty Acids (OMEGA 3 PO), Take 1 capsule by mouth daily  mg,  mg, DHA 50 mg , Disp: , Rfl:     ALLERGIES:   Allergies   Allergen Reactions    Ciprofloxacin Hcl Itching     tendonitis    Coffee Bean Extract [Coffea Arabica] Nausea Only    Darvon [Propoxyphene] Other (See Comments)     Patient relates it increased pain rather than decreased pain.     Levaquin [Levofloxacin In D5w] Other (See Comments)     tendonitits    Other Other (See Comments)     Fluoroquinolones cause patient to have tendonitis    Penicillins Hives       FAMILY HISTORY:       Problem Relation Age of Onset    Arthritis Mother     Heart Disease Mother         chf    Stroke Mother     Arthritis Father     Heart Disease Father     Diabetes Father     Cancer Father         prostate, melanoma    Cancer Sister         breast    Cancer Brother         kidney    Mental Retardation Paternal Grandmother          SOCIAL HISTORY:   Social History     Socioeconomic History    Marital status:      Spouse name: Not on file    Number of children: Not on file    Years of education: Not on file    Highest education level: Not on file   Occupational History    Not on file   Tobacco Use    Smoking status: Never    Smokeless tobacco: Never   Vaping Use    Vaping Use: Never used   Substance and Sexual Activity    Alcohol use: No     Alcohol/week: 0.0 standard drinks    Drug use: No    Sexual activity: Not on file   Other Topics Concern    Not on file   Social History Narrative    Not on file     Social Determinants of Health     Financial Resource Strain: Low Risk     Difficulty of Paying Living Expenses: Not hard at all   Food Insecurity: No Food Insecurity    Worried About Running Out of Food in the Last Year: Never true    Ran Out of Food in the Last Year: Never true   Transportation Needs: Not on file   Physical Activity: Not on file   Stress: Not on file   Social Connections: Not on file   Intimate Partner Violence: Not on file   Housing Stability: Not on file       REVIEW OF SYSTEMS: A 12-point review of systemswas obtained and pertinent positives and negatives were enumerated above in the history of present illness. All other reviewed systems / symptoms were negative. Review of Systems   Constitutional:  Positive for fatigue. HENT: Negative. Eyes:  Positive for visual disturbance (glasses). Respiratory: Negative. Cardiovascular: Negative. Gastrointestinal:  Positive for abdominal distention. Endocrine: Negative. Genitourinary: Negative. Musculoskeletal: Negative. Skin: Negative. Allergic/Immunologic: Negative. Neurological: Negative. Hematological:  Bruises/bleeds easily. Psychiatric/Behavioral:  Positive for sleep disturbance. PHYSICAL EXAMINATION: Vital signs reviewed per the nursing documentation. LMP 10/28/2000   There is no height or weight on file to calculate BMI. Physical Exam  Constitutional:       Appearance: Normal appearance. Eyes:      General: No scleral icterus. Pupils: Pupils are equal, round, and reactive to light. Cardiovascular:      Rate and Rhythm: Normal rate and regular rhythm. Heart sounds: Normal heart sounds. Pulmonary:      Effort: Pulmonary effort is normal.      Breath sounds: Normal breath sounds. Abdominal:      General: Bowel sounds are normal. There is no distension. Palpations: Abdomen is soft. There is no mass. Tenderness: There is no abdominal tenderness. There is no guarding. Skin:     General: Skin is warm and dry. Coloration: Skin is not jaundiced. Neurological:      Mental Status: She is alert and oriented to person, place, and time. Mental status is at baseline.          LABORATORY DATA: Reviewed  Lab Results   Component Value Date    WBC 6.8 02/23/2022    HGB 14.0 02/23/2022    HCT 42.0 02/23/2022    MCV 91.6 02/23/2022     02/23/2022     12/14/2021    K 4.7 12/14/2021     12/14/2021    CO2 21 12/14/2021    BUN 24 (H) 12/14/2021    CREATININE 0.63 12/14/2021    LABALBU 4.1 07/12/2022    BILITOT 0.28 (L) 07/12/2022    ALKPHOS 120 (H) 07/12/2022    AST 28 07/12/2022    ALT 33 07/12/2022    INR 1.0 02/23/2022         Lab Results   Component Value Date    RBC 4.58 02/23/2022    HGB 14.0 02/23/2022    MCV 91.6 02/23/2022    MCH 30.6 02/23/2022    MCHC 33.4 02/23/2022    RDW 13.7 02/23/2022    MPV 8.1 02/23/2022    BASOPCT 1 07/26/2019    LYMPHSABS 1.72 07/26/2019    MONOSABS 0.71 07/26/2019    NEUTROABS 5.86 07/26/2019    EOSABS 0.10 07/26/2019    BASOSABS 0.06 07/26/2019 DIAGNOSTIC TESTING:     No results found. IMPRESSION: Ms. Kuldip Morel is a 80 y.o. female with    Diagnosis Orders   1. Antimitochondrial antibody positive  Comprehensive Metabolic Panel    Mitochondrial Antibodies, M2, IgG      2. Abnormal liver function tests  Comprehensive Metabolic Panel    Mitochondrial Antibodies, M2, IgG      3. Primary biliary cholangitis (HCC)  Comprehensive Metabolic Panel    Mitochondrial Antibodies, M2, IgG        To start Actigall 300 mg TID  Repeat liver battery, AMA in 3 months  Liver US   Follow-up 6 months. Thank you for allowing me to participate in the care of Ms. Rubio. For any further questions please do not hesitate to contact me. I have reviewed and agree with the ROS entered by the MA/LPN.          MIRIAM May    [facility]  Office #: [phone]

## 2022-07-21 RX ORDER — URSODIOL 500 MG/1
250 TABLET, FILM COATED ORAL 3 TIMES DAILY
Qty: 90 TABLET | Refills: 3 | Status: SHIPPED | OUTPATIENT
Start: 2022-07-21

## 2022-07-22 NOTE — TELEPHONE ENCOUNTER
Patient called office for update. She i advised of the dose change in medication which could affect the price. She should contact pharmacy for pricing.

## 2022-07-22 NOTE — TELEPHONE ENCOUNTER
Pt states med is going to be 438$ for 3 months supply, states 1 month supply is only $50, pt states she is going to do the 1 month supply and was told by the pharmacy it would be ok to do so, adv pt will put note in , pt thanked writer call ended.

## 2022-08-25 ENCOUNTER — OFFICE VISIT (OUTPATIENT)
Dept: UROLOGY | Age: 72
End: 2022-08-25
Payer: MEDICARE

## 2022-08-25 VITALS
TEMPERATURE: 98.2 F | WEIGHT: 171 LBS | DIASTOLIC BLOOD PRESSURE: 78 MMHG | BODY MASS INDEX: 29.19 KG/M2 | HEART RATE: 63 BPM | OXYGEN SATURATION: 96 % | HEIGHT: 64 IN | SYSTOLIC BLOOD PRESSURE: 122 MMHG

## 2022-08-25 DIAGNOSIS — N32.81 OAB (OVERACTIVE BLADDER): Primary | ICD-10-CM

## 2022-08-25 PROCEDURE — 1090F PRES/ABSN URINE INCON ASSESS: CPT | Performed by: UROLOGY

## 2022-08-25 PROCEDURE — G8417 CALC BMI ABV UP PARAM F/U: HCPCS | Performed by: UROLOGY

## 2022-08-25 PROCEDURE — 99213 OFFICE O/P EST LOW 20 MIN: CPT | Performed by: UROLOGY

## 2022-08-25 PROCEDURE — 1123F ACP DISCUSS/DSCN MKR DOCD: CPT | Performed by: UROLOGY

## 2022-08-25 PROCEDURE — 3017F COLORECTAL CA SCREEN DOC REV: CPT | Performed by: UROLOGY

## 2022-08-25 PROCEDURE — G8427 DOCREV CUR MEDS BY ELIG CLIN: HCPCS | Performed by: UROLOGY

## 2022-08-25 PROCEDURE — 1036F TOBACCO NON-USER: CPT | Performed by: UROLOGY

## 2022-08-25 PROCEDURE — G8399 PT W/DXA RESULTS DOCUMENT: HCPCS | Performed by: UROLOGY

## 2022-08-25 ASSESSMENT — ENCOUNTER SYMPTOMS
SHORTNESS OF BREATH: 0
ABDOMINAL PAIN: 1
SINUS PAIN: 1
WHEEZING: 0
BACK PAIN: 1
NAUSEA: 0
COUGH: 0
SINUS PRESSURE: 0
SORE THROAT: 0
DIARRHEA: 0
EYE REDNESS: 0
CONSTIPATION: 0
VOMITING: 0
EYE PAIN: 0

## 2022-08-25 NOTE — PROGRESS NOTES
Review of Systems   Constitutional:  Negative for appetite change, chills, fatigue and fever. HENT:  Positive for sinus pain and sneezing. Negative for congestion, sinus pressure and sore throat. Eyes:  Negative for pain, redness and visual disturbance. Respiratory:  Negative for cough, shortness of breath and wheezing. Cardiovascular:  Negative for chest pain and leg swelling. Gastrointestinal:  Positive for abdominal pain. Negative for constipation, diarrhea, nausea and vomiting. Endocrine: Negative for cold intolerance and heat intolerance. Genitourinary:  Positive for frequency and urgency. Negative for difficulty urinating, dysuria, flank pain and hematuria. Musculoskeletal:  Positive for back pain. Negative for joint swelling and myalgias. Skin:  Negative for rash and wound. Neurological:  Negative for dizziness, tremors and numbness. Hematological:  Bruises/bleeds easily.

## 2022-08-25 NOTE — PROGRESS NOTES
[address] [address]  Dept:  Lizzette Lucia [facility] Urology Office Note - Established    Patient:  Annette Pham  YOB: 1950  Date: 8/25/2022    The patient is a 71 y.o. female whopresents today for evaluation of the following problems:   Chief Complaint   Patient presents with    Bladder Problem     Here for 4 week follow up for overactive bladder       HPI  Here for oab and urgency. She is managing her symptoms. No dysruria, no hematuria, no pain. Summary of old records: N/A    Additional History: N/A    Procedures Today: N/A    Urinalysis today:  No results found for this visit on 08/25/22. Imaging Reviewed during this Office Visit: none  (results were independently reviewed by physician and radiology report verified)    AUA Symptom Score (8/25/2022):                                Last BUN and creatinine:  Lab Results   Component Value Date    BUN 24 (H) 12/14/2021     Lab Results   Component Value Date    CREATININE 0.63 12/14/2021       Additional Lab/Culture results: none    PAST MEDICAL, FAMILY AND SOCIAL HISTORY UPDATE:  Past Medical History:   Diagnosis Date    Abnormal EKG 12/2015    Chest pain     rare    Diverticulosis     History of bilateral breast cancer 10/2015    Short of breath on exertion     Urinary frequency     Wears glasses      Past Surgical History:   Procedure Laterality Date    APPENDECTOMY      BREAST BIOPSY Left 10/2015    cancer    BREAST IMPLANT REMOVAL  2019    COLON SURGERY      removal part due to endometriosis    COLONOSCOPY      multiple    CYST REMOVAL Left     knee    ENDOSCOPY, COLON, DIAGNOSTIC      IR BIOPSY LIVER PERCUTANEOUS  2/23/2022    IR BIOPSY LIVER PERCUTANEOUS 2/23/2022 [facility]ED[facility]    LIPOMA RESECTION Right     breast    MASTECTOMY, BILATERAL  12/2015    TX COLON CA SCRN NOT HI RSK IND N/A 8/22/2017 COLONOSCOPY and PHOTOS performed by Tereso Boyle DO at [address] [place] [address] W/O INSERTION IMPLANT Bilateral 7/11/2018    BREAST IMPLANT REMOVAL performed by Bob Magaña MD at [facility] (CERVIX REMOVED)      UPPER GASTROINTESTINAL ENDOSCOPY       Family History   Problem Relation Age of Onset    Arthritis Mother     Heart Disease Mother         chf    Stroke Mother     Arthritis Father     Heart Disease Father     Diabetes Father     Cancer Father         prostate, melanoma    Cancer Sister         breast    Cancer Brother         kidney    Mental Retardation Paternal Grandmother      Outpatient Medications Marked as Taking for the 8/25/22 encounter (Office Visit) with Jose Singer MD   Medication Sig Dispense Refill    ursodiol (ACTIGALL) 500 MG tablet Take 0.5 tablets by mouth in the morning and 0.5 tablets at noon and 0.5 tablets before bedtime.  90 tablet 3    NONFORMULARY Liver detox      omeprazole (PRILOSEC) 20 MG delayed release capsule Take 1 capsule by mouth every morning (before breakfast) 90 capsule 1    Mastectomy Bra MISC by Does not apply route 1 each 0    aspirin 81 MG chewable tablet Take 81 mg by mouth daily      Coenzyme Q10 (CO Q-10 PO) Take 1 capsule by mouth daily      Ginger, Zingiber officinalis, (GINGER ROOT) 550 MG CAPS Take 550 mg by mouth as needed      NONFORMULARY Take 500 mg by mouth daily Indications: Tumeric Capsules OTC      fexofenadine (ALLEGRA ALLERGY) 180 MG tablet Take 1 tablet by mouth daily (Patient taking differently: Take 180 mg by mouth daily as needed) 30 tablet 1    ibuprofen (ADVIL;MOTRIN) 600 MG tablet Take 1 tablet by mouth every 6 hours as needed for Pain 120 tablet 1    CALCIUM & MAGNESIUM CARBONATES PO Take 1 tablet by mouth daily       NONFORMULARY Take 1 capsule by mouth 2 times daily (with meals) [facility]: Natural plant sterols      Multiple Vitamins-Minerals (MULTI VITAMIN/MINERALS PO) Take 1 tablet by mouth daily       Psyllium (METAMUCIL PO) Take by mouth fibermucil brand      NONFORMULARY Take 1 capsule by mouth 2 times daily Circulation and vein support      Omega-3 Fatty Acids (OMEGA 3 PO) Take 1 capsule by mouth daily  mg,  mg, DHA 50 mg         (All medications reviewed and updated by provider sincelast office visit or hospitalization)   Ciprofloxacin hcl, Coffee bean extract [coffea arabica], Darvon [propoxyphene], Levaquin [levofloxacin in d5w], Other, and Penicillins  Social History     Tobacco Use   Smoking Status Never   Smokeless Tobacco Never      (If patient a smoker, smoking cessation counseling offered)     Social History     Substance and Sexual Activity   Alcohol Use No    Alcohol/week: 0.0 standard drinks       REVIEW OF SYSTEMS:  Review of Systems      Physical Exam:      Vitals:    08/25/22 1000   BP: 122/78   Pulse: 63   Temp: 98.2 °F (36.8 °C)   SpO2: 96%     Body mass index is 29.35 kg/m². Patient is a 71 y.o. female in noacute distress and alert and oriented to person, place and time. Physical Exam  Constitutional: Patient in no acute distress. Neuro: Alert andoriented to person, place and time. Psych: Mood normal, affect normal  Skin: No rash noted  HEENT: Head: Normocephalic and atraumatic  Conjunctivae and EOM are normal. Pupils are equal, round  Nose: Normal  Right External Ear: Normal; Left External Ear: Normal  Mouth: Mucosa Moist  Neck: Supple  Lungs: Respiratory effort is normal  Cardiovascular: Warm & Pink  Abdomen: Soft, non-tender, non-distended with no CVA,  No flank tenderness,  Or hepatosplenomegaly         and Plan      1. OAB (overactive bladder)           Plan:    Cons measure  No meds yet  Return in about 1 year (around 8/25/2023). Prescriptions Ordered:  No orders of the defined types were placed in this encounter. Orders Placed:  No orders of the defined types were placed in this encounter. Debbie Rivera MD    Agree with the ROS entered by the MA.

## 2022-10-21 ENCOUNTER — HOSPITAL ENCOUNTER (OUTPATIENT)
Dept: ULTRASOUND IMAGING | Age: 72
Discharge: HOME | End: 2022-10-23
Payer: MEDICARE

## 2022-10-21 ENCOUNTER — HOSPITAL ENCOUNTER (OUTPATIENT)
Age: 72
Discharge: HOME | End: 2022-10-21
Payer: MEDICARE

## 2022-10-21 DIAGNOSIS — K76.0 FATTY LIVER DISEASE, NONALCOHOLIC: ICD-10-CM

## 2022-10-21 DIAGNOSIS — K74.3 PRIMARY BILIARY CHOLANGITIS (HCC): ICD-10-CM

## 2022-10-21 DIAGNOSIS — R79.89 ABNORMAL LIVER FUNCTION TESTS: ICD-10-CM

## 2022-10-21 DIAGNOSIS — R76.8 ANTIMITOCHONDRIAL ANTIBODY POSITIVE: ICD-10-CM

## 2022-10-21 DIAGNOSIS — Z13.220 SCREENING, LIPID: ICD-10-CM

## 2022-10-21 LAB
ALBUMIN SERPL-MCNC: 4.3 G/DL (ref 3.5–5.2)
ALP BLD-CCNC: 86 U/L (ref 35–104)
ALT SERPL-CCNC: 24 U/L (ref 5–33)
ANION GAP SERPL CALCULATED.3IONS-SCNC: 10 MMOL/L (ref 9–17)
AST SERPL-CCNC: 21 U/L
BILIRUB SERPL-MCNC: 0.4 MG/DL (ref 0.3–1.2)
BUN BLDV-MCNC: 21 MG/DL (ref 8–23)
CALCIUM SERPL-MCNC: 10 MG/DL (ref 8.6–10.4)
CHLORIDE BLD-SCNC: 106 MMOL/L (ref 98–107)
CHOLESTEROL, FASTING: 198 MG/DL
CHOLESTEROL/HDL RATIO: 2.6
CO2: 28 MMOL/L (ref 20–31)
CREAT SERPL-MCNC: 0.69 MG/DL (ref 0.5–0.9)
GFR SERPL CREATININE-BSD FRML MDRD: >60 ML/MIN/1.73M2
GLUCOSE BLD-MCNC: 97 MG/DL (ref 70–99)
HCT VFR BLD CALC: 42.8 % (ref 36–46)
HDLC SERPL-MCNC: 76 MG/DL
HEMOGLOBIN: 14.3 G/DL (ref 12–16)
LDL CHOLESTEROL: 111 MG/DL (ref 0–130)
MCH RBC QN AUTO: 30.8 PG (ref 26–34)
MCHC RBC AUTO-ENTMCNC: 33.6 G/DL (ref 31–37)
MCV RBC AUTO: 91.8 FL (ref 80–100)
PDW BLD-RTO: 12.5 % (ref 11.5–14.9)
PLATELET # BLD: 332 K/UL (ref 150–450)
PMV BLD AUTO: 8.8 FL (ref 6–12)
POTASSIUM SERPL-SCNC: 4.2 MMOL/L (ref 3.7–5.3)
RBC # BLD: 4.66 M/UL (ref 4–5.2)
SODIUM BLD-SCNC: 144 MMOL/L (ref 135–144)
TOTAL PROTEIN: 7.2 G/DL (ref 6.4–8.3)
TRIGLYCERIDE, FASTING: 56 MG/DL
WBC # BLD: 7.4 K/UL (ref 3.5–11)

## 2022-10-21 PROCEDURE — 36415 COLL VENOUS BLD VENIPUNCTURE: CPT

## 2022-10-21 PROCEDURE — 83516 IMMUNOASSAY NONANTIBODY: CPT

## 2022-10-21 PROCEDURE — 85027 COMPLETE CBC AUTOMATED: CPT

## 2022-10-21 PROCEDURE — 80053 COMPREHEN METABOLIC PANEL: CPT

## 2022-10-21 PROCEDURE — 76705 ECHO EXAM OF ABDOMEN: CPT

## 2022-10-21 PROCEDURE — 80061 LIPID PANEL: CPT

## 2022-10-22 LAB — MITOCHONDRIAL ANTIBODY: 92 U/ML (ref 0–4)

## 2023-01-20 ENCOUNTER — OFFICE VISIT (OUTPATIENT)
Dept: GASTROENTEROLOGY | Age: 73
End: 2023-01-20
Payer: MEDICARE

## 2023-01-20 VITALS
SYSTOLIC BLOOD PRESSURE: 146 MMHG | HEIGHT: 64 IN | WEIGHT: 173 LBS | HEART RATE: 58 BPM | DIASTOLIC BLOOD PRESSURE: 80 MMHG | BODY MASS INDEX: 29.53 KG/M2

## 2023-01-20 DIAGNOSIS — K74.3 PRIMARY BILIARY CHOLANGITIS (HCC): Primary | ICD-10-CM

## 2023-01-20 PROCEDURE — G8484 FLU IMMUNIZE NO ADMIN: HCPCS | Performed by: NURSE PRACTITIONER

## 2023-01-20 PROCEDURE — G8417 CALC BMI ABV UP PARAM F/U: HCPCS | Performed by: NURSE PRACTITIONER

## 2023-01-20 PROCEDURE — G8427 DOCREV CUR MEDS BY ELIG CLIN: HCPCS | Performed by: NURSE PRACTITIONER

## 2023-01-20 PROCEDURE — 3017F COLORECTAL CA SCREEN DOC REV: CPT | Performed by: NURSE PRACTITIONER

## 2023-01-20 PROCEDURE — 99213 OFFICE O/P EST LOW 20 MIN: CPT | Performed by: NURSE PRACTITIONER

## 2023-01-20 PROCEDURE — 1090F PRES/ABSN URINE INCON ASSESS: CPT | Performed by: NURSE PRACTITIONER

## 2023-01-20 PROCEDURE — 1036F TOBACCO NON-USER: CPT | Performed by: NURSE PRACTITIONER

## 2023-01-20 PROCEDURE — 1123F ACP DISCUSS/DSCN MKR DOCD: CPT | Performed by: NURSE PRACTITIONER

## 2023-01-20 PROCEDURE — G8399 PT W/DXA RESULTS DOCUMENT: HCPCS | Performed by: NURSE PRACTITIONER

## 2023-01-20 RX ORDER — URSODIOL 500 MG/1
250 TABLET, FILM COATED ORAL 3 TIMES DAILY
Qty: 90 TABLET | Refills: 3 | Status: SHIPPED | OUTPATIENT
Start: 2023-01-20

## 2023-01-20 ASSESSMENT — ENCOUNTER SYMPTOMS
GASTROINTESTINAL NEGATIVE: 1
RESPIRATORY NEGATIVE: 1
ALLERGIC/IMMUNOLOGIC NEGATIVE: 1

## 2023-01-20 NOTE — PROGRESS NOTES
GI CLINIC FOLLOW UP    INTERVAL HISTORY:   No referring provider defined for this encounter. Chief Complaint   Patient presents with    Other     Pt here for liver function f/u, labs and US done 10/21     HISTORY OF PRESENT ILLNESS:     Patient being seen for follow-up labs, [address]. Patient has hx of PBC. Has been on Actigall TID. Liver battery WNL  AMA improved to 92  US liver :  Nonspecific coarse heterogeneous echotexture of the liver would suggest   chronic hepatocellular disease. .  Simple cyst 5.8 cm near the dome of the   liver. At present she is doing well  No abdominal pain, nausea, vomiting   No fevers, chills  No rash, pruritus. Tolerating diet well  No dysphagia, odynophagia, dyspeptic symptoms. Has good appetite    Bowel movements are satisfactory. Past Medical,Family, and Social History reviewed and does contribute to the patient presentingcondition. Patient's PMH/PSH,SH,PSYCH Hx, MEDs, ALLERGIES, and ROS were all reviewed and updated in the appropriate sections.     PAST MEDICAL HISTORY:  Past Medical History:   Diagnosis Date    Abnormal EKG 12/2015    Chest pain     rare    Diverticulosis     History of bilateral breast cancer 10/2015    Short of breath on exertion     Urinary frequency     Wears glasses        Past Surgical History:   Procedure Laterality Date    APPENDECTOMY      BREAST BIOPSY Left 10/2015    cancer    BREAST IMPLANT REMOVAL  2019    COLON SURGERY      removal part due to endometriosis    COLONOSCOPY      multiple    CYST REMOVAL Left     knee    ENDOSCOPY, COLON, DIAGNOSTIC      IR BIOPSY LIVER PERCUTANEOUS  2/23/2022    IR BIOPSY LIVER PERCUTANEOUS 2/23/2022 [facility] PROCEDURES    LIPOMA RESECTION Right     breast    MASTECTOMY, BILATERAL  12/2015    NE COLON CA SCRN NOT HI RSK IND N/A 8/22/2017    COLONOSCOPY and PHOTOS performed by Marguerite Hairston DO at [facility]. W/O INSERTION IMPLANT Bilateral 7/11/2018    BREAST IMPLANT REMOVAL performed by Aureliano Shankar MD at [address] [place] [address] (CERVIX REMOVED)      UPPER GASTROINTESTINAL ENDOSCOPY         CURRENT MEDICATIONS:    Current Outpatient Medications:     polyethyl glycol-propyl glycol 0.4-0.3 % (SYSTANE ULTRA) 0.4-0.3 % ophthalmic solution, Place 1 drop into both eyes as needed for Dry Eyes, Disp: , Rfl: 0    ursodiol (ACTIGALL) 500 MG tablet, Take 0.5 tablets by mouth in the morning and 0.5 tablets at noon and 0.5 tablets before bedtime. , Disp: 90 tablet, Rfl: 3    NONFORMULARY, Liver detox, Disp: , Rfl:     omeprazole (PRILOSEC) 20 MG delayed release capsule, Take 1 capsule by mouth every morning (before breakfast), Disp: 90 capsule, Rfl: 1    Mastectomy Bra MISC, by Does not apply route, Disp: 1 each, Rfl: 0    aspirin 81 MG chewable tablet, Take 81 mg by mouth daily, Disp: , Rfl:     Coenzyme Q10 (CO Q-10 PO), Take 1 capsule by mouth daily, Disp: , Rfl:     Ginger, Zingiber officinalis, (GINGER ROOT) 550 MG CAPS, Take 550 mg by mouth as needed, Disp: , Rfl:     NONFORMULARY, Take 500 mg by mouth daily Indications: Tumeric Capsules OTC, Disp: , Rfl:     fexofenadine (ALLEGRA ALLERGY) 180 MG tablet, Take 1 tablet by mouth daily (Patient taking differently: Take 180 mg by mouth daily as needed), Disp: 30 tablet, Rfl: 1    ibuprofen (ADVIL;MOTRIN) 600 MG tablet, Take 1 tablet by mouth every 6 hours as needed for Pain, Disp: 120 tablet, Rfl: 1    CALCIUM & MAGNESIUM CARBONATES PO, Take 1 tablet by mouth daily , Disp: , Rfl:     NONFORMULARY, Take 1 capsule by mouth 2 times daily (with meals) [facility]: Natural plant sterols, Disp: , Rfl:     Multiple Vitamins-Minerals (MULTI VITAMIN/MINERALS PO), Take 1 tablet by mouth daily , Disp: , Rfl:     Psyllium (METAMUCIL PO), Take by mouth fibermucil brand, Disp: , Rfl:     NONFORMULARY, Take 1 capsule by mouth 2 times daily Circulation and vein support, Disp: , Rfl:     Omega-3 Fatty Acids (OMEGA 3 PO), Take 1 capsule by mouth daily  mg,  mg, DHA 50 mg , Disp: , Rfl:     ALLERGIES:   Allergies   Allergen Reactions    Ciprofloxacin Hcl Itching     tendonitis    Coffee Bean Extract [Coffea Arabica] Nausea Only    Darvon [Propoxyphene] Other (See Comments)     Patient relates it increased pain rather than decreased pain. Levaquin [Levofloxacin In D5w] Other (See Comments)     tendonitits    Other Other (See Comments)     Fluoroquinolones cause patient to have tendonitis    Penicillins Hives       FAMILY HISTORY:       Problem Relation Age of Onset    Arthritis Mother     Heart Disease Mother         chf    Stroke Mother     Arthritis Father     Heart Disease Father     Diabetes Father     Cancer Father         prostate, melanoma    Cancer Sister         breast    Cancer Brother         kidney    Mental Retardation Paternal Grandmother          SOCIAL HISTORY:   Social History     Socioeconomic History    Marital status:       Spouse name: Not on file    Number of children: Not on file    Years of education: Not on file    Highest education level: Not on file   Occupational History    Not on file   Tobacco Use    Smoking status: Never    Smokeless tobacco: Never   Vaping Use    Vaping Use: Never used   Substance and Sexual Activity    Alcohol use: No     Alcohol/week: 0.0 standard drinks    Drug use: No    Sexual activity: Not on file   Other Topics Concern    Not on file   Social History Narrative    Not on file     Social Determinants of Health     Financial Resource Strain: Low Risk     Difficulty of Paying Living Expenses: Not hard at all   Food Insecurity: No Food Insecurity    Worried About Running Out of Food in the Last Year: Never true    [address] in the Last Year: Never true   Transportation Needs: Not on file   Physical Activity: Insufficiently Active    Days of Exercise per Week: 3 days    Minutes of Exercise per Session: 30 min   Stress: Not on file   Social Connections: Not on file   Intimate Partner Violence: Not on file   Housing Stability: Not on file       REVIEW OF SYSTEMS: A 12-point review of systemswas obtained and pertinent positives and negatives were enumerated above in the history of present illness. All other reviewed systems / symptoms were negative. Review of Systems   Constitutional:  Positive for fatigue. HENT: Negative. Eyes:  Positive for visual disturbance (glasses). Respiratory: Negative. Cardiovascular: Negative. Gastrointestinal: Negative. Endocrine: Negative. Genitourinary: Negative. Musculoskeletal: Negative. Skin: Negative. Allergic/Immunologic: Negative. Neurological: Negative. Hematological:  Bruises/bleeds easily. Psychiatric/Behavioral:  Positive for sleep disturbance. PHYSICAL EXAMINATION: Vital signs reviewed per the nursing documentation. Ht 5' 4\" (1.626 m)   Wt 173 lb (78.5 kg)   LMP 10/28/2000   BMI 29.70 kg/m²   Body mass index is 29.7 kg/m². Physical Exam  Constitutional:       Appearance: Normal appearance. Eyes:      General: No scleral icterus. Pupils: Pupils are equal, round, and reactive to light. Cardiovascular:      Rate and Rhythm: Normal rate and regular rhythm. Heart sounds: Normal heart sounds. Pulmonary:      Effort: Pulmonary effort is normal.      Breath sounds: Normal breath sounds. Abdominal:      General: Bowel sounds are normal. There is no distension. Palpations: Abdomen is soft. There is no mass. Tenderness: There is no abdominal tenderness. There is no guarding. Skin:     General: Skin is warm and dry. Coloration: Skin is not jaundiced. Neurological:      Mental Status: She is alert and oriented to person, place, and time. Mental status is at baseline.          LABORATORY DATA: Reviewed  Lab Results   Component Value Date    WBC 7.4 10/21/2022    HGB 14.3 10/21/2022    HCT 42.8 10/21/2022    MCV 91.8 10/21/2022     10/21/2022     10/21/2022    K 4.2 10/21/2022     10/21/2022    CO2 28 10/21/2022    BUN 21 10/21/2022    CREATININE 0.69 10/21/2022    LABALBU 4.3 10/21/2022    BILITOT 0.4 10/21/2022    ALKPHOS 86 10/21/2022    AST 21 10/21/2022    ALT 24 10/21/2022    INR 1.0 02/23/2022         Lab Results   Component Value Date    RBC 4.66 10/21/2022    HGB 14.3 10/21/2022    MCV 91.8 10/21/2022    MCH 30.8 10/21/2022    MCHC 33.6 10/21/2022    RDW 12.5 10/21/2022    MPV 8.8 10/21/2022    BASOPCT 1 07/26/2019    LYMPHSABS 1.72 07/26/2019    MONOSABS 0.71 07/26/2019    NEUTROABS 5.86 07/26/2019    EOSABS 0.10 07/26/2019    BASOSABS 0.06 07/26/2019         DIAGNOSTIC TESTING:     No results found. IMPRESSION: Ms. Fabrice Morales is a 71 y.o. female with    Diagnosis Orders   1. Primary biliary cholangitis (HCC)  MITOCHONDRIAL ANTIBODIES, M2, IGG    Hepatic Function Panel        At present patient appears stable. Will repeat AMA, liver battery in 6 months  To continue Actigall as prescribed. Thank you for allowing me to participate in the care of Ms. Rubio. For any further questions please do not hesitate to contact me. I have reviewed and agree with the ROS entered by the MA/GWENDOLYNN.          MIRIAM Don    [facility]  Office #: [phone]

## 2023-03-28 ENCOUNTER — OFFICE VISIT (OUTPATIENT)
Dept: ONCOLOGY | Age: 73
End: 2023-03-28
Payer: MEDICARE

## 2023-03-28 ENCOUNTER — TELEPHONE (OUTPATIENT)
Dept: ONCOLOGY | Age: 73
End: 2023-03-28

## 2023-03-28 VITALS
TEMPERATURE: 97.6 F | SYSTOLIC BLOOD PRESSURE: 146 MMHG | WEIGHT: 171.9 LBS | BODY MASS INDEX: 29.51 KG/M2 | HEART RATE: 50 BPM | DIASTOLIC BLOOD PRESSURE: 85 MMHG

## 2023-03-28 DIAGNOSIS — C50.212 MALIGNANT NEOPLASM OF UPPER-INNER QUADRANT OF LEFT FEMALE BREAST, UNSPECIFIED ESTROGEN RECEPTOR STATUS (HCC): Primary | ICD-10-CM

## 2023-03-28 DIAGNOSIS — Z90.13 STATUS POST BILATERAL MASTECTOMY: ICD-10-CM

## 2023-03-28 PROCEDURE — G8484 FLU IMMUNIZE NO ADMIN: HCPCS | Performed by: INTERNAL MEDICINE

## 2023-03-28 PROCEDURE — 99211 OFF/OP EST MAY X REQ PHY/QHP: CPT | Performed by: INTERNAL MEDICINE

## 2023-03-28 PROCEDURE — G8399 PT W/DXA RESULTS DOCUMENT: HCPCS | Performed by: INTERNAL MEDICINE

## 2023-03-28 PROCEDURE — 1123F ACP DISCUSS/DSCN MKR DOCD: CPT | Performed by: INTERNAL MEDICINE

## 2023-03-28 PROCEDURE — G8427 DOCREV CUR MEDS BY ELIG CLIN: HCPCS | Performed by: INTERNAL MEDICINE

## 2023-03-28 PROCEDURE — G8417 CALC BMI ABV UP PARAM F/U: HCPCS | Performed by: INTERNAL MEDICINE

## 2023-03-28 PROCEDURE — 1036F TOBACCO NON-USER: CPT | Performed by: INTERNAL MEDICINE

## 2023-03-28 PROCEDURE — 99213 OFFICE O/P EST LOW 20 MIN: CPT | Performed by: INTERNAL MEDICINE

## 2023-03-28 PROCEDURE — 1090F PRES/ABSN URINE INCON ASSESS: CPT | Performed by: INTERNAL MEDICINE

## 2023-03-28 PROCEDURE — 3017F COLORECTAL CA SCREEN DOC REV: CPT | Performed by: INTERNAL MEDICINE

## 2023-03-28 NOTE — PROGRESS NOTES
abdominal pain, diarrhea or constipation. Genitourinary: Denies dysuria, hematuria, frequency, urgency or incontinence. Neurological: Denies headaches, decreased LOC, no sensory or motor focal deficits. Musculoskeletal: No joint swelling. Skin: Status post bilateral mastectomy, no skin rash or lesions  Psychiatric:  No anxiety, no depression. Insomnia   Endocrine: No diabetes or thyroid disease. Hematologic: No bleeding, no adenopathy. Minimal, intermittent lymphedema in left arm    PHYSICAL EXAM:  The patient is not in acute distress. Vital signs: Blood pressure (!) 146/85, pulse 50, temperature 97.6 °F (36.4 °C), temperature source Temporal, weight 171 lb 14.4 oz (78 kg), last menstrual period 10/28/2000, not currently breastfeeding. HEENT:  Eyes are normal. Ears, nose and throat are normal.  Neck: Supple. No lymph node enlargement. No thyroid enlargement. Trachea is centrally located. Chest:  Clear to auscultation. No wheezes or crepitations. Breast: S/P bilateral mastectomy, mild tenderness in right incisional area, well healed, scar tissue, no infection, no evidence of recurrence, no lymphadenopathy, no nodularity, no lymphedema appreciated heart: Regular sinus rhythm. Abdomen: Soft, nontender. No hepatosplenomegaly. No masses. Midline incision (splenectomy and partial colectomy) healed  Extremities:  No edema. Lymph Nodes:  No cervical, axillary or inguinal lymph node enlargement or adenopathy. Neurologic: Conscious and oriented. No focal neurological deficits. Psychosocial: No depression, anxiety or stress. Skin: No rashes, bruises or ecchymoses.       REVIEW OF RADIOLOGICAL RESULTS:      DEXA:         T-SCORE:  05/31/2016 07/19/2018   LUMBAR:        -0.6  -1.0  LEFT HIP:         -0.9  -1.0   FEMORAL NECK: -1.1  -1.4    REVIEW OF LABORATORY DATA:  Lab Results   Component Value Date    WBC 7.4 10/21/2022    HGB 14.3 10/21/2022    HCT 42.8 10/21/2022    MCV 91.8 10/21/2022

## 2023-03-28 NOTE — TELEPHONE ENCOUNTER
AVS from 3/28/23      Rv in one year no labs     Rv scheduled for 3/26 @ 10:15 am     Pt was given AVS and appointment schedule    Electronically signed by Otilio Severs on 3/28/2023 at 11:59 AM

## 2023-07-11 ENCOUNTER — HOSPITAL ENCOUNTER (OUTPATIENT)
Age: 73
Discharge: HOME | End: 2023-07-11
Payer: MEDICARE

## 2023-07-11 DIAGNOSIS — K74.3 PRIMARY BILIARY CHOLANGITIS (HCC): ICD-10-CM

## 2023-07-11 LAB
ALBUMIN SERPL-MCNC: 4.2 G/DL (ref 3.5–5.2)
ALP SERPL-CCNC: 68 U/L (ref 35–104)
ALT SERPL-CCNC: 18 U/L (ref 5–33)
AST SERPL-CCNC: 20 U/L
BILIRUB DIRECT SERPL-MCNC: <0.1 MG/DL
BILIRUB INDIRECT SERPL-MCNC: NORMAL MG/DL (ref 0–1)
BILIRUB SERPL-MCNC: 0.3 MG/DL (ref 0.3–1.2)
PROT SERPL-MCNC: 7.7 G/DL (ref 6.4–8.3)

## 2023-07-11 PROCEDURE — 36415 COLL VENOUS BLD VENIPUNCTURE: CPT

## 2023-07-11 PROCEDURE — 80076 HEPATIC FUNCTION PANEL: CPT

## 2023-07-11 PROCEDURE — 83516 IMMUNOASSAY NONANTIBODY: CPT

## 2023-07-12 LAB — MITOCHONDRIA M2 IGG SER-ACNC: 74 U/ML (ref 0–4)

## 2023-07-21 ENCOUNTER — OFFICE VISIT (OUTPATIENT)
Dept: GASTROENTEROLOGY | Age: 73
End: 2023-07-21
Payer: MEDICARE

## 2023-07-21 VITALS
BODY MASS INDEX: 29.33 KG/M2 | TEMPERATURE: 98.1 F | SYSTOLIC BLOOD PRESSURE: 117 MMHG | DIASTOLIC BLOOD PRESSURE: 72 MMHG | WEIGHT: 171.8 LBS | HEART RATE: 60 BPM | HEIGHT: 64 IN

## 2023-07-21 DIAGNOSIS — K74.3 PRIMARY BILIARY CIRRHOSIS (HCC): Primary | ICD-10-CM

## 2023-07-21 DIAGNOSIS — K76.89 LIVER CYST: ICD-10-CM

## 2023-07-21 PROCEDURE — 1090F PRES/ABSN URINE INCON ASSESS: CPT | Performed by: INTERNAL MEDICINE

## 2023-07-21 PROCEDURE — 3017F COLORECTAL CA SCREEN DOC REV: CPT | Performed by: INTERNAL MEDICINE

## 2023-07-21 PROCEDURE — G8427 DOCREV CUR MEDS BY ELIG CLIN: HCPCS | Performed by: INTERNAL MEDICINE

## 2023-07-21 PROCEDURE — 99214 OFFICE O/P EST MOD 30 MIN: CPT | Performed by: INTERNAL MEDICINE

## 2023-07-21 PROCEDURE — G8399 PT W/DXA RESULTS DOCUMENT: HCPCS | Performed by: INTERNAL MEDICINE

## 2023-07-21 PROCEDURE — G8417 CALC BMI ABV UP PARAM F/U: HCPCS | Performed by: INTERNAL MEDICINE

## 2023-07-21 PROCEDURE — 1036F TOBACCO NON-USER: CPT | Performed by: INTERNAL MEDICINE

## 2023-07-21 PROCEDURE — 1123F ACP DISCUSS/DSCN MKR DOCD: CPT | Performed by: INTERNAL MEDICINE

## 2023-07-21 RX ORDER — URSODIOL 500 MG/1
500 TABLET, FILM COATED ORAL 2 TIMES DAILY
Qty: 180 TABLET | Refills: 3 | Status: SHIPPED | OUTPATIENT
Start: 2023-07-21

## 2023-07-21 ASSESSMENT — ENCOUNTER SYMPTOMS
ANAL BLEEDING: 0
DIARRHEA: 0
CONSTIPATION: 0
WHEEZING: 0
VOICE CHANGE: 0
SINUS PRESSURE: 0
COUGH: 0
VOMITING: 0
TROUBLE SWALLOWING: 0
RECTAL PAIN: 1
BACK PAIN: 0
NAUSEA: 1
ABDOMINAL DISTENTION: 0
ABDOMINAL PAIN: 0
BLOOD IN STOOL: 0
CHOKING: 0
SORE THROAT: 0

## 2023-07-21 NOTE — PROGRESS NOTES
GI OFFICE FOLLOW UP    INTERVAL HISTORY:   No referring provider defined for this encounter. Chief Complaint   Patient presents with    Discuss Labs     Patient is here to be seen for a 6 month follow up on labs. Patient was last seen in office on 1/20/23.      2  1. Primary biliary cirrhosis ([address])    2. Liver cyst              HISTORY OF PRESENT ILLNESS:   Patient seen for follow-up of chronic liver disease. Patient is known to have primary biliary cirrhosis basing on the serology and also histology. She had a liver biopsy done in February 2022 and at that time it was felt that patient may have minimal bile duct injury. At present her mitochondrial antibody is about 74. Used to be around 90 in the past.  Her alkaline phosphatase, transaminases, bilirubin, albumin normal.     patient is asymptomatic. Good appetite. No other symptoms of cholestasis. Ultrasound of the liver that was done late 2022 revealed simple cyst in the dome of the liver. Also has signs of chronic liver disease. Past Medical,Family, and Social History reviewed and does contribute to the patient presenting condition. Patient's PMH/PSH,SH,PSYCH Hx, MEDs, ALLERGIES, and ROS were all reviewed and updated in the appropriate sections.  Yes      PAST MEDICAL HISTORY:  Past Medical History:   Diagnosis Date    Abnormal EKG 12/2015    Chest pain     rare    Diverticulosis     History of bilateral breast cancer 10/2015    Short of breath on exertion     Urinary frequency     Wears glasses        Past Surgical History:   Procedure Laterality Date    APPENDECTOMY      BREAST BIOPSY Left 10/2015    cancer    BREAST IMPLANT REMOVAL  2019    COLON SURGERY      removal part due to endometriosis    COLONOSCOPY      multiple    CYST REMOVAL Left     knee    ENDOSCOPY, COLON, DIAGNOSTIC      IR BIOPSY LIVER PERCUTANEOUS  2/23/2022

## 2023-07-27 ENCOUNTER — TELEPHONE (OUTPATIENT)
Dept: GASTROENTEROLOGY | Age: 73
End: 2023-07-27

## 2023-07-27 NOTE — TELEPHONE ENCOUNTER
Patient called as last week patient saw Dr Ivis Noyola and he increased her Actigall from 750 mg to 1000 mg. She states she feels like no she is having side affects from it. She says she has frequent urination, pain in her bladder and her face is itchy. Please let me know what should be done?

## 2023-07-31 ENCOUNTER — HOSPITAL ENCOUNTER (OUTPATIENT)
Dept: GENERAL RADIOLOGY | Facility: CLINIC | Age: 73
Discharge: HOME | End: 2023-08-02
Payer: MEDICARE

## 2023-07-31 ENCOUNTER — HOSPITAL ENCOUNTER (OUTPATIENT)
Facility: CLINIC | Age: 73
Discharge: HOME | End: 2023-08-02
Payer: MEDICARE

## 2023-07-31 DIAGNOSIS — G89.29 CHRONIC PAIN OF RIGHT KNEE: ICD-10-CM

## 2023-07-31 DIAGNOSIS — M79.604 RIGHT LEG PAIN: ICD-10-CM

## 2023-07-31 DIAGNOSIS — M25.561 CHRONIC PAIN OF RIGHT KNEE: ICD-10-CM

## 2023-07-31 PROCEDURE — 72100 X-RAY EXAM L-S SPINE 2/3 VWS: CPT

## 2023-07-31 PROCEDURE — 73562 X-RAY EXAM OF KNEE 3: CPT

## 2023-08-12 DIAGNOSIS — K74.3 PRIMARY BILIARY CHOLANGITIS (HCC): Primary | ICD-10-CM

## 2023-08-12 DIAGNOSIS — K74.60 HEPATIC CIRRHOSIS, UNSPECIFIED HEPATIC CIRRHOSIS TYPE, UNSPECIFIED WHETHER ASCITES PRESENT (HCC): ICD-10-CM

## 2023-08-16 RX ORDER — URSODIOL 500 MG/1
TABLET, FILM COATED ORAL
Qty: 135 TABLET | Refills: 5 | Status: SHIPPED | OUTPATIENT
Start: 2023-08-16

## 2023-08-28 ENCOUNTER — OFFICE VISIT (OUTPATIENT)
Dept: ORTHOPEDIC SURGERY | Age: 73
End: 2023-08-28
Payer: MEDICARE

## 2023-08-28 DIAGNOSIS — G89.29 CHRONIC PAIN OF RIGHT KNEE: Primary | ICD-10-CM

## 2023-08-28 DIAGNOSIS — M25.561 CHRONIC PAIN OF RIGHT KNEE: Primary | ICD-10-CM

## 2023-08-28 PROCEDURE — 1090F PRES/ABSN URINE INCON ASSESS: CPT | Performed by: ORTHOPAEDIC SURGERY

## 2023-08-28 PROCEDURE — 99203 OFFICE O/P NEW LOW 30 MIN: CPT | Performed by: ORTHOPAEDIC SURGERY

## 2023-08-28 PROCEDURE — 1036F TOBACCO NON-USER: CPT | Performed by: ORTHOPAEDIC SURGERY

## 2023-08-28 PROCEDURE — 3017F COLORECTAL CA SCREEN DOC REV: CPT | Performed by: ORTHOPAEDIC SURGERY

## 2023-08-28 PROCEDURE — G8399 PT W/DXA RESULTS DOCUMENT: HCPCS | Performed by: ORTHOPAEDIC SURGERY

## 2023-08-28 PROCEDURE — G8417 CALC BMI ABV UP PARAM F/U: HCPCS | Performed by: ORTHOPAEDIC SURGERY

## 2023-08-28 PROCEDURE — G8427 DOCREV CUR MEDS BY ELIG CLIN: HCPCS | Performed by: ORTHOPAEDIC SURGERY

## 2023-08-28 PROCEDURE — 1123F ACP DISCUSS/DSCN MKR DOCD: CPT | Performed by: ORTHOPAEDIC SURGERY

## 2023-08-28 NOTE — PROGRESS NOTES
Albert Vidal M.D.            [address] [facility], [address] [facility], [address] [place] [address] [address] [place]           Dept Phone: [phone]           Dept Fax:  [address]          Dept Phone: [phone]           Dept Fax:  [phone]      Chief Compliant:  Chief Complaint   Patient presents with    Pain     Rt knee        History of Present Illness: This is a 68 y.o. female who presents to the clinic today for evaluation / follow up of see previous dictation done earlier this encounter. Review of Systems   Constitutional: Negative for fever, chills, sweats. Eyes: Negative for changes in vision, or pain. HENT: Negative for ear ache, epistaxis, or sore throat. Respiratory/Cardio: Negative for Chest pain, palpitations, SOB, or cough. Gastrointestinal: Negative for abdominal pain, N/V/D. Genitourinary: Negative for dysuria, frequency, urgency, or hematuria. Neurological: Negative for headache, numbness, or weakness. Integumentary: Negative for rash, itching, laceration, or abrasion. Musculoskeletal: Positive for Pain (Rt knee)       Physical Exam:  Constitutional: Patient is oriented to person, place, and time. Patient appears well-developed and well nourished. HENT: Negative otherwise noted  Head: Normocephalic and Atraumatic  Nose: Normal  Eyes: Conjunctivae and EOM are normal  Neck: Normal range of motion Neck supple. Respiratory/Cardio: Effort normal. No respiratory distress. Musculoskeletal: Previous dictation  Neurological: Patient is alert and oriented to person, place, and time. Normal strength. No sensory deficit. Skin: Skin is warm and dry  Psychiatric: Behavior is normal. Thought content normal.  Nursing note and vitals reviewed. Labs and Imaging:     XR taken today:  No results found.       No orders of the defined

## 2023-08-30 ENCOUNTER — HOSPITAL ENCOUNTER (OUTPATIENT)
Dept: MRI IMAGING | Age: 73
Discharge: HOME | End: 2023-09-01
Attending: ORTHOPAEDIC SURGERY
Payer: MEDICARE

## 2023-08-30 DIAGNOSIS — G89.29 CHRONIC PAIN OF RIGHT KNEE: ICD-10-CM

## 2023-08-30 DIAGNOSIS — M25.561 CHRONIC PAIN OF RIGHT KNEE: ICD-10-CM

## 2023-08-30 PROCEDURE — 73721 MRI JNT OF LWR EXTRE W/O DYE: CPT

## 2023-08-31 ENCOUNTER — OFFICE VISIT (OUTPATIENT)
Dept: UROLOGY | Age: 73
End: 2023-08-31
Payer: MEDICARE

## 2023-08-31 VITALS
HEIGHT: 64 IN | DIASTOLIC BLOOD PRESSURE: 70 MMHG | SYSTOLIC BLOOD PRESSURE: 126 MMHG | BODY MASS INDEX: 29.71 KG/M2 | OXYGEN SATURATION: 98 % | WEIGHT: 174 LBS | TEMPERATURE: 97.3 F | HEART RATE: 88 BPM

## 2023-08-31 DIAGNOSIS — N32.81 OAB (OVERACTIVE BLADDER): Primary | ICD-10-CM

## 2023-08-31 DIAGNOSIS — R33.9 INCOMPLETE BLADDER EMPTYING: ICD-10-CM

## 2023-08-31 PROCEDURE — 99212 OFFICE O/P EST SF 10 MIN: CPT | Performed by: NURSE PRACTITIONER

## 2023-08-31 PROCEDURE — 1123F ACP DISCUSS/DSCN MKR DOCD: CPT | Performed by: NURSE PRACTITIONER

## 2023-08-31 PROCEDURE — 1036F TOBACCO NON-USER: CPT | Performed by: NURSE PRACTITIONER

## 2023-08-31 PROCEDURE — 3017F COLORECTAL CA SCREEN DOC REV: CPT | Performed by: NURSE PRACTITIONER

## 2023-08-31 PROCEDURE — 1090F PRES/ABSN URINE INCON ASSESS: CPT | Performed by: NURSE PRACTITIONER

## 2023-08-31 PROCEDURE — G8417 CALC BMI ABV UP PARAM F/U: HCPCS | Performed by: NURSE PRACTITIONER

## 2023-08-31 PROCEDURE — G8399 PT W/DXA RESULTS DOCUMENT: HCPCS | Performed by: NURSE PRACTITIONER

## 2023-08-31 PROCEDURE — G8427 DOCREV CUR MEDS BY ELIG CLIN: HCPCS | Performed by: NURSE PRACTITIONER

## 2023-08-31 ASSESSMENT — ENCOUNTER SYMPTOMS
NAUSEA: 0
ABDOMINAL PAIN: 0
SHORTNESS OF BREATH: 0
COUGH: 0
VOMITING: 0
BACK PAIN: 0
CONSTIPATION: 0
DIARRHEA: 0
WHEEZING: 0
EYE PAIN: 0
EYE REDNESS: 0

## 2023-09-21 ENCOUNTER — HOSPITAL ENCOUNTER (OUTPATIENT)
Dept: PREADMISSION TESTING | Age: 73
Discharge: HOME | End: 2023-09-21
Payer: MEDICARE

## 2023-09-21 VITALS
SYSTOLIC BLOOD PRESSURE: 128 MMHG | RESPIRATION RATE: 20 BRPM | WEIGHT: 169 LBS | DIASTOLIC BLOOD PRESSURE: 70 MMHG | HEART RATE: 47 BPM | BODY MASS INDEX: 28.85 KG/M2 | TEMPERATURE: 99.1 F | OXYGEN SATURATION: 98 % | HEIGHT: 64 IN

## 2023-09-21 LAB
ANION GAP SERPL CALCULATED.3IONS-SCNC: 9 MMOL/L (ref 9–17)
BASOPHILS # BLD: 0.1 K/UL (ref 0–0.2)
BASOPHILS NFR BLD: 1 % (ref 0–2)
BUN SERPL-MCNC: 24 MG/DL (ref 8–23)
CALCIUM SERPL-MCNC: 10 MG/DL (ref 8.6–10.4)
CHLORIDE SERPL-SCNC: 104 MMOL/L (ref 98–107)
CO2 SERPL-SCNC: 27 MMOL/L (ref 20–31)
CREAT SERPL-MCNC: 0.6 MG/DL (ref 0.5–0.9)
EOSINOPHIL # BLD: 0.4 K/UL (ref 0–0.4)
EOSINOPHILS RELATIVE PERCENT: 5 % (ref 0–4)
ERYTHROCYTE [DISTWIDTH] IN BLOOD BY AUTOMATED COUNT: 13 % (ref 11.5–14.9)
GFR SERPL CREATININE-BSD FRML MDRD: >60 ML/MIN/1.73M2
GLUCOSE SERPL-MCNC: 82 MG/DL (ref 70–99)
HCT VFR BLD AUTO: 39.4 % (ref 36–46)
HGB BLD-MCNC: 14 G/DL (ref 12–16)
LYMPHOCYTES NFR BLD: 2.5 K/UL (ref 1–4.8)
LYMPHOCYTES RELATIVE PERCENT: 35 % (ref 24–44)
MCH RBC QN AUTO: 33.2 PG (ref 26–34)
MCHC RBC AUTO-ENTMCNC: 35.4 G/DL (ref 31–37)
MCV RBC AUTO: 93.7 FL (ref 80–100)
MONOCYTES NFR BLD: 0.6 K/UL (ref 0.1–1.3)
MONOCYTES NFR BLD: 9 % (ref 1–7)
NEUTROPHILS NFR BLD: 50 % (ref 36–66)
NEUTS SEG NFR BLD: 3.7 K/UL (ref 1.3–9.1)
PLATELET # BLD AUTO: 377 K/UL (ref 150–450)
PMV BLD AUTO: 9.1 FL (ref 6–12)
POTASSIUM SERPL-SCNC: 4 MMOL/L (ref 3.7–5.3)
RBC # BLD AUTO: 4.2 M/UL (ref 4–5.2)
SODIUM SERPL-SCNC: 140 MMOL/L (ref 135–144)
WBC OTHER # BLD: 7.3 K/UL (ref 3.5–11)

## 2023-09-21 PROCEDURE — 85025 COMPLETE CBC W/AUTO DIFF WBC: CPT

## 2023-09-21 PROCEDURE — 80048 BASIC METABOLIC PNL TOTAL CA: CPT

## 2023-09-21 PROCEDURE — 36415 COLL VENOUS BLD VENIPUNCTURE: CPT

## 2023-09-21 NOTE — DISCHARGE INSTRUCTIONS
Pre-op Instructions For Out-Patient Surgery    Medication Instructions:  Please stop herbs and any supplements now (includes vitamins and minerals). Please contact your surgeon and prescribing physician for pre-op instructions for any blood thinners. Aspirin and Ibuprofen     If you have inhalers/aerosol treatments at home, please use them the morning of your surgery and bring the inhalers with you to the hospital.    Please take the following medications the morning of your surgery with a sip of water:    None     Surgery Instructions:  After midnight before surgery:  Do not eat or drink anything, including water, mints, gum, and hard candy. You may brush your teeth without swallowing. No smoking, chewing tobacco, or street drugs. Please shower or bathe before surgery. If you were given Surgical Scrub Chlorhexidine Gluconate Liquid (CHG), please shower the night before and the morning of your surgery following the detailed instructions you received during your pre-admission visit. Please do not wear any cologne, lotion, powder, deodorant, jewelry, piercings, perfume, makeup, nail polish, hair accessories, or hair spray on the day of surgery. Wear loose comfortable clothing. Leave your valuables at home but bring a payment source for any after-surgery prescriptions you plan to fill at [facility]. Bring a storage case for any glasses/contacts. An adult who is responsible for you MUST drive you home and should be with you for the first 24 hours after surgery. If having out-patient knee and foot surgeries, please arrange for planned crutches, walker, or wheelchair before arriving to the hospital.    The Day of Surgery:  Arrive at [facility] Surgery Entrance at the time directed by your surgeon and check in at the desk. If you have a living will or healthcare power of , please bring a copy.     You will be taken to the pre-op holding

## 2023-09-26 ENCOUNTER — OFFICE VISIT (OUTPATIENT)
Dept: PODIATRY | Age: 73
End: 2023-09-26
Payer: MEDICARE

## 2023-09-26 VITALS — BODY MASS INDEX: 28.85 KG/M2 | WEIGHT: 169 LBS | HEIGHT: 64 IN

## 2023-09-26 DIAGNOSIS — M79.604 PAIN IN BOTH LOWER EXTREMITIES: ICD-10-CM

## 2023-09-26 DIAGNOSIS — M79.605 PAIN IN BOTH LOWER EXTREMITIES: ICD-10-CM

## 2023-09-26 DIAGNOSIS — B35.1 DERMATOPHYTOSIS OF NAIL: Primary | ICD-10-CM

## 2023-09-26 DIAGNOSIS — I73.9 PVD (PERIPHERAL VASCULAR DISEASE) (HCC): ICD-10-CM

## 2023-09-26 PROCEDURE — 3017F COLORECTAL CA SCREEN DOC REV: CPT | Performed by: PODIATRIST

## 2023-09-26 PROCEDURE — 11721 DEBRIDE NAIL 6 OR MORE: CPT | Performed by: PODIATRIST

## 2023-09-26 PROCEDURE — 1036F TOBACCO NON-USER: CPT | Performed by: PODIATRIST

## 2023-09-26 PROCEDURE — G8399 PT W/DXA RESULTS DOCUMENT: HCPCS | Performed by: PODIATRIST

## 2023-09-26 PROCEDURE — G8417 CALC BMI ABV UP PARAM F/U: HCPCS | Performed by: PODIATRIST

## 2023-09-26 PROCEDURE — 1090F PRES/ABSN URINE INCON ASSESS: CPT | Performed by: PODIATRIST

## 2023-09-26 PROCEDURE — 1123F ACP DISCUSS/DSCN MKR DOCD: CPT | Performed by: PODIATRIST

## 2023-09-26 PROCEDURE — 99203 OFFICE O/P NEW LOW 30 MIN: CPT | Performed by: PODIATRIST

## 2023-09-26 PROCEDURE — G8427 DOCREV CUR MEDS BY ELIG CLIN: HCPCS | Performed by: PODIATRIST

## 2023-09-26 RX ORDER — FOLIC ACID 0.8 MG
TABLET ORAL
COMMUNITY

## 2023-09-26 NOTE — PROGRESS NOTES
debris. Fissures absent, Bilateral.       Asessment: Patient is a 68 y.o. female with:    Diagnosis Orders   1. Dermatophytosis of nail  80490 - [place] DEBRIDEMENT OF NAILS, 6 OR MORE      2. PVD (peripheral vascular disease) ([address])  [address]889 - ND DEBRIDEMENT OF NAILS, 6 OR MORE      3. Pain in both lower extremities  53561 - ND DEBRIDEMENT OF NAILS, 6 OR MORE            Plan: Patient examined and evaluated. Current condition and treatment options discussed in detail. Discussed conservative and surgical options with the patient. Nails 1,2,3,4,5 Right and 1,2,3,4,5 Left were debrided and ground smooth with a dremmel. The patient tolerated the procedure well without apparent complications. Rx: penlac to be applied once daily. All labs were reviewed and all imagining including the above findings were reviewed PRIOR to the patients arrival and with the patient today. Previous patient encounter was reviewed. Encounters from the patients other medical providers were reviewed and noted. I personally interpreted the imaging and labs and discussed the results with the patient Time was spent educating the patient and their families/caregivers on proper care of the feet and ankles. All the above diagnosis were addressed at todays visit and all questions were answered. A total of 30 minutes was spent with this patients encounter which included charting after the patients visit     Verbal and written instructions given to patient. Contact office with any questions/problems/concerns.   RTC in 3month(s).    9/26/2023    Electronically signed by Lalito Rivera DPM on 9/26/2023 at 10:34 AM  9/26/2023

## 2023-10-03 ENCOUNTER — TELEPHONE (OUTPATIENT)
Dept: ORTHOPEDIC SURGERY | Age: 73
End: 2023-10-03

## 2023-10-03 NOTE — TELEPHONE ENCOUNTER
Pt inquired about what time she needed to arrive for sx. Pt informed that R PMM and PLM sx with Dr. Levar Jett is scheduled for 9:45 am and she should consequently arrive 2 hours early at 7:45 am. Pt voices understanding.

## 2023-10-04 ENCOUNTER — ANESTHESIA EVENT (OUTPATIENT)
Dept: OPERATING ROOM | Age: 73
End: 2023-10-04
Payer: MEDICARE

## 2023-10-04 NOTE — PRE-PROCEDURE INSTRUCTIONS
No answer, left message ? Unable to leave message ? When were you told to arrive at hospital ? [address]    Do you have a  ? Yes, son to bring her home    Are you on any blood thinners ? Yes ASA                    If yes when did you stop taking ? 2 weeks ago    Do you have your prep Rx filled and instruction ? Nothing to eat the day before , only clear liquids. Are you experiencing any covid symptoms ? No    Do you have any infections or rash we should be aware of ? No      Do you have the Hibiclens soap to use the night before and the morning of surgery ? Yes    Nothing to eat or drink after midnight, only a sip of water to take any medication instructed to take the night before. Wear comfortable clothing, leave any valuables at home, remove any jewelry and body piercing .

## 2023-10-05 ENCOUNTER — HOSPITAL ENCOUNTER (OUTPATIENT)
Age: 73
Setting detail: OUTPATIENT SURGERY
Discharge: HOME | End: 2023-10-05
Attending: ORTHOPAEDIC SURGERY | Admitting: ORTHOPAEDIC SURGERY
Payer: MEDICARE

## 2023-10-05 ENCOUNTER — TELEPHONE (OUTPATIENT)
Dept: INFUSION THERAPY | Age: 73
End: 2023-10-05

## 2023-10-05 ENCOUNTER — ANESTHESIA (OUTPATIENT)
Dept: OPERATING ROOM | Age: 73
End: 2023-10-05
Payer: MEDICARE

## 2023-10-05 VITALS
WEIGHT: 169 LBS | HEIGHT: 60 IN | TEMPERATURE: 96.8 F | DIASTOLIC BLOOD PRESSURE: 71 MMHG | OXYGEN SATURATION: 99 % | BODY MASS INDEX: 33.18 KG/M2 | RESPIRATION RATE: 12 BRPM | HEART RATE: 45 BPM | SYSTOLIC BLOOD PRESSURE: 132 MMHG

## 2023-10-05 DIAGNOSIS — S83.241A ACUTE MEDIAL MENISCUS TEAR OF RIGHT KNEE, INITIAL ENCOUNTER: Primary | ICD-10-CM

## 2023-10-05 PROCEDURE — 7100000031 HC ASPR PHASE II RECOVERY - ADDTL 15 MIN: Performed by: ORTHOPAEDIC SURGERY

## 2023-10-05 PROCEDURE — 7100000001 HC PACU RECOVERY - ADDTL 15 MIN: Performed by: ORTHOPAEDIC SURGERY

## 2023-10-05 PROCEDURE — 6360000002 HC RX W HCPCS: Performed by: ANESTHESIOLOGY

## 2023-10-05 PROCEDURE — 7100000011 HC PHASE II RECOVERY - ADDTL 15 MIN: Performed by: ORTHOPAEDIC SURGERY

## 2023-10-05 PROCEDURE — 3700000001 HC ADD 15 MINUTES (ANESTHESIA): Performed by: ORTHOPAEDIC SURGERY

## 2023-10-05 PROCEDURE — 2580000003 HC RX 258: Performed by: ANESTHESIOLOGY

## 2023-10-05 PROCEDURE — 3600000003 HC SURGERY LEVEL 3 BASE: Performed by: ORTHOPAEDIC SURGERY

## 2023-10-05 PROCEDURE — 3700000000 HC ANESTHESIA ATTENDED CARE: Performed by: ORTHOPAEDIC SURGERY

## 2023-10-05 PROCEDURE — 3600000013 HC SURGERY LEVEL 3 ADDTL 15MIN: Performed by: ORTHOPAEDIC SURGERY

## 2023-10-05 PROCEDURE — 6360000002 HC RX W HCPCS: Performed by: ORTHOPAEDIC SURGERY

## 2023-10-05 PROCEDURE — 6370000000 HC RX 637 (ALT 250 FOR IP): Performed by: ANESTHESIOLOGY

## 2023-10-05 PROCEDURE — 7100000030 HC ASPR PHASE II RECOVERY - FIRST 15 MIN: Performed by: ORTHOPAEDIC SURGERY

## 2023-10-05 PROCEDURE — 7100000010 HC PHASE II RECOVERY - FIRST 15 MIN: Performed by: ORTHOPAEDIC SURGERY

## 2023-10-05 PROCEDURE — 6360000002 HC RX W HCPCS: Performed by: NURSE ANESTHETIST, CERTIFIED REGISTERED

## 2023-10-05 PROCEDURE — 2709999900 HC NON-CHARGEABLE SUPPLY: Performed by: ORTHOPAEDIC SURGERY

## 2023-10-05 PROCEDURE — 2500000003 HC RX 250 WO HCPCS: Performed by: NURSE ANESTHETIST, CERTIFIED REGISTERED

## 2023-10-05 PROCEDURE — 7100000000 HC PACU RECOVERY - FIRST 15 MIN: Performed by: ORTHOPAEDIC SURGERY

## 2023-10-05 RX ORDER — ROPIVACAINE HYDROCHLORIDE 5 MG/ML
INJECTION, SOLUTION EPIDURAL; INFILTRATION; PERINEURAL PRN
Status: DISCONTINUED | OUTPATIENT
Start: 2023-10-05 | End: 2023-10-05 | Stop reason: ALTCHOICE

## 2023-10-05 RX ORDER — SODIUM CHLORIDE 0.9 % (FLUSH) 0.9 %
5-40 SYRINGE (ML) INJECTION EVERY 12 HOURS SCHEDULED
Status: DISCONTINUED | OUTPATIENT
Start: 2023-10-05 | End: 2023-10-05 | Stop reason: HOSPADM

## 2023-10-05 RX ORDER — SODIUM CHLORIDE, SODIUM LACTATE, POTASSIUM CHLORIDE, CALCIUM CHLORIDE 600; 310; 30; 20 MG/100ML; MG/100ML; MG/100ML; MG/100ML
INJECTION, SOLUTION INTRAVENOUS CONTINUOUS
Status: DISCONTINUED | OUTPATIENT
Start: 2023-10-05 | End: 2023-10-05 | Stop reason: HOSPADM

## 2023-10-05 RX ORDER — HYDRALAZINE HYDROCHLORIDE 20 MG/ML
10 INJECTION INTRAMUSCULAR; INTRAVENOUS
Status: DISCONTINUED | OUTPATIENT
Start: 2023-10-05 | End: 2023-10-05 | Stop reason: HOSPADM

## 2023-10-05 RX ORDER — ONDANSETRON 2 MG/ML
4 INJECTION INTRAMUSCULAR; INTRAVENOUS
Status: COMPLETED | OUTPATIENT
Start: 2023-10-05 | End: 2023-10-05

## 2023-10-05 RX ORDER — FENTANYL CITRATE 0.05 MG/ML
25 INJECTION, SOLUTION INTRAMUSCULAR; INTRAVENOUS EVERY 5 MIN PRN
Status: DISCONTINUED | OUTPATIENT
Start: 2023-10-05 | End: 2023-10-05 | Stop reason: HOSPADM

## 2023-10-05 RX ORDER — LABETALOL HYDROCHLORIDE 5 MG/ML
10 INJECTION, SOLUTION INTRAVENOUS
Status: DISCONTINUED | OUTPATIENT
Start: 2023-10-05 | End: 2023-10-05 | Stop reason: HOSPADM

## 2023-10-05 RX ORDER — DIPHENHYDRAMINE HYDROCHLORIDE 50 MG/ML
12.5 INJECTION INTRAMUSCULAR; INTRAVENOUS
Status: DISCONTINUED | OUTPATIENT
Start: 2023-10-05 | End: 2023-10-05 | Stop reason: HOSPADM

## 2023-10-05 RX ORDER — DEXAMETHASONE SODIUM PHOSPHATE 4 MG/ML
INJECTION, SOLUTION INTRA-ARTICULAR; INTRALESIONAL; INTRAMUSCULAR; INTRAVENOUS; SOFT TISSUE PRN
Status: DISCONTINUED | OUTPATIENT
Start: 2023-10-05 | End: 2023-10-05 | Stop reason: SDUPTHER

## 2023-10-05 RX ORDER — ACETAMINOPHEN 500 MG
1000 TABLET ORAL ONCE
Status: DISCONTINUED | OUTPATIENT
Start: 2023-10-05 | End: 2023-10-05 | Stop reason: HOSPADM

## 2023-10-05 RX ORDER — PROPOFOL 10 MG/ML
INJECTION, EMULSION INTRAVENOUS PRN
Status: DISCONTINUED | OUTPATIENT
Start: 2023-10-05 | End: 2023-10-05 | Stop reason: SDUPTHER

## 2023-10-05 RX ORDER — SODIUM CHLORIDE 0.9 % (FLUSH) 0.9 %
5-40 SYRINGE (ML) INJECTION PRN
Status: DISCONTINUED | OUTPATIENT
Start: 2023-10-05 | End: 2023-10-05 | Stop reason: HOSPADM

## 2023-10-05 RX ORDER — KETOROLAC TROMETHAMINE 30 MG/ML
INJECTION, SOLUTION INTRAMUSCULAR; INTRAVENOUS PRN
Status: DISCONTINUED | OUTPATIENT
Start: 2023-10-05 | End: 2023-10-05 | Stop reason: SDUPTHER

## 2023-10-05 RX ORDER — HYDROCODONE BITARTRATE AND ACETAMINOPHEN 5; 325 MG/1; MG/1
1 TABLET ORAL EVERY 6 HOURS PRN
Qty: 20 TABLET | Refills: 0 | Status: SHIPPED | OUTPATIENT
Start: 2023-10-05 | End: 2023-10-10

## 2023-10-05 RX ORDER — LIDOCAINE HYDROCHLORIDE 10 MG/ML
1 INJECTION, SOLUTION EPIDURAL; INFILTRATION; INTRACAUDAL; PERINEURAL
Status: DISCONTINUED | OUTPATIENT
Start: 2023-10-05 | End: 2023-10-05 | Stop reason: HOSPADM

## 2023-10-05 RX ORDER — SODIUM CHLORIDE 9 MG/ML
INJECTION, SOLUTION INTRAVENOUS PRN
Status: DISCONTINUED | OUTPATIENT
Start: 2023-10-05 | End: 2023-10-05 | Stop reason: HOSPADM

## 2023-10-05 RX ORDER — LIDOCAINE HYDROCHLORIDE 20 MG/ML
INJECTION, SOLUTION EPIDURAL; INFILTRATION; INTRACAUDAL; PERINEURAL PRN
Status: DISCONTINUED | OUTPATIENT
Start: 2023-10-05 | End: 2023-10-05 | Stop reason: SDUPTHER

## 2023-10-05 RX ORDER — ACETAMINOPHEN 325 MG/1
650 TABLET ORAL
Status: DISCONTINUED | OUTPATIENT
Start: 2023-10-05 | End: 2023-10-05 | Stop reason: HOSPADM

## 2023-10-05 RX ORDER — HYDROCODONE BITARTRATE AND ACETAMINOPHEN 5; 325 MG/1; MG/1
1 TABLET ORAL ONCE
Status: COMPLETED | OUTPATIENT
Start: 2023-10-05 | End: 2023-10-05

## 2023-10-05 RX ORDER — MEPERIDINE HYDROCHLORIDE 25 MG/ML
12.5 INJECTION INTRAMUSCULAR; INTRAVENOUS; SUBCUTANEOUS EVERY 5 MIN PRN
Status: DISCONTINUED | OUTPATIENT
Start: 2023-10-05 | End: 2023-10-05 | Stop reason: HOSPADM

## 2023-10-05 RX ORDER — GABAPENTIN 100 MG/1
100 CAPSULE ORAL ONCE
Status: COMPLETED | OUTPATIENT
Start: 2023-10-05 | End: 2023-10-05

## 2023-10-05 RX ORDER — FENTANYL CITRATE 50 UG/ML
INJECTION, SOLUTION INTRAMUSCULAR; INTRAVENOUS PRN
Status: DISCONTINUED | OUTPATIENT
Start: 2023-10-05 | End: 2023-10-05 | Stop reason: SDUPTHER

## 2023-10-05 RX ORDER — METOCLOPRAMIDE HYDROCHLORIDE 5 MG/ML
10 INJECTION INTRAMUSCULAR; INTRAVENOUS
Status: COMPLETED | OUTPATIENT
Start: 2023-10-05 | End: 2023-10-05

## 2023-10-05 RX ORDER — ONDANSETRON 2 MG/ML
INJECTION INTRAMUSCULAR; INTRAVENOUS PRN
Status: DISCONTINUED | OUTPATIENT
Start: 2023-10-05 | End: 2023-10-05 | Stop reason: SDUPTHER

## 2023-10-05 RX ADMIN — SODIUM CHLORIDE, POTASSIUM CHLORIDE, SODIUM LACTATE AND CALCIUM CHLORIDE: 600; 310; 30; 20 INJECTION, SOLUTION INTRAVENOUS at 09:01

## 2023-10-05 RX ADMIN — HYDROCODONE BITARTRATE AND ACETAMINOPHEN 1 TABLET: 5; 325 TABLET ORAL at 11:06

## 2023-10-05 RX ADMIN — GABAPENTIN 100 MG: 100 CAPSULE ORAL at 08:39

## 2023-10-05 RX ADMIN — KETOROLAC TROMETHAMINE 15 MG: 30 INJECTION, SOLUTION INTRAMUSCULAR; INTRAVENOUS at 09:19

## 2023-10-05 RX ADMIN — LIDOCAINE HYDROCHLORIDE 80 MG: 20 INJECTION, SOLUTION EPIDURAL; INFILTRATION; INTRACAUDAL; PERINEURAL at 09:12

## 2023-10-05 RX ADMIN — DEXAMETHASONE SODIUM PHOSPHATE 8 MG: 4 INJECTION INTRA-ARTICULAR; INTRALESIONAL; INTRAMUSCULAR; INTRAVENOUS; SOFT TISSUE at 09:15

## 2023-10-05 RX ADMIN — METOCLOPRAMIDE 10 MG: 5 INJECTION, SOLUTION INTRAMUSCULAR; INTRAVENOUS at 11:00

## 2023-10-05 RX ADMIN — ONDANSETRON 4 MG: 2 INJECTION INTRAMUSCULAR; INTRAVENOUS at 09:15

## 2023-10-05 RX ADMIN — FENTANYL CITRATE 25 MCG: 50 INJECTION, SOLUTION INTRAMUSCULAR; INTRAVENOUS at 09:12

## 2023-10-05 RX ADMIN — ONDANSETRON 4 MG: 2 INJECTION INTRAMUSCULAR; INTRAVENOUS at 10:48

## 2023-10-05 RX ADMIN — HYDROMORPHONE HYDROCHLORIDE 0.5 MG: 1 INJECTION, SOLUTION INTRAMUSCULAR; INTRAVENOUS; SUBCUTANEOUS at 09:59

## 2023-10-05 RX ADMIN — PROPOFOL 50 MG: 10 INJECTION, EMULSION INTRAVENOUS at 09:13

## 2023-10-05 RX ADMIN — PROPOFOL 150 MG: 10 INJECTION, EMULSION INTRAVENOUS at 09:12

## 2023-10-05 ASSESSMENT — PAIN SCALES - GENERAL
PAINLEVEL_OUTOF10: 7
PAINLEVEL_OUTOF10: 5
PAINLEVEL_OUTOF10: 6
PAINLEVEL_OUTOF10: 5

## 2023-10-05 ASSESSMENT — PAIN - FUNCTIONAL ASSESSMENT
PAIN_FUNCTIONAL_ASSESSMENT: PREVENTS OR INTERFERES SOME ACTIVE ACTIVITIES AND ADLS
PAIN_FUNCTIONAL_ASSESSMENT: 0-10

## 2023-10-05 ASSESSMENT — ENCOUNTER SYMPTOMS
DYSPNEA ACTIVITY LEVEL: NO INTERVAL CHANGE
STRIDOR: 0
SHORTNESS OF BREATH: 1

## 2023-10-05 ASSESSMENT — PAIN DESCRIPTION - FREQUENCY: FREQUENCY: CONTINUOUS

## 2023-10-05 ASSESSMENT — PAIN DESCRIPTION - ORIENTATION: ORIENTATION: RIGHT

## 2023-10-05 ASSESSMENT — PAIN DESCRIPTION - DESCRIPTORS
DESCRIPTORS: ACHING

## 2023-10-05 ASSESSMENT — PAIN DESCRIPTION - LOCATION
LOCATION: KNEE
LOCATION: HEAD

## 2023-10-05 ASSESSMENT — PAIN DESCRIPTION - PAIN TYPE: TYPE: SURGICAL PAIN

## 2023-10-05 ASSESSMENT — PAIN DESCRIPTION - ONSET: ONSET: AWAKENED FROM SLEEP

## 2023-10-05 ASSESSMENT — LIFESTYLE VARIABLES: SMOKING_STATUS: 0

## 2023-10-05 NOTE — ANESTHESIA PRE PROCEDURE
PE.      Other Findings:           Anesthesia Plan      general     ASA 2       Induction: intravenous. MIPS: Postoperative opioids intended and Prophylactic antiemetics administered. Anesthetic plan and risks discussed with patient. Plan discussed with CRNA.                     Yrn Ann MD   10/5/2023

## 2023-10-05 NOTE — OP NOTE
Operative Note      Patient: Nikky Kinsey  YOB: 1950  MRN: [id number]    Date of Procedure: 10/5/2023    Preoperative diagnosis: Medial and lateral meniscus tears right knee    Post-Op Diagnosis: Same       Procedure(s):  KNEE ARTHROSCOPY RIGHT  PARTIAL MEDIAL AND RIGHT PARTIAL LATERAL MENISCECTOMIES    Surgeon(s): Angie Dias MD    Assistant:   Resident: Felix Wheat DO    Anesthesia: General    Estimated Blood Loss (mL): Minimal    Complications: None    Specimens:   * No specimens in log *    Implants:  * No implants in log *      Drains: * No LDAs found *    Findings: Complex tear of the posterior horn of the medial meniscus with grade III chondromalacia medial compartment. Tears of the posterior and anterior horns of the lateral meniscus        Detailed Description of Procedure:       Patient is a 68y.o. year old female who presents with a history of pain, locking, and giving away sensations of their right knee. Physical examination was positive for Tri's examination. MRI was consistent with tears of the medial and lateral meniscus. Having failed conservative treatment, it was advised that arthroscopic examination and treatment of their knee would be beneficial and consent was obtained with risk and benefits explained to the patient. The patient was taken tot he operative room and general anesthesia was administered. A tourniquet was placed to the operatives lower extremity and then placed in the leg watts. The leg was exsanguinated and the tourniquet inflated to the 300mmHg. The leg was the prepped and draped in the usual sterile fashion. Time-out was called to verify laterality. Medial and lateral portals were made and the scope placed in the lateral portal. The patella femoral joint was examined and noted grade 2 early grade III chondromalacia but nothing required intervention. . The scope was then passes down the medial gutter into the medial compartment.  A probe was used to

## 2023-10-05 NOTE — H&P
locking up of the knee. No recent falls or trauma. Patient has not had to use assistive device. Patient denies any extreme  joint warmth, redness in association to infection. Denies any  fever or chills. Medical history reviewed. Pt denies any recent chest pain/pressure. Patient has hx of palpitation  and low heart rate. Patient has had recent holter monitor on due to her HR of 47. Pt was seen by her Cardiologist, Dr. Dominga Munroe. Note per Dr. Martines Copping monitor June 2023:  Patient in sinus rhythm with overall bradycardic heart rate as suggested by average heart of 54 beats per minute minimum heart was 40 and maximum 91 beats per minute  There were 339 PACs and 13 PVCs. Patient activated monitor 3 times only once reported symptoms of lightheaded/sitting at the time was in sinus bradycardia heart rate at 55 beats per minute. Other 2 patient activated event without symptom patient was in sinus with heart rate in 60s or 70s. No evidence of any sustained arrhythmias or heart block. Pt reports some SOB. No recent URI . Anticoagulants or blood thinners:  aspirin, vitamins    Pt denies any hx of blood clots  Patient has been NPO since midnight. No blood thinners in the past 14 days. Did not take any meds. Patient denies any personal or family problems with anesthesia. Xrays or Imaging:   MRI OF THE RIGHT KNEE WITHOUT CONTRAST, 8/30/2023   IMPRESSION:  1. Complex multidirectional tearing of the medial meniscus with outward  extrusion. 2. Complex multidirectional tearing and volume loss of the entirety of the  lateral meniscus. 3. Mild ACL ganglion formation. 4. Low-grade partial-thickness interstitial tearing of the distal quadriceps  tendon. Mild patellar tendinosis. 5. Moderate tricompartmental osteoarthrosis. Moderate-to-severe medial and  patellar chondromalacia. Mild-to-moderate lateral and femoral trochlear  chondromalacia. 6. Small joint effusion.   Moderate complex

## 2023-10-05 NOTE — PROGRESS NOTES
CLINICAL PHARMACY NOTE: MEDS TO BEDS    Total # of Prescriptions Filled: 2   The following medications were delivered to the patient:  Mucus relief 600mg  Hydrocodone- acetaminophen5-325mg    Additional Documentation:10/05/23 1100Patient is Eligible to Utilize Meds To Beds for Their Discharge Medications Delivered medications to family in surgery wait room -

## 2023-10-18 ENCOUNTER — OFFICE VISIT (OUTPATIENT)
Dept: ORTHOPEDIC SURGERY | Age: 73
End: 2023-10-18

## 2023-10-18 DIAGNOSIS — Z98.890 S/P RIGHT KNEE ARTHROSCOPY: Primary | ICD-10-CM

## 2023-10-18 PROCEDURE — 99024 POSTOP FOLLOW-UP VISIT: CPT | Performed by: ORTHOPAEDIC SURGERY

## 2023-10-18 NOTE — PROGRESS NOTES
Patient returns today status post right knee arthroscopy with partial (medial/lateral) meniscectomy. Patient has no major complaints other than expected tightness/swelling with ROM. Sharp/stabbing pain has improved. On exam, portal sites are without redness or drainage. No calf tenderness; negative Anjel's sign. Motion is 0-100 degrees. No significant effusion. Assessment  Status post right knee arthroscopy    Plan  Patient given exercises to perform. Patient given activities/ motions to complete. Continue activities at home. Return to work. RTO PRN. Call with any future problems.

## 2023-12-21 ENCOUNTER — OFFICE VISIT (OUTPATIENT)
Dept: PODIATRY | Age: 73
End: 2023-12-21
Payer: MEDICARE

## 2023-12-21 VITALS — HEIGHT: 64 IN | BODY MASS INDEX: 28.85 KG/M2 | WEIGHT: 169 LBS

## 2023-12-21 DIAGNOSIS — M79.604 PAIN IN BOTH LOWER EXTREMITIES: ICD-10-CM

## 2023-12-21 DIAGNOSIS — I73.9 PVD (PERIPHERAL VASCULAR DISEASE) (HCC): ICD-10-CM

## 2023-12-21 DIAGNOSIS — M79.605 PAIN IN BOTH LOWER EXTREMITIES: ICD-10-CM

## 2023-12-21 DIAGNOSIS — B35.1 DERMATOPHYTOSIS OF NAIL: Primary | ICD-10-CM

## 2023-12-21 PROCEDURE — 11721 DEBRIDE NAIL 6 OR MORE: CPT | Performed by: PODIATRIST

## 2023-12-21 PROCEDURE — 99999 PR OFFICE/OUTPT VISIT,PROCEDURE ONLY: CPT | Performed by: PODIATRIST

## 2023-12-21 NOTE — PROGRESS NOTES
[address] [facility]  Dept: [phone]     PAIN PROGRESS NOTE  Date of patient's visit: 12/21/2023  Patient's Name:  Julia Dixon YOB: 1950            Patient Care Team:  Belen Mcmillan MD as PCP - General (Family Medicine)  Belen Mcmillan MD as PCP [facility]  Kath Angelo DO as Consulting Physician (General Surgery)  Isaac Thompson MD as Surgeon (Radiation Oncology)  Raheel Castillo MD as Consulting Physician (Internal Medicine Cardiovascular Disease)  Don Moran MD as Consulting Physician (Hematology and Oncology)  Yonas Rodrigues MD as Consulting Physician (Gastroenterology)      Chief Complaint   Patient presents with    Nail Problem     Toenail trim    Foot Pain     Bl feet       Subjective: This Julia Dixon comes to clinic for foot and nail care. Pt currently has complaint of thickened, painful, elongated nails that he/she cannot manage by themselves. Pt. Relates pain to nails with shoe gear. Pt's primary care physician is Amalia Yen  last seen September 20 2023. Past Medical History:   Diagnosis Date    Abnormal EKG 12/2015    Chest pain     rare    Diverticulosis     History of bilateral breast cancer 10/2015    Primary biliary cholangitis (HCC)     Sees Dr. Alanna Espinal of breath on exertion     Urinary frequency     Wears glasses        Allergies   Allergen Reactions    Ciprofloxacin Hcl Itching     tendonitis    Coffee Bean Extract [Coffea Arabica] Nausea Only    Darvon [Propoxyphene] Other (See Comments)     Patient relates it increased pain rather than decreased pain.     Dust Mite Extract      Other reaction(s): Unknown    Levaquin [Levofloxacin In D5w] Other (See Comments)     tendonitits    Mixed Grasses      Other reaction(s): Unknown    Molds & Smuts      Other reaction(s): Unknown    Other Other (See

## 2024-03-20 ENCOUNTER — TELEPHONE (OUTPATIENT)
Dept: GASTROENTEROLOGY | Age: 74
End: 2024-03-20

## 2024-03-20 PROBLEM — K74.60 HEPATIC CIRRHOSIS, UNSPECIFIED HEPATIC CIRRHOSIS TYPE, UNSPECIFIED WHETHER ASCITES PRESENT (HCC): Status: ACTIVE | Noted: 2024-03-20

## 2024-03-20 PROBLEM — K74.3 PRIMARY BILIARY CIRRHOSIS (HCC): Status: ACTIVE | Noted: 2024-03-20

## 2024-03-21 ENCOUNTER — OFFICE VISIT (OUTPATIENT)
Dept: PODIATRY | Age: 74
End: 2024-03-21
Payer: MEDICARE

## 2024-03-21 VITALS — HEIGHT: 64 IN | WEIGHT: 176 LBS | BODY MASS INDEX: 30.05 KG/M2

## 2024-03-21 DIAGNOSIS — B35.1 DERMATOPHYTOSIS OF NAIL: Primary | ICD-10-CM

## 2024-03-21 DIAGNOSIS — M79.605 PAIN IN BOTH LOWER EXTREMITIES: ICD-10-CM

## 2024-03-21 DIAGNOSIS — I73.9 PVD (PERIPHERAL VASCULAR DISEASE) (HCC): ICD-10-CM

## 2024-03-21 DIAGNOSIS — M79.604 PAIN IN BOTH LOWER EXTREMITIES: ICD-10-CM

## 2024-03-21 PROCEDURE — 11721 DEBRIDE NAIL 6 OR MORE: CPT | Performed by: PODIATRIST

## 2024-03-21 NOTE — PROGRESS NOTES
to visit.     Review of Systems.    Review of Systems:   History obtained from chart review and the patient  General ROS: negative for - chills, fatigue, fever, night sweats or weight gain  Constitutional: Negative for chills, diaphoresis, fatigue, fever and unexpected weight change.  Musculoskeletal: Positive for arthralgias, gait problem and joint swelling.  Neurological ROS: negative for - behavioral changes, confusion, headaches or seizures. Negative for weakness and numbness.   Dermatological ROS: negative for - mole changes, rash  Cardiovascular: Negative for leg swelling.   Gastrointestinal: Negative for constipation, diarrhea, nausea and vomiting.          Objective:  Dermatologic Exam:  Skin lesion/ulceration Absent .   Skin No rashes or nodules noted..   Skin is thin, with flaky sloughing skin as well as decreased hair growth to the lower leg  Small red hemosiderin deposits seen dorsal foot   Musculoskeletal:     1st MPJ ROM decreased, Bilateral.  Muscle strength 5/5, Bilateral.  Pain present upon palpation of toenails 1-5, Bilateral. decreased medial longitudinal arch, Bilateral.  Ankle ROM decreased,Bilateral.    Dorsally contracted digits present digits 2, Bilateral.     Vascular: DP pulses 1/4 bilateral.  PT pulses 0/4 bilateral.   CFT <5 seconds, Bilateral.  Hair growth absent to the level of the digits, Bilateral.  Edema present, Bilateral.  Varicosities absent, Bilateral. Erythema absent, Bilateral    Neurological: Sensation diminshed to light touch to level of digits, Bilateral.  Protective sensation intact 6/10 sites via 5.07/10g [place]-Johnathan Monofilament, Bilateral.  negative Tinel's, Bilateral.  negative Valleix sign, Bilateral.      Integument: Warm, dry, supple, Bilateral.  Open lesion absent, Bilateral.  Interdigital maceration absent to web spaces 4, Bilateral.  Nails 1-5 left and 1-5 right thickened > 3.0 mm, dystrophic and crumbly, discolored with subungual debris.  Fissures absent,

## 2024-03-22 ENCOUNTER — HOSPITAL ENCOUNTER (OUTPATIENT)
Age: 74
Discharge: HOME | End: 2024-03-22
Payer: MEDICARE

## 2024-03-22 DIAGNOSIS — E78.5 DYSLIPIDEMIA: ICD-10-CM

## 2024-03-22 LAB
CHOLEST SERPL-MCNC: 227 MG/DL
CHOLESTEROL/HDL RATIO: 3
HDLC SERPL-MCNC: 75 MG/DL
LDLC SERPL CALC-MCNC: 134 MG/DL (ref 0–130)
TRIGL SERPL-MCNC: 88 MG/DL

## 2024-03-22 PROCEDURE — 80061 LIPID PANEL: CPT

## 2024-03-22 PROCEDURE — 36415 COLL VENOUS BLD VENIPUNCTURE: CPT

## 2024-03-28 ENCOUNTER — OFFICE VISIT (OUTPATIENT)
Dept: ONCOLOGY | Age: 74
End: 2024-03-28
Payer: MEDICARE

## 2024-03-28 ENCOUNTER — TELEPHONE (OUTPATIENT)
Dept: ONCOLOGY | Age: 74
End: 2024-03-28

## 2024-03-28 VITALS
SYSTOLIC BLOOD PRESSURE: 126 MMHG | BODY MASS INDEX: 30.16 KG/M2 | HEART RATE: 57 BPM | RESPIRATION RATE: 18 BRPM | DIASTOLIC BLOOD PRESSURE: 80 MMHG | TEMPERATURE: 97.8 F | WEIGHT: 175.7 LBS | OXYGEN SATURATION: 97 %

## 2024-03-28 DIAGNOSIS — C50.212 MALIGNANT NEOPLASM OF UPPER-INNER QUADRANT OF LEFT FEMALE BREAST, UNSPECIFIED ESTROGEN RECEPTOR STATUS (HCC): Primary | ICD-10-CM

## 2024-03-28 DIAGNOSIS — E78.5 DYSLIPIDEMIA: ICD-10-CM

## 2024-03-28 PROCEDURE — 99213 OFFICE O/P EST LOW 20 MIN: CPT | Performed by: INTERNAL MEDICINE

## 2024-03-28 PROCEDURE — G8427 DOCREV CUR MEDS BY ELIG CLIN: HCPCS | Performed by: INTERNAL MEDICINE

## 2024-03-28 PROCEDURE — 1123F ACP DISCUSS/DSCN MKR DOCD: CPT | Performed by: INTERNAL MEDICINE

## 2024-03-28 PROCEDURE — G8417 CALC BMI ABV UP PARAM F/U: HCPCS | Performed by: INTERNAL MEDICINE

## 2024-03-28 PROCEDURE — 1090F PRES/ABSN URINE INCON ASSESS: CPT | Performed by: INTERNAL MEDICINE

## 2024-03-28 PROCEDURE — 3017F COLORECTAL CA SCREEN DOC REV: CPT | Performed by: INTERNAL MEDICINE

## 2024-03-28 PROCEDURE — 99211 OFF/OP EST MAY X REQ PHY/QHP: CPT | Performed by: INTERNAL MEDICINE

## 2024-03-28 PROCEDURE — G8399 PT W/DXA RESULTS DOCUMENT: HCPCS | Performed by: INTERNAL MEDICINE

## 2024-03-28 PROCEDURE — 1036F TOBACCO NON-USER: CPT | Performed by: INTERNAL MEDICINE

## 2024-03-28 PROCEDURE — G8484 FLU IMMUNIZE NO ADMIN: HCPCS | Performed by: INTERNAL MEDICINE

## 2024-03-28 NOTE — TELEPHONE ENCOUNTER
RV 1 year , with cbc, cmp lipid panel     Pt will have labs drawn one week prior to RV    Proovider's schedule is not built for 2025. Office will call pt in December to schedule March 2025 f/u.    PT was given AVS and appt schedule    Electronically signed by Rosemary Franco on 3/28/2024 at 11:54 AM

## 2024-03-28 NOTE — PROGRESS NOTES
improving  She has completed all AI and remains asymptomatic.   Examination showed no evidence of local recurrence, she remains in remission  We will continue to see her annually and intervene if there is any need             Fuentes Schroeder MD  Hematologist/Medical Oncologist  [facility]

## 2024-04-18 ENCOUNTER — TELEPHONE (OUTPATIENT)
Dept: GASTROENTEROLOGY | Age: 74
End: 2024-04-18

## 2024-04-18 NOTE — TELEPHONE ENCOUNTER
Patient called requesting to schedule appointment after US scheduled on 7/22. Pre service not able to accommodate. Please call to discuss      Thank you

## 2024-05-06 NOTE — PROGRESS NOTES
DIAGNOSIS:   1. Stage I (T1b,N0,M0) ER/NJ+ , HER2 -ve , invasive breast cancer in upper outer quadrant of the left breast.  2. 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 Bilateral mastectomy done 12/16/15  2. Trial of arimidex. Stopped due to nausea and vomiting. Resumed later without major problems. BRIEF CASE HISTORY:   Ms. Judy Granado is a very pleasant 78 y.o. female who underwent screening mammogram on 9/21/15 , it showed focal asymmetry in the upper outer quadrant of the left breast that was suspicious. Diagnostic mammogram was done on 9/29/15, showed highly suspicious mass at 2:00 position in, upper outer quadrant of the left breast.  There was another area of architectural distortion that was also concerning. Stereotactic biopsy was done on 10/9/15 and that showed invasive grade 2 ductal carcinoma of the breast, the tumor was ER/NJ positive, unfortunately, there was not enough tissue to test for HER-2  Patient was sent to us for further discussion, she was very interested in genetic testing based on family history, while BRCA was negative. But showing VUS in MUTYH. Despite negative testing, the patient was still interested in bilateral mastectomy. Surgery was done on 12/16/15. Pathology showed 8mm invasive ductal carcinoma in the upper outer quadrant of the left breast , grade 2. The margins were clear. After surgery , we recommended adjuvant aromatase inhibitor with arimidex. Bilateral implants 2016, nipple reconstruction 2016. INTERIM HISTORY: Patient is in today for follow up. She continues to take Arimidex and has no negative side effects. She is taking calcium and vitamin D supplementation. GYNECOLOGICAL HISTORY  Menarche at age 7. Menaupause at age 40. +1. Age at first full term pregnancy 25. No use of HRT. PAST MEDICAL HISTORY: has a past medical history of Chest pain; History of bilateral breast cancer;  Short of breath on exertion; and Urinary frequency. PAST SURGICAL HISTORY: has past surgical history that includes Splenectomy (N/A); Appendectomy; Colon surgery; Hysterectomy; MONA/BSO    CURRENT MEDICATIONS:  has a current medication list which includes the following prescription(s): anastrozole, nato root, phytonadione, NONFORMULARY, multiple vitamins-minerals, fexofenadine, ibuprofen, aspirin, calcium & magnesium carbonates, NONFORMULARY, multiple vitamins-minerals, psyllium, choline, NONFORMULARY, and omega-3 fatty acids. ALLERGIES:  is allergic to ciprofloxacin hcl; darvon [propoxyphene]; levaquin [levofloxacin in d5w]; other; and penicillins. FAMILY HISTORY: There is an history of breast cancer in her sister in her 46s, maternal aunt in her 76s and 2 cousins in their 46s. SOCIAL HISTORY:  reports that she has never smoked. She does not have any smokeless tobacco history on file. She reports that she does not drink alcohol or use drugs. REVIEW OF SYSTEMS:     General: no fever or night sweats, Weight is stable, some weight distribution. Grade 1 fatigue. Mild hot flashes  ENT: No double or blurred vision, no tinnitus or hearing problem, no dysphagia or sore throat   Respiratory: No chest pain, no shortness of breath, no cough or hemoptysis. Cardiovascular: Denies chest pain, PND or orthopnea. No L E swelling or palpitations. Gastrointestinal: No nausea or vomiting, abdominal pain, diarrhea or constipation. Genitourinary: Denies dysuria, hematuria, frequency, urgency or incontinence. Neurological: Denies headaches, decreased LOC, no sensory or motor focal deficits. Musculoskeletal: no joint swelling. Muscle and joint pain - intermittent. Skin: There are no rashes or bleeding. Psychiatric:  No anxiety, no depression. Insomnia   Endocrine: no diabetes or thyroid disease. Hematologic: no bleeding , no adenopathy. PHYSICAL EXAM:  The patient is not in acute distress.   Vital signs: Blood pressure 129/67, pulse 52, 06-May-2024 11:00

## 2024-06-12 ENCOUNTER — HOSPITAL ENCOUNTER (INPATIENT)
Age: 74
LOS: 3 days | Discharge: HOME | DRG: 041 | End: 2024-06-15
Attending: EMERGENCY MEDICINE | Admitting: PSYCHIATRY & NEUROLOGY
Payer: MEDICARE

## 2024-06-12 ENCOUNTER — APPOINTMENT (OUTPATIENT)
Dept: CT IMAGING | Age: 74
DRG: 041 | End: 2024-06-12
Payer: MEDICARE

## 2024-06-12 ENCOUNTER — APPOINTMENT (OUTPATIENT)
Dept: GENERAL RADIOLOGY | Age: 74
DRG: 041 | End: 2024-06-12
Payer: MEDICARE

## 2024-06-12 ENCOUNTER — APPOINTMENT (OUTPATIENT)
Dept: MRI IMAGING | Age: 74
DRG: 041 | End: 2024-06-12
Payer: MEDICARE

## 2024-06-12 DIAGNOSIS — I63.9 STROKE (HCC): ICD-10-CM

## 2024-06-12 DIAGNOSIS — I63.313 CEREBROVASCULAR ACCIDENT (CVA) DUE TO BILATERAL THROMBOSIS OF MIDDLE CEREBRAL ARTERIES (HCC): ICD-10-CM

## 2024-06-12 DIAGNOSIS — I63.413 CEREBROVASCULAR ACCIDENT (CVA) DUE TO BILATERAL EMBOLISM OF MIDDLE CEREBRAL ARTERIES (HCC): Primary | ICD-10-CM

## 2024-06-12 DIAGNOSIS — I63.9 ISCHEMIC STROKE (HCC): ICD-10-CM

## 2024-06-12 LAB
AMMONIA PLAS-SCNC: 26 UMOL/L (ref 11–51)
ANION GAP SERPL CALCULATED.3IONS-SCNC: 12 MMOL/L (ref 9–16)
BACTERIA URNS QL MICRO: NORMAL
BASOPHILS # BLD: 0.07 K/UL (ref 0–0.2)
BASOPHILS NFR BLD: 1 % (ref 0–2)
BILIRUB UR QL STRIP: NEGATIVE
BUN BLD-MCNC: 18 MG/DL (ref 8–26)
BUN SERPL-MCNC: 17 MG/DL (ref 8–23)
CA-I BLD-SCNC: 1.26 MMOL/L (ref 1.15–1.33)
CALCIUM SERPL-MCNC: 9.4 MG/DL (ref 8.6–10.4)
CASTS #/AREA URNS LPF: NORMAL /LPF (ref 0–8)
CHLORIDE BLD-SCNC: 107 MMOL/L (ref 98–107)
CHLORIDE SERPL-SCNC: 106 MMOL/L (ref 98–107)
CHOLEST SERPL-MCNC: 173 MG/DL (ref 0–199)
CHOLESTEROL/HDL RATIO: 2
CK SERPL-CCNC: 53 U/L (ref 26–192)
CLARITY UR: CLEAR
CO2 BLD CALC-SCNC: 29 MMOL/L (ref 22–30)
CO2 SERPL-SCNC: 23 MMOL/L (ref 20–31)
COLOR UR: YELLOW
CREAT SERPL-MCNC: 0.8 MG/DL (ref 0.5–0.9)
EGFR, POC: >90 ML/MIN/1.73M2
EOSINOPHIL # BLD: 0.13 K/UL (ref 0–0.44)
EOSINOPHILS RELATIVE PERCENT: 1 % (ref 1–4)
EPI CELLS #/AREA URNS HPF: NORMAL /HPF (ref 0–5)
ERYTHROCYTE [DISTWIDTH] IN BLOOD BY AUTOMATED COUNT: 13.2 % (ref 11.8–14.4)
EST. AVERAGE GLUCOSE BLD GHB EST-MCNC: 108 MG/DL
GFR, ESTIMATED: 74 ML/MIN/1.73M2
GLUCOSE BLD-MCNC: 98 MG/DL (ref 74–100)
GLUCOSE SERPL-MCNC: 97 MG/DL (ref 74–99)
GLUCOSE UR STRIP-MCNC: NEGATIVE MG/DL
HBA1C MFR BLD: 5.4 % (ref 4–6)
HCO3 VENOUS: 28.9 MMOL/L (ref 22–29)
HCT VFR BLD AUTO: 41.2 % (ref 36.3–47.1)
HCT VFR BLD AUTO: 42 % (ref 36–46)
HDLC SERPL-MCNC: 72 MG/DL
HGB BLD-MCNC: 13.2 G/DL (ref 11.9–15.1)
HGB UR QL STRIP.AUTO: NEGATIVE
IMM GRANULOCYTES # BLD AUTO: 0.07 K/UL (ref 0–0.3)
IMM GRANULOCYTES NFR BLD: 1 %
INR PPP: 1
KETONES UR STRIP-MCNC: NEGATIVE MG/DL
LDLC SERPL CALC-MCNC: 86 MG/DL (ref 0–100)
LEUKOCYTE ESTERASE UR QL STRIP: NEGATIVE
LYMPHOCYTES NFR BLD: 3.1 K/UL (ref 1.1–3.7)
LYMPHOCYTES RELATIVE PERCENT: 23 % (ref 24–43)
MCH RBC QN AUTO: 31.1 PG (ref 25.2–33.5)
MCHC RBC AUTO-ENTMCNC: 32 G/DL (ref 28.4–34.8)
MCV RBC AUTO: 96.9 FL (ref 82.6–102.9)
MONOCYTES NFR BLD: 1.17 K/UL (ref 0.1–1.2)
MONOCYTES NFR BLD: 9 % (ref 3–12)
MYOGLOBIN SERPL-MCNC: 27 NG/ML (ref 25–58)
NEUTROPHILS NFR BLD: 65 % (ref 36–65)
NEUTS SEG NFR BLD: 8.73 K/UL (ref 1.5–8.1)
NITRITE UR QL STRIP: NEGATIVE
NRBC BLD-RTO: 0 PER 100 WBC
O2 SAT, VEN: 49.2 % (ref 60–85)
PARTIAL THROMBOPLASTIN TIME: 26 SEC (ref 23–36.5)
PCO2 VENOUS: 47.7 MM HG (ref 41–51)
PH UR STRIP: 7 [PH] (ref 5–8)
PH VENOUS: 7.39 (ref 7.32–7.43)
PLATELET # BLD AUTO: 293 K/UL (ref 138–453)
PMV BLD AUTO: 10.1 FL (ref 8.1–13.5)
PO2 VENOUS: 27.1 MM HG (ref 30–50)
POC ANION GAP: 9 MMOL/L (ref 7–16)
POC CREATININE: 0.7 MG/DL (ref 0.51–1.19)
POC HEMOGLOBIN (CALC): 14.3 G/DL (ref 12–16)
POC LACTIC ACID: 1 MMOL/L (ref 0.56–1.39)
POSITIVE BASE EXCESS, VEN: 3.1 MMOL/L (ref 0–3)
POTASSIUM BLD-SCNC: 4.2 MMOL/L (ref 3.5–4.5)
POTASSIUM SERPL-SCNC: 4.4 MMOL/L (ref 3.7–5.3)
PROT UR STRIP-MCNC: NEGATIVE MG/DL
PROTHROMBIN TIME: 13.3 SEC (ref 11.7–14.9)
RBC # BLD AUTO: 4.25 M/UL (ref 3.95–5.11)
RBC #/AREA URNS HPF: NORMAL /HPF (ref 0–4)
SODIUM BLD-SCNC: 144 MMOL/L (ref 138–146)
SODIUM SERPL-SCNC: 141 MMOL/L (ref 136–145)
SP GR UR STRIP: 1.02 (ref 1–1.03)
TRIGL SERPL-MCNC: 76 MG/DL
TROPONIN I SERPL HS-MCNC: 10 NG/L (ref 0–14)
TSH SERPL DL<=0.05 MIU/L-ACNC: 1.88 UIU/ML (ref 0.27–4.2)
UROBILINOGEN UR STRIP-ACNC: NORMAL EU/DL (ref 0–1)
VLDLC SERPL CALC-MCNC: 15 MG/DL
WBC #/AREA URNS HPF: NORMAL /HPF (ref 0–5)
WBC OTHER # BLD: 13.3 K/UL (ref 3.5–11.3)

## 2024-06-12 PROCEDURE — 84484 ASSAY OF TROPONIN QUANT: CPT

## 2024-06-12 PROCEDURE — 80061 LIPID PANEL: CPT

## 2024-06-12 PROCEDURE — 84443 ASSAY THYROID STIM HORMONE: CPT

## 2024-06-12 PROCEDURE — 2060000000 HC ICU INTERMEDIATE R&B

## 2024-06-12 PROCEDURE — 6370000000 HC RX 637 (ALT 250 FOR IP)

## 2024-06-12 PROCEDURE — 83605 ASSAY OF LACTIC ACID: CPT

## 2024-06-12 PROCEDURE — 85730 THROMBOPLASTIN TIME PARTIAL: CPT

## 2024-06-12 PROCEDURE — 82553 CREATINE MB FRACTION: CPT

## 2024-06-12 PROCEDURE — 81001 URINALYSIS AUTO W/SCOPE: CPT

## 2024-06-12 PROCEDURE — 82550 ASSAY OF CK (CPK): CPT

## 2024-06-12 PROCEDURE — 2580000003 HC RX 258

## 2024-06-12 PROCEDURE — 6360000002 HC RX W HCPCS

## 2024-06-12 PROCEDURE — 93005 ELECTROCARDIOGRAM TRACING: CPT | Performed by: EMERGENCY MEDICINE

## 2024-06-12 PROCEDURE — 83874 ASSAY OF MYOGLOBIN: CPT

## 2024-06-12 PROCEDURE — 99223 1ST HOSP IP/OBS HIGH 75: CPT | Performed by: PSYCHIATRY & NEUROLOGY

## 2024-06-12 PROCEDURE — 84520 ASSAY OF UREA NITROGEN: CPT

## 2024-06-12 PROCEDURE — 70450 CT HEAD/BRAIN W/O DYE: CPT

## 2024-06-12 PROCEDURE — 6360000002 HC RX W HCPCS: Performed by: EMERGENCY MEDICINE

## 2024-06-12 PROCEDURE — 82947 ASSAY GLUCOSE BLOOD QUANT: CPT

## 2024-06-12 PROCEDURE — 85014 HEMATOCRIT: CPT

## 2024-06-12 PROCEDURE — 82140 ASSAY OF AMMONIA: CPT

## 2024-06-12 PROCEDURE — 82330 ASSAY OF CALCIUM: CPT

## 2024-06-12 PROCEDURE — 6360000004 HC RX CONTRAST MEDICATION: Performed by: EMERGENCY MEDICINE

## 2024-06-12 PROCEDURE — 99285 EMERGENCY DEPT VISIT HI MDM: CPT

## 2024-06-12 PROCEDURE — 96374 THER/PROPH/DIAG INJ IV PUSH: CPT

## 2024-06-12 PROCEDURE — 70551 MRI BRAIN STEM W/O DYE: CPT

## 2024-06-12 PROCEDURE — 85610 PROTHROMBIN TIME: CPT

## 2024-06-12 PROCEDURE — 70498 CT ANGIOGRAPHY NECK: CPT

## 2024-06-12 PROCEDURE — 80048 BASIC METABOLIC PNL TOTAL CA: CPT

## 2024-06-12 PROCEDURE — 71045 X-RAY EXAM CHEST 1 VIEW: CPT

## 2024-06-12 PROCEDURE — 82565 ASSAY OF CREATININE: CPT

## 2024-06-12 PROCEDURE — 82803 BLOOD GASES ANY COMBINATION: CPT

## 2024-06-12 PROCEDURE — 85025 COMPLETE CBC W/AUTO DIFF WBC: CPT

## 2024-06-12 PROCEDURE — 83036 HEMOGLOBIN GLYCOSYLATED A1C: CPT

## 2024-06-12 PROCEDURE — 80051 ELECTROLYTE PANEL: CPT

## 2024-06-12 RX ORDER — LORAZEPAM 2 MG/ML
1 INJECTION INTRAMUSCULAR ONCE
Status: COMPLETED | OUTPATIENT
Start: 2024-06-12 | End: 2024-06-12

## 2024-06-12 RX ORDER — CLOPIDOGREL BISULFATE 75 MG/1
75 TABLET ORAL DAILY
Status: DISCONTINUED | OUTPATIENT
Start: 2024-06-13 | End: 2024-06-15 | Stop reason: HOSPADM

## 2024-06-12 RX ORDER — SODIUM CHLORIDE 0.9 % (FLUSH) 0.9 %
5-40 SYRINGE (ML) INJECTION PRN
Status: DISCONTINUED | OUTPATIENT
Start: 2024-06-12 | End: 2024-06-15 | Stop reason: HOSPADM

## 2024-06-12 RX ORDER — CLOPIDOGREL 300 MG/1
300 TABLET, FILM COATED ORAL ONCE
Status: COMPLETED | OUTPATIENT
Start: 2024-06-12 | End: 2024-06-12

## 2024-06-12 RX ORDER — SODIUM CHLORIDE 0.9 % (FLUSH) 0.9 %
5-40 SYRINGE (ML) INJECTION EVERY 12 HOURS SCHEDULED
Status: DISCONTINUED | OUTPATIENT
Start: 2024-06-12 | End: 2024-06-15 | Stop reason: HOSPADM

## 2024-06-12 RX ORDER — SODIUM CHLORIDE 9 MG/ML
INJECTION, SOLUTION INTRAVENOUS PRN
Status: DISCONTINUED | OUTPATIENT
Start: 2024-06-12 | End: 2024-06-15 | Stop reason: HOSPADM

## 2024-06-12 RX ORDER — POLYETHYLENE GLYCOL 3350 17 G/17G
17 POWDER, FOR SOLUTION ORAL DAILY PRN
Status: DISCONTINUED | OUTPATIENT
Start: 2024-06-12 | End: 2024-06-15 | Stop reason: HOSPADM

## 2024-06-12 RX ORDER — ATORVASTATIN CALCIUM 80 MG/1
80 TABLET, FILM COATED ORAL NIGHTLY
Status: DISCONTINUED | OUTPATIENT
Start: 2024-06-12 | End: 2024-06-15 | Stop reason: HOSPADM

## 2024-06-12 RX ORDER — ENOXAPARIN SODIUM 100 MG/ML
40 INJECTION SUBCUTANEOUS DAILY
Status: DISCONTINUED | OUTPATIENT
Start: 2024-06-12 | End: 2024-06-15 | Stop reason: HOSPADM

## 2024-06-12 RX ORDER — ASPIRIN 81 MG/1
81 TABLET, CHEWABLE ORAL DAILY
Status: DISCONTINUED | OUTPATIENT
Start: 2024-06-13 | End: 2024-06-15 | Stop reason: HOSPADM

## 2024-06-12 RX ORDER — LABETALOL HYDROCHLORIDE 5 MG/ML
10 INJECTION, SOLUTION INTRAVENOUS EVERY 10 MIN PRN
Status: DISCONTINUED | OUTPATIENT
Start: 2024-06-12 | End: 2024-06-14

## 2024-06-12 RX ORDER — URSODIOL 250 MG/1
250 TABLET, FILM COATED ORAL 3 TIMES DAILY
Status: DISCONTINUED | OUTPATIENT
Start: 2024-06-12 | End: 2024-06-15 | Stop reason: HOSPADM

## 2024-06-12 RX ORDER — ASPIRIN 325 MG
325 TABLET ORAL ONCE
Status: COMPLETED | OUTPATIENT
Start: 2024-06-12 | End: 2024-06-12

## 2024-06-12 RX ORDER — ONDANSETRON 4 MG/1
4 TABLET, ORALLY DISINTEGRATING ORAL EVERY 8 HOURS PRN
Status: DISCONTINUED | OUTPATIENT
Start: 2024-06-12 | End: 2024-06-15 | Stop reason: HOSPADM

## 2024-06-12 RX ORDER — ONDANSETRON 2 MG/ML
4 INJECTION INTRAMUSCULAR; INTRAVENOUS EVERY 6 HOURS PRN
Status: DISCONTINUED | OUTPATIENT
Start: 2024-06-12 | End: 2024-06-15 | Stop reason: HOSPADM

## 2024-06-12 RX ADMIN — ENOXAPARIN SODIUM 40 MG: 100 INJECTION SUBCUTANEOUS at 10:25

## 2024-06-12 RX ADMIN — CLOPIDOGREL BISULFATE 300 MG: 300 TABLET, FILM COATED ORAL at 08:08

## 2024-06-12 RX ADMIN — Medication 1 MG: at 08:41

## 2024-06-12 RX ADMIN — URSODIOL 250 MG: 250 TABLET, FILM COATED ORAL at 20:18

## 2024-06-12 RX ADMIN — SODIUM CHLORIDE, PRESERVATIVE FREE 10 ML: 5 INJECTION INTRAVENOUS at 20:19

## 2024-06-12 RX ADMIN — ASPIRIN 325 MG: 325 TABLET ORAL at 08:08

## 2024-06-12 RX ADMIN — URSODIOL 250 MG: 250 TABLET, FILM COATED ORAL at 14:31

## 2024-06-12 RX ADMIN — SODIUM CHLORIDE, PRESERVATIVE FREE 10 ML: 5 INJECTION INTRAVENOUS at 10:25

## 2024-06-12 RX ADMIN — ATORVASTATIN CALCIUM 80 MG: 80 TABLET, FILM COATED ORAL at 08:08

## 2024-06-12 RX ADMIN — IOPAMIDOL 90 ML: 755 INJECTION, SOLUTION INTRAVENOUS at 07:45

## 2024-06-12 ASSESSMENT — PAIN - FUNCTIONAL ASSESSMENT: PAIN_FUNCTIONAL_ASSESSMENT: NONE - DENIES PAIN

## 2024-06-12 NOTE — ED PROVIDER NOTES
[facility] ED  EMERGENCY DEPARTMENT ENCOUNTER    Pt Name: Ivy Rubio  MRN: [id number]  Birthdate1950  Date of evaluation: 6/12/2024    Note Started: 7:34 AM EDT    CHIEF COMPLAINT       Chief Complaint   Patient presents with    Extremity Weakness     Left side    Facial Droop         HISTORY OF PRESENT ILLNESS    Ivy Rubio is a 73 y.o. female who presents arrives by EMS provides independent history concern for stroke.  Patient's last known well was midnight.  Went to bed at that time woke up this morning with left facial droop and left-sided weakness.  Normal blood sugar for EMS transport without incident.  On arrival patient denies any prior history of stroke no history of hypertension diabetes or smoking.  No family here for additional information.  Additional history not obtained due to critical nature of patient        REVIEW OF SYSTEMS       Review of Systems   Unable to perform ROS: Acuity of condition       PAST MEDICAL HISTORY    has a past medical history of Abnormal EKG, Chest pain, Diverticulosis, History of bilateral breast cancer, Primary biliary cholangitis (HCC), Short of breath on exertion, Urinary frequency, and Wears glasses.    SURGICAL HISTORY      has a past surgical history that includes Splenectomy (N/A); Appendectomy; Colon surgery; Total abdominal hysterectomy w/ bilateral salpingoophorectomy; lipoma resection (Right); cyst removal (Left); Upper gastrointestinal endoscopy; Breast biopsy (Left, 10/2015); Mastectomy, bilateral (12/2015); pr colon ca scrn not hi rsk ind (N/A, 8/22/2017); Colonoscopy; Endoscopy, colon, diagnostic; pr removal tissue expander w/o insertion implant (Bilateral, 7/11/2018); Breast Implant Removal (2019); IR BIOPSY LIVER PERCUTANEOUS (2/23/2022); and Knee arthroscopy (Right, 10/5/2023).    CURRENT MEDICATIONS       Previous Medications    ASPIRIN 81 MG CHEWABLE TABLET    Take 1 tablet by mouth daily    CALCIUM & MAGNESIUM  admission    DIAGNOSTIC RESULTS     EKG: All EKG's are interpreted by the Emergency Department Physician who either signs or Co-signs this chart in the absenceof a cardiologist.    Sinus bradycardia 52.  Normal intervals normal axis there is an incomplete right bundle branch block no acute ST or T changes.  No acute finding    RADIOLOGY:   I directly visualized the following  images and reviewed theradiologist interpretations:     XR CHEST PORTABLE   Final Result   No acute process.      Suggestion of mild infiltrate in the left lower lobe.  This may represent   pneumonia.         CT HEAD WO CONTRAST   Final Result   Addendum (preliminary) 1 of 1   ADDENDUM:   The results were conveyed to Dr. Arriaza on 06/12/2024 at a.m.         Final   No acute intracranial abnormality.         CTA HEAD NECK W CONTRAST   Final Result   1. No significant stenosis or occlusion of the major arterial vessels of the   head and neck.   2. Beaded appearance to the right cervical internal carotid suggestive of   mild fibromuscular dysplasia.         MRI BRAIN WO CONTRAST    (Results Pending)           ED BEDSIDE ULTRASOUND:   none    LABS:  Labs Reviewed   STROKE PANEL - Abnormal; Notable for the following components:       Result Value    WBC 13.3 (*)     Lymphocytes % 23 (*)     Immature Granulocytes % 1 (*)     Neutrophils Absolute 8.73 (*)     All other components within normal limits   VENOUS BLOOD GAS, POINT OF CARE - Abnormal; Notable for the following components:    PO2, [place] 27.1 (*)     Positive Base Excess, [place] 3.1 (*)     O2 Sat, [place] 49.2 (*)     All other components within normal limits   ELECTROLYTES PLUS   HGB/HCT   CALCIUM, IONIC (POC)   URINALYSIS WITH MICROSCOPIC   AMMONIA   LIPID PANEL   TSH WITH REFLEX   HEMOGLOBIN A1C   CREATININE W/GFR POINT OF CARE   UREA N (POC)   LACTIC ACID,POINT OF CARE   POCT GLUCOSE           EMERGENCY DEPARTMENT COURSE:   Vitals:    Vitals:    06/12/24 0922 06/12/24 0945 06/12/24 0956 06/12/24

## 2024-06-12 NOTE — CARE COORDINATION
Case Management Assessment  Initial Evaluation    Date/Time of Evaluation: 6/12/2024 3:16 PM  Assessment Completed by: Lucille Kendrick RN    If patient is discharged prior to next notation, then this note serves as note for discharge by case management.    Patient Name: Ivy Rubio                   YOB: 1950  Diagnosis: Ischemic stroke (HCC) [I63.9]  Cerebrovascular accident (CVA) due to bilateral embolism of middle cerebral arteries (HCC) [I63.413]                   Date / Time: 6/12/2024  7:29 AM    Patient Admission Status: Inpatient   Readmission Risk (Low < 19, Mod (19-27), High > 27): Readmission Risk Score: 9.8    Current PCP: Marilee Garcia MD  PCP verified by CM? (P) Yes (Dr. Marilee Garcia)    Chart Reviewed: Yes      History Provided by: (P) Patient  Patient Orientation: (P) Alert and Oriented, Person, Place, Situation    Patient Cognition: (P) Alert    Hospitalization in the last 30 days (Readmission):  No    If yes, Readmission Assessment in  Navigator will be completed.    Advance Directives:      Code Status: Full Code   Patient's Primary Decision Maker is: (P) Legal Next of Kin    Primary Decision Maker: Reba Mcknight - Child - [phone]    Secondary Decision Maker: Km Rubio - Child - [phone]    Discharge Planning:    Patient lives with: (P) Alone Type of Home: (P) House  Primary Care Giver: (P) Self  Patient Support Systems include: (P) Children   Current Financial resources: (P) Medicare  Current community resources: (P) None  Current services prior to admission: (P) None            Current DME:              Type of Home Care services:  (P) None    ADLS  Prior functional level: (P) Independent in ADLs/IADLs  Current functional level: (P) Assistance with the following:, Toileting, Mobility    PT AM-PAC:   /24  OT AM-PAC:   /24    Family can provide assistance at DC: (P) Yes  Would you like Case Management to discuss the discharge plan with any other family  members/significant others, and if so, who? (P) Yes (Children)  Plans to Return to Present Housing: (P) Yes  Other Identified Issues/Barriers to RETURNING to current housing: current medical condition   Potential Assistance needed at discharge: (P) Outpatient PT/OT, Home Care            Potential DME:    Patient expects to discharge to: (P) House  Plan for transportation at discharge: (P) Family    Financial    Payor: MEDICARE / Plan: MEDICARE PART A AND B / Product Type: *No Product type* /     Does insurance require precert for SNF: No    Potential assistance Purchasing Medications: (P) No  Meds-to-Beds request: Yes      [facility] [id number] [place] [facility] [place] [place] - [address] [facility] [address] - P [phone] - F [phone]  [address] [place] [address] [address]  [facility] [address]  Phone: [phone] Fax: [phone]      Notes:    Factors facilitating achievement of predicted outcomes: Family support, Cooperative, and Pleasant    Barriers to discharge: Medical complications    Additional Case Management Notes: Spoke with patient, role explained. Plan is to return home. Monitor for additional needs. Await PT/OT evals. Address, PCP and insurance reviewed.     The Plan for Transition of Care is related to the following treatment goals of Ischemic stroke (HCC) [I63.9]  Cerebrovascular accident (CVA) due to bilateral embolism of middle cerebral arteries (HCC) [I63.413]    IF APPLICABLE: The Patient and/or patient representative Ivy and her family were provided with a choice of provider and agrees with the discharge plan. Freedom of choice list with basic dialogue that supports the patient's individualized plan of care/goals and shares the quality data associated with the providers was provided to: (P) Patient   Patient Representative Name:       The Patient and/or Patient Representative Agree with the Discharge Plan? (P) Yes    Lucille Kendrick RN  Case Management Department  Ph: [phone]

## 2024-06-12 NOTE — ED NOTES
Pt up to bedside commode with +1 assistance. Pt reported some dizziness, but states she gets motion sickness. Stable gait

## 2024-06-12 NOTE — CONSULTS
Department of Endovascular Neurosurgery                                                                                                                      Resident Consult Note  Stroke Alert paged @7:26 AM on 6/12/2024  ER Room # 3  Arrival to patient bedside @7:28 AM        Reason for Consult: Left facial droop and subjective left-sided weakness  Requesting Physician:    Endovascular Neurosurgeon:   []Dr. Hughes  []Dr. Davis  [x]Dr. Tong   []    History Obtained From:  patient, electronic medical record    CHIEF COMPLAINT:       Left facial droop and left-sided weakness    HISTORY OF PRESENT ILLNESS:       The patient is a 73 y.o. female with past medical history of breast cancer, dyslipidemia, hepatic cirrhosis, primary biliary cholangitis, nonalcoholic fatty liver disease presents for subjective symptoms of left-sided numbness and weakness.  Last known well is 12 AM on 6/12/2024.  Patient went to sleep at 12 AM and woke up with symptoms.  She was noted to have left facial droop and left-sided weakness as per the patient's son who is not present at bedside.  Patient takes aspirin 81 mg for primary prevention of cardiovascular events.  She denies prior history of stroke or MI.  Patient endorses symptoms of mild confusion and left arm numbness and weakness.  NIHSS 3 for mild left facial droop, and ataxia with right arm, and left arm drift.     CT head without IV contrast shows mild hypodensities in left basal ganglia likely chronic microvascular disease.  No other intracranial abnormalities noted such as hemorrhage or large areas of hypodensity.  There are no prior CT scans of the head to compare.    CTA head and neck seems unremarkable.    On chart review, patient was found to have invasive grade 2 ductal carcinoma of the left breast which was ER/OH positive on 10/9/2015.  She underwent bilateral mastectomy on  contrast.  -Obtain ammonia, lipid panel, A1c, and TSH.  -Obtain chest x-ray and urinalysis.  -Load with aspirin and Plavix (325 mg and 300 mg respectively).  Continue with maintenance aspirin 81 mg and Plavix 75 mg daily.  -Start Lipitor 80 mg for now.  -Based on workup, further neurological evaluation may be necessary.  -SBP goal less than 220       Thank you for your consult.       Bj Mcclain, DO   PGY 3 Neurology Resident  6/12/2024 at 7:49 AM

## 2024-06-12 NOTE — ED NOTES
Pt resting on cot, RR even and NL, a/o x4, no distress noted. Call light within reach. Family at bedside. Will continue with plan of care

## 2024-06-12 NOTE — ED NOTES
ED to inpatient nurses report      Chief Complaint:  Chief Complaint   Patient presents with    Extremity Weakness     Left side    Facial Droop     Present to ED from: home via EMS    MOA:     LOC: alert and orientated to name and place  Mobility: Requires assistance * 1  Oxygen Baseline: RA    Current needs required: n/a   Pending ED orders: n/a  Present condition: resting on cot, RR even and NL, no distress noted, family at bedside    Why did the patient come to the ED?       Pt to ED via EMS for stroke alert. EMS noted LKW this morning at 12am. Pt states she woke up this morning at 0600 with left sided weakness, numbness, and left sided facial droop. Pt has no known hx of stroke and no blood thinners. Upon arrival EMS states pt seems to be having more confusion. Pt is alert and oriented x3 upon arrival. No numbness noted upon arrival. Pt denies any pain. Hx of breast cancer   Pt has been mildly bradycardic since arrival, asymptomatic   What is the plan? Admit for stroke like symptoms/confusion   Any procedures or intervention occur? CTA head and neck, CT head, MRI brain, labs, EKG, full cardiac monitor  Any safety concerns?? Fall risk    Mental Status:  Level of Consciousness: Alert (0)    Psych Assessment:   Psychosocial  Psychosocial (WDL): Within Defined Limits  Vital signs   Vitals:    06/12/24 0815 06/12/24 0831 06/12/24 0842 06/12/24 0922   BP: 122/75 (!) 145/68  112/65   Pulse: 54 57 (!) 47 56   Resp: 18 13 13 18   Temp:       TempSrc:       SpO2: 98% 94% 99%    Weight:            Vitals:  Patient Vitals for the past 24 hrs:   BP Temp Temp src Pulse Resp SpO2 Weight   06/12/24 0922 112/65 -- -- 56 18 -- --   06/12/24 0842 -- -- -- (!) 47 13 99 % --   06/12/24 0831 (!) 145/68 -- -- 57 13 94 % --   06/12/24 0815 122/75 -- -- 54 18 98 % --   06/12/24 0755 -- -- -- -- -- -- 77.1 kg (170 lb)   06/12/24 0752 121/63 98.3 °F (36.8 °C) Oral 52 14 -- --   06/12/24 0735 (!) 131/101 -- -- 59 22 97 % --      Visit  IMPLANT Bilateral 7/11/2018    BREAST IMPLANT REMOVAL performed by Rakan Soliman MD at [facility] [place] OR    SPLENECTOMY N/A     MONA AND BSO (CERVIX REMOVED)      UPPER GASTROINTESTINAL ENDOSCOPY         PAST MEDICAL HISTORY       Past Medical History:   Diagnosis Date    Abnormal EKG 12/2015    Chest pain     rare    Diverticulosis     History of bilateral breast cancer 10/2015    Primary biliary cholangitis (HCC)     Sees Dr. Harden    Short of breath on exertion     Urinary frequency     Wears glasses        Labs:  Labs Reviewed   STROKE PANEL - Abnormal; Notable for the following components:       Result Value    WBC 13.3 (*)     Lymphocytes % 23 (*)     Immature Granulocytes % 1 (*)     Neutrophils Absolute 8.73 (*)     All other components within normal limits   VENOUS BLOOD GAS, POINT OF CARE - Abnormal; Notable for the following components:    PO2, [place] 27.1 (*)     Positive Base Excess, [place] 3.1 (*)     O2 Sat, [place] 49.2 (*)     All other components within normal limits   ELECTROLYTES PLUS   HGB/HCT   CALCIUM, IONIC (POC)   URINALYSIS WITH MICROSCOPIC   LIPID PANEL   TSH WITH REFLEX   AMMONIA   HEMOGLOBIN A1C   CREATININE W/GFR POINT OF CARE   UREA N (POC)   LACTIC ACID,POINT OF CARE   POCT GLUCOSE       Electronically signed by Starla Crawford RN on 6/12/2024 at 9:40 AM

## 2024-06-12 NOTE — PLAN OF CARE
Problem: Safety - Adult  Goal: Free from fall injury  Outcome: Progressing     Problem: Discharge Planning  Goal: Discharge to home or other facility with appropriate resources  Outcome: Progressing     Problem: ABCDS Injury Assessment  Goal: Absence of physical injury  Outcome: Progressing     Problem: Chronic Conditions and Co-morbidities  Goal: Patient's chronic conditions and co-morbidity symptoms are monitored and maintained or improved  Outcome: Progressing

## 2024-06-12 NOTE — ED NOTES
Pt to ED via EMS for stroke alert. EMS noted LKW this morning at 12am. Pt states she woke up this morning at 0600 with left sided weakness, numbness, and left sided facial droop. Pt has no known hx of stroke and no blood thinners. Upon arrival EMS states pt seems to be having more confusion. Pt is alert and oriented x3 upon arrival. No numbness noted upon arrival. Pt denies any pain. Pt placed on continuous cardiac monitor, bp and pulse ox. IV established, blood work drawn. Pt alert and oriented x3, talking in complete sentences, respirations even and unlabored. Pt acting age appropriate. White board updated, will continue to plan of care

## 2024-06-12 NOTE — PROGRESS NOTES
[facility] [place]    PATIENT NAME: Ivy Rubio    Shift date: 6/12/2024   Shift day: Wednesday   Shift # 1    Room # 03/03   Name: Ivy Rubio            Age: 73 y.o.  Gender: female          Voodoo: Taoism   Place of Nondenominational:     Trauma/Incident type: Stroke Alert  Admit Date & Time: 6/12/2024  7:29 AM      PATIENT/EVENT DESCRIPTION:  Ivy Rubio is a 73 y.o. female who arrived via ground ambulance with stroke-like symptoms.  Pt to be admitted to 03/03.         SPIRITUAL ASSESSMENT-INTERVENTION-OUTCOME:  Pt and her son, Km, was present. Pt spoke quietly and slowly. She said she woke up not knowing where she was or how to use the phone. Pt said she has two other children.  provided support through active listening and words of affirmation, and assurance of prayers. Pt and son were receptive to spiritual health visit and expressed gratitude for support.      PATIENT BELONGINGS:  No belongings noted    ANY BELONGINGS OF SIGNIFICANT VALUE NOTED:      REGISTRATION STAFF NOTIFIED?  No      WHAT IS YOUR SPIRITUAL CARE PLAN FOR THIS PATIENT?:   Chaplains will remain available to offer spiritual and emotional support as needed.     Electronically signed by Chaplain Danae, on 6/12/2024 at 8:50 AM.  [facility]  [phone]

## 2024-06-12 NOTE — H&P
[facility]   IN-PATIENT SERVICE   [facility]    HISTORY AND PHYSICAL EXAMINATION            Date:   6/12/2024  Patient name:  Ivy Rubio  Date of admission:  6/12/2024  7:29 AM  MRN:   [id number]  Account:  [id number]  YOB: 1950  PCP:    Marilee Garcia MD  Room:   03/03  Code Status:    Prior    Chief Complaint:     Chief Complaint   Patient presents with    Extremity Weakness     Left side    Facial Droop       History Obtained From:     patient, electronic medical record    History of Present Illness:     The patient is a 73 y.o. female with past medical history of breast cancer, dyslipidemia, hepatic cirrhosis, primary biliary cholangitis, nonalcoholic fatty liver disease presents for subjective symptoms of left-sided numbness and weakness.  Last known well is 12 AM on 6/12/2024.  Patient went to sleep at 12 AM and woke up with symptoms.  She was noted to have left facial droop and left-sided weakness as per the patient's son who is not present at bedside.  Patient takes aspirin 81 mg for primary prevention of cardiovascular events.  She denies prior history of stroke or MI.  Patient endorses symptoms of mild confusion and left arm numbness and weakness.  NIHSS 3 for mild left facial droop, and ataxia with right arm, and left arm drift.      CT head without IV contrast shows mild hypodensities in left basal ganglia likely chronic microvascular disease.  No other intracranial abnormalities noted such as hemorrhage or large areas of hypodensity.  There are no prior CT scans of the head to compare.     CTA head and neck seems unremarkable.     On chart review, patient was found to have invasive grade 2 ductal carcinoma of the left breast which was ER/ID positive on 10/9/2015.  She underwent bilateral mastectomy on 12/16/2015.  Pathology at that time showed 8 mm invasive ductal carcinoma and outer breast quadrant with clear margins.  In 2022, she presented with

## 2024-06-12 NOTE — ED NOTES
Writer notified CT of stroke alert non critical. CT tech states that they are ready for the patient at this time. Writer informed Starla LLAMAS.

## 2024-06-13 PROBLEM — I63.413 CEREBROVASCULAR ACCIDENT (CVA) DUE TO BILATERAL EMBOLISM OF MIDDLE CEREBRAL ARTERIES (HCC): Status: ACTIVE | Noted: 2024-06-13

## 2024-06-13 LAB
EKG ATRIAL RATE: 52 BPM
EKG P AXIS: 57 DEGREES
EKG P-R INTERVAL: 156 MS
EKG Q-T INTERVAL: 468 MS
EKG QRS DURATION: 118 MS
EKG QTC CALCULATION (BAZETT): 435 MS
EKG R AXIS: 79 DEGREES
EKG T AXIS: 62 DEGREES
EKG VENTRICULAR RATE: 52 BPM
ERYTHROCYTE [DISTWIDTH] IN BLOOD BY AUTOMATED COUNT: 13.1 % (ref 11.8–14.4)
HCT VFR BLD AUTO: 36.2 % (ref 36.3–47.1)
HGB BLD-MCNC: 11.8 G/DL (ref 11.9–15.1)
MCH RBC QN AUTO: 30.5 PG (ref 25.2–33.5)
MCHC RBC AUTO-ENTMCNC: 32.6 G/DL (ref 28.4–34.8)
MCV RBC AUTO: 93.5 FL (ref 82.6–102.9)
NRBC BLD-RTO: 0 PER 100 WBC
PLATELET # BLD AUTO: 303 K/UL (ref 138–453)
PMV BLD AUTO: 9.5 FL (ref 8.1–13.5)
RBC # BLD AUTO: 3.87 M/UL (ref 3.95–5.11)
WBC OTHER # BLD: 10 K/UL (ref 3.5–11.3)

## 2024-06-13 PROCEDURE — 36415 COLL VENOUS BLD VENIPUNCTURE: CPT

## 2024-06-13 PROCEDURE — 6370000000 HC RX 637 (ALT 250 FOR IP)

## 2024-06-13 PROCEDURE — 97162 PT EVAL MOD COMPLEX 30 MIN: CPT

## 2024-06-13 PROCEDURE — 99232 SBSQ HOSP IP/OBS MODERATE 35: CPT | Performed by: PSYCHIATRY & NEUROLOGY

## 2024-06-13 PROCEDURE — 6360000002 HC RX W HCPCS

## 2024-06-13 PROCEDURE — 97535 SELF CARE MNGMENT TRAINING: CPT

## 2024-06-13 PROCEDURE — 85027 COMPLETE CBC AUTOMATED: CPT

## 2024-06-13 PROCEDURE — 2580000003 HC RX 258

## 2024-06-13 PROCEDURE — 2060000000 HC ICU INTERMEDIATE R&B

## 2024-06-13 PROCEDURE — 97530 THERAPEUTIC ACTIVITIES: CPT

## 2024-06-13 PROCEDURE — 97166 OT EVAL MOD COMPLEX 45 MIN: CPT

## 2024-06-13 RX ADMIN — URSODIOL 250 MG: 250 TABLET, FILM COATED ORAL at 08:31

## 2024-06-13 RX ADMIN — ATORVASTATIN CALCIUM 80 MG: 80 TABLET, FILM COATED ORAL at 20:36

## 2024-06-13 RX ADMIN — SODIUM CHLORIDE, PRESERVATIVE FREE 10 ML: 5 INJECTION INTRAVENOUS at 20:36

## 2024-06-13 RX ADMIN — URSODIOL 250 MG: 250 TABLET, FILM COATED ORAL at 20:36

## 2024-06-13 RX ADMIN — SODIUM CHLORIDE, PRESERVATIVE FREE 10 ML: 5 INJECTION INTRAVENOUS at 08:31

## 2024-06-13 RX ADMIN — ASPIRIN 81 MG 81 MG: 81 TABLET ORAL at 08:31

## 2024-06-13 RX ADMIN — URSODIOL 250 MG: 250 TABLET, FILM COATED ORAL at 13:43

## 2024-06-13 RX ADMIN — CLOPIDOGREL BISULFATE 75 MG: 75 TABLET ORAL at 08:31

## 2024-06-13 RX ADMIN — ENOXAPARIN SODIUM 40 MG: 100 INJECTION SUBCUTANEOUS at 08:31

## 2024-06-13 ASSESSMENT — ENCOUNTER SYMPTOMS
COUGH: 0
STRIDOR: 0
WHEEZING: 0
SHORTNESS OF BREATH: 0
ABDOMINAL DISTENTION: 0
ABDOMINAL PAIN: 0

## 2024-06-13 NOTE — PROGRESS NOTES
AND BSO (CERVIX REMOVED)      UPPER GASTROINTESTINAL ENDOSCOPY          Medications Prior to Admission:     Prior to Admission medications    Medication Sig Start Date End Date Taking? Authorizing Provider   clindamycin (CLEOCIN) 150 MG capsule Take 1 capsule by mouth 3 times daily    Ellyn Pelletier MD   Magnesium 500 MG CAPS 1 tablet with a meal Orally Once a day    Ellyn Pelletier MD   ciclopirox (PENLAC) 8 % solution Apply topically nightly. Remove once weekly with alcohol or nail polish remover. 9/26/23   Connie Ghosh DPM   ursodiol (ACTIGALL) 500 MG tablet TAKE 1/2 TABLET BY MOUTH 3 TIMES DAILY 8/16/23   Jack Silver APRN - NP   polyethyl glycol-propyl glycol 0.4-0.3 % (SYSTANE ULTRA) 0.4-0.3 % ophthalmic solution Place 1 drop into both eyes as needed for Dry Eyes 9/19/22   Kina Davis APRN - CNP   NONFORMULARY Liver detox    Ellyn Pelletier MD   Mastectomy Bra MISC by Does not apply route 8/2/18   Fuentes Schroeder MD   aspirin 81 MG chewable tablet Take 1 tablet by mouth daily    Ellyn Pelletier MD   Coenzyme Q10 (CO Q-10 PO) Take 1 capsule by mouth daily    Ellyn Pelletier MD   Ginger, Zingiber officinalis, (GINGER ROOT) 550 MG CAPS Take 550 mg by mouth as needed    Ellyn Pelletier MD   NONFORMULARY Take 500 mg by mouth daily Indications: Tumeric Capsules OTC    Ellyn Pelletier MD   fexofenadine (ALLEGRA ALLERGY) 180 MG tablet Take 1 tablet by mouth daily  Patient taking differently: Take 1 tablet by mouth daily as needed 12/28/16   Marilee Garcia MD   ibuprofen (ADVIL;MOTRIN) 600 MG tablet Take 1 tablet by mouth every 6 hours as needed for Pain 12/31/15   Marilee Garcia MD   CALCIUM & MAGNESIUM CARBONATES PO Take 1 tablet by mouth daily     Ellyn Pelletier MD   NONFORMULARY Take 1 capsule by mouth 2 times daily (with meals) [facility]: Natural plant sterols    Ellyn Pelletier MD   Multiple Vitamins-Minerals (MULTI  pg    MCHC 32.6 28.4 - 34.8 g/dL    RDW 13.1 11.8 - 14.4 %    Platelets 303 138 - 453 k/uL    MPV 9.5 8.1 - 13.5 fL    NRBC Automated 0.0 0.0 per 100 WBC     Recent Labs     06/13/24  0537   WBC 10.0   RBC 3.87*   HGB 11.8*   HCT 36.2*   MCV 93.5   MCH 30.5   MCHC 32.6   RDW 13.1      MPV 9.5     Recent Labs     06/12/24  0732 06/12/24  0733     --    K 4.4  --      --    CO2 23  --    BUN 17  --    CREATININE 0.8 0.7   GLUCOSE 97  --    CALCIUM 9.4  --      Hemoglobin A1C   Date Value Ref Range Status   06/12/2024 5.4 4.0 - 6.0 % Final       Assessment :      Primary Problem  Ischemic stroke (HCC)    Active Hospital Problems    Diagnosis Date Noted    Cerebrovascular accident (CVA) due to bilateral thrombosis of middle cerebral arteries (HCC) [I63.313] 06/12/2024    Ischemic stroke (HCC) [I63.9] 06/12/2024       73-year-old female with past medical history of breast cancer, dyslipidemia, hepatic cirrhosis, primary biliary cholangitis, nonalcoholic fatty liver disease who presents with left upper and lower extremity numbness and weakness.  Last known well 12 AM on 6/12/2024. NIH 3 for left facial droop, ataxia right arm, left arm drift.  She denies prior history of stroke and MI.  She takes aspirin 81 mg daily for primary prevention of cardiovascular events.  CT head without contrast showed mild hypodensities left basal ganglia, likely chronic microvascular disease.  No acute intracranial abnormalities noted.  CTA head and neck unremarkable. MRI brain showed acute infarcts right precentral and postcentral gyri and bilateral posterior parietal lobes, left greater than right.     Plan:     Acute infarcts right precentral and postcentral gyri and bilateral posterior parietal lobes  Mechanism: suspected cardioembolic   -Permissive HTN for 24 hours; Goal SBP < 220. Gradually normalize afterwards  -Labetalol 10 mg PRN   -Loaded with ASA, Plavix and continued on DAPT for 21 days followed by Plavix

## 2024-06-13 NOTE — PLAN OF CARE
Problem: Safety - Adult  Goal: Free from fall injury  6/13/2024 1802 by Usha Park RN  Outcome: Progressing  Flowsheets (Taken 6/13/2024 0725)  Free From Fall Injury: Instruct family/caregiver on patient safety  6/13/2024 0424 by Vilma Miranda RN  Outcome: Progressing     Problem: Discharge Planning  Goal: Discharge to home or other facility with appropriate resources  6/13/2024 1802 by Usha Park RN  Outcome: Progressing  Flowsheets (Taken 6/13/2024 0837)  Discharge to home or other facility with appropriate resources:   Identify barriers to discharge with patient and caregiver   Arrange for needed discharge resources and transportation as appropriate   Identify discharge learning needs (meds, wound care, etc)   Refer to discharge planning if patient needs post-hospital services based on physician order or complex needs related to functional status, cognitive ability or social support system  6/13/2024 0424 by Vilma Miranda RN  Outcome: Progressing     Problem: ABCDS Injury Assessment  Goal: Absence of physical injury  6/13/2024 1802 by Usha Park RN  Outcome: Progressing  Flowsheets (Taken 6/13/2024 0725)  Absence of Physical Injury: Implement safety measures based on patient assessment  6/13/2024 0424 by Vilma Miranda RN  Outcome: Progressing     Problem: Chronic Conditions and Co-morbidities  Goal: Patient's chronic conditions and co-morbidity symptoms are monitored and maintained or improved  6/13/2024 1802 by Usha Park RN  Outcome: Progressing  Flowsheets (Taken 6/13/2024 0837)  Care Plan - Patient's Chronic Conditions and Co-Morbidity Symptoms are Monitored and Maintained or Improved:   Monitor and assess patient's chronic conditions and comorbid symptoms for stability, deterioration, or improvement   Collaborate with multidisciplinary team to address chronic and comorbid conditions and prevent exacerbation or deterioration   Update acute care plan with

## 2024-06-13 NOTE — PROGRESS NOTES
Assessment: Patient was awake and alert when  visited. Family was present and receptive to spiritual care. Patient was in good spirit and seemed to be doing well. When asked how she was feeling, patient responded, \"I am feeling better today than yesterday.\" Patient said she was raised Orthodox and a member of [employer], [place]. Patient received sacrament of anointing of the sick.  Intervention:  maintained listening presence, offered support and prayed with patient and family.   Outcome: Patient and family expressed gratitude for the anointing and blessing they received.   Plan: Chaplains would continue to remain available for more spiritual and emotional support as needed.

## 2024-06-13 NOTE — CARE COORDINATION
Met with pt to complete a PHQ 9 assessment.  Pt's son also present in the room.  Pt reports a great deal of family support.  She denies any depression symptoms in the past few weeks.    Patient Health Questionnaire-9 (PHQ-9)    Over the last 2 weeks, how often have you been bothered by any of the following problems?    1. Little Interest or pleasure in doing things?   [x] Not at all  [] Several Days  [] More than half the day  []  Nearly every day    2. Feeling down, depressed or hopeless?    [x] Not at all  [] Several Days  [] More than half the day  []  Nearly every day    3. Trouble falling or staying asleep, or sleeping too much?   [x] Not at all  [] Several Days  [] More than half the day  []  Nearly every day    4. Feeling tired or having little energy?   [x] Not at all  [] Several Days  [] More than half the day  []  Nearly every day    5. Poor apettite or overeating?   [x] Not at all  [] Several Days  [] More than half the day  []  Nearly every day    6. Feeling bad about yourself-or that you are a failure or have let yourself or your family down?   [x] Not at all  [] Several Days  [] More than half the day  []  Nearly every day    7. Trouble concentrating on things, such as reading the newspaper or watching television?   [x] Not at all  [] Several Days  [] More than half the day  []  Nearly every day    8. Moving or speaking so slowly that other people could have noticed? Or the opposite-being so fidgety or restless that you have been moving around a lot more than usual?   [x] Not at all  [] Several Days  [] More than half the day  []  Nearly every day    9. Thoughts that you would be better off dead or of hurting yourself in some way?   [x] Not at all  [] Several Days  [] More than half the day  []  Nearly every day    Total Score: 0    If you checked off any problems, how difficult have these problems made it for you to do your work, take care of things at home, or get along with other people?   [x] Not

## 2024-06-13 NOTE — PROGRESS NOTES
Stairs to enter with rails  Entrance Stairs - Number of Steps: 4  Entrance Stairs - Rails: Right  Bathroom Shower/Tub: Tub/Shower unit  Bathroom Toilet: Standard  Bathroom Equipment: None  Home Equipment: Cane, Walker - Rolling (does not use DME at baseline)  Has the patient had two or more falls in the past year or any fall with injury in the past year?: No  Receives Help From: Family  ADL Assistance: Independent  Homemaking Assistance: Independent  Homemaking Responsibilities: Yes  Ambulation Assistance: Independent  Transfer Assistance: Independent  Active : Yes  Mode of Transportation: [employer]  Occupation: Part time employment  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  Leisure & Hobbies: Attending [employer] sporting events  Additional Comments: Family support at discharge    Vision/Hearing  Vision  Vision: Impaired  Vision Exceptions: Wears glasses at all times  Hearing  Hearing: Within functional limits      Cognition   Orientation  Overall Orientation Status: Within Functional Limits  Orientation Level: Oriented X4  Cognition  Overall Cognitive Status: WFL     Objective              AROM RLE (degrees)  RLE AROM: WFL  AROM LLE (degrees)  LLE AROM : WFL  AROM RUE (degrees)  RUE AROM : WFL  AROM LUE (degrees)  LUE AROM : WFL  Strength RLE  Strength RLE: Exception  R Hip Flexion: 4/5  R Knee Flexion: 4+/5  R Knee Extension: 4+/5  R Ankle Dorsiflexion: 4+/5  R Ankle Plantar flexion: 4+/5  Strength LLE  Strength LLE: Exception  L Hip Flexion: 4-/5  L Knee Flexion: 4+/5  L Knee Extension: 4+/5  L Ankle Dorsiflexion: 4+/5  L Ankle Plantar Flexion: 4+/5  Strength RUE  Strength RUE: Exception  Comment: Antigravity motion observed through AROM. See OT for details.  Strength LUE  Strength LUE: Exception  Comment: Antigravity motion observed through AROM. See OT for details.          Bed mobility  Bed Mobility Comments: Not assessed. Pt seated EOB upon writer entrance and seated in chair upon writer  exit.  Transfers  Sit to Stand: Contact guard assistance (hand held assist.)  Stand to Sit: Stand by assistance  Comment: Pt performed STS x2 throughout session including toilet transfer. No LOB noted. Pt performed all transfers without AD.  Ambulation  Surface: Level tile  Device: No Device  Assistance: Minimal assistance;Contact guard assistance  Quality of Gait: Pt mildly unsteady during ambulation. One LOB noted when crossing threshold into hallway, pt able to recover balance independently.  Gait Deviations: Slow Neema;Decreased step length;Decreased step height  Distance: 100 ft  More Ambulation?: No  Stairs/Curb  Stairs?: Yes  Stairs  # Steps : 8  Stairs Height: 6\"  Rails: Right ascending (R ascending and R descending)  Device: No Device  Assistance: Contact guard assistance  Comment: Pt mildly unsteady on stairs. No significant LOB noted. Pt educated on ascending steps with R LE and descending steps with L LE with good return.     Balance  Posture: Fair  Sitting - Static: Good  Sitting - Dynamic: Fair;+  Standing - Static: Fair;+  Standing - Dynamic: Fair (Pt stands ~ 15 minutes throughout session with SBA-CGA during functional tasks.)  Comments: Standing assessed without AD.             AM-PAC - Mobility    AM-PAC Basic Mobility - Inpatient   How much help is needed turning from your back to your side while in a flat bed without using bedrails?: None  How much help is needed moving from lying on your back to sitting on the side of a flat bed without using bedrails?: A Little  How much help is needed moving to and from a bed to a chair?: A Little  How much help is needed standing up from a chair using your arms?: A Little  How much help is needed walking in hospital room?: A Little  How much help is needed climbing 3-5 steps with a railing?: A Little  [facility] Inpatient Mobility Raw Score : 19  AM-PAC Inpatient T-Scale Score : 45.44  Mobility Inpatient CMS 0-100% Score: 41.77  Mobility Inpatient CMS G-Code

## 2024-06-13 NOTE — PROGRESS NOTES
Goal 4: Complete functional transfers/mobility at Supervision  Short Term Goal 5: Demo Good safety throughout session without cuing       Therapy Time   Individual Concurrent Group Co-treatment   Time In 0909         Time Out 0931         Minutes 22      Co-eval w/PT   Timed Code Treatment Minutes: 8 Minutes       Lin Garza OTR/L

## 2024-06-13 NOTE — PLAN OF CARE
Problem: Safety - Adult  Goal: Free from fall injury  6/13/2024 0424 by Vilma Miranda RN  Outcome: Progressing  6/12/2024 1746 by Natalia Caro RN  Outcome: Progressing     Problem: Discharge Planning  Goal: Discharge to home or other facility with appropriate resources  6/13/2024 0424 by Vilma Miranda RN  Outcome: Progressing  6/12/2024 1746 by Natalia Caro RN  Outcome: Progressing     Problem: ABCDS Injury Assessment  Goal: Absence of physical injury  6/13/2024 0424 by Vilma Miranda RN  Outcome: Progressing  6/12/2024 1746 by Natalia Caro RN  Outcome: Progressing     Problem: Chronic Conditions and Co-morbidities  Goal: Patient's chronic conditions and co-morbidity symptoms are monitored and maintained or improved  6/13/2024 0424 by Vilma Miranda RN  Outcome: Progressing  6/12/2024 1746 by Natalia Caro RN  Outcome: Progressing

## 2024-06-13 NOTE — CARE COORDINATION
Transitional Planning  Spoke with patient and family at bedside, plan to return home with support from family has transportation, declines [facility].

## 2024-06-14 ENCOUNTER — APPOINTMENT (OUTPATIENT)
Age: 74
DRG: 041 | End: 2024-06-14
Payer: MEDICARE

## 2024-06-14 PROBLEM — I63.9 STROKE (HCC): Status: ACTIVE | Noted: 2024-06-12

## 2024-06-14 LAB — ECHO BSA: 1.87 M2

## 2024-06-14 PROCEDURE — 93325 DOPPLER ECHO COLOR FLOW MAPG: CPT | Performed by: INTERNAL MEDICINE

## 2024-06-14 PROCEDURE — C1764 EVENT RECORDER, CARDIAC: HCPCS | Performed by: INTERNAL MEDICINE

## 2024-06-14 PROCEDURE — 93312 ECHO TRANSESOPHAGEAL: CPT | Performed by: INTERNAL MEDICINE

## 2024-06-14 PROCEDURE — 2580000003 HC RX 258

## 2024-06-14 PROCEDURE — 6370000000 HC RX 637 (ALT 250 FOR IP): Performed by: INTERNAL MEDICINE

## 2024-06-14 PROCEDURE — 99152 MOD SED SAME PHYS/QHP 5/>YRS: CPT | Performed by: INTERNAL MEDICINE

## 2024-06-14 PROCEDURE — 0JH632Z INSERTION OF MONITORING DEVICE INTO CHEST SUBCUTANEOUS TISSUE AND FASCIA, PERCUTANEOUS APPROACH: ICD-10-PCS | Performed by: INTERNAL MEDICINE

## 2024-06-14 PROCEDURE — 2060000000 HC ICU INTERMEDIATE R&B

## 2024-06-14 PROCEDURE — 6360000002 HC RX W HCPCS: Performed by: INTERNAL MEDICINE

## 2024-06-14 PROCEDURE — 2709999900 HC NON-CHARGEABLE SUPPLY: Performed by: INTERNAL MEDICINE

## 2024-06-14 PROCEDURE — 93285 PRGRMG DEV EVAL SCRMS IP: CPT | Performed by: INTERNAL MEDICINE

## 2024-06-14 PROCEDURE — 99232 SBSQ HOSP IP/OBS MODERATE 35: CPT | Performed by: PSYCHIATRY & NEUROLOGY

## 2024-06-14 PROCEDURE — 93312 ECHO TRANSESOPHAGEAL: CPT

## 2024-06-14 PROCEDURE — 6370000000 HC RX 637 (ALT 250 FOR IP)

## 2024-06-14 PROCEDURE — 33285 INSJ SUBQ CAR RHYTHM MNTR: CPT | Performed by: INTERNAL MEDICINE

## 2024-06-14 PROCEDURE — 99223 1ST HOSP IP/OBS HIGH 75: CPT | Performed by: INTERNAL MEDICINE

## 2024-06-14 PROCEDURE — 99222 1ST HOSP IP/OBS MODERATE 55: CPT | Performed by: STUDENT IN AN ORGANIZED HEALTH CARE EDUCATION/TRAINING PROGRAM

## 2024-06-14 PROCEDURE — 99153 MOD SED SAME PHYS/QHP EA: CPT | Performed by: INTERNAL MEDICINE

## 2024-06-14 PROCEDURE — B246ZZ4 ULTRASONOGRAPHY OF RIGHT AND LEFT HEART, TRANSESOPHAGEAL: ICD-10-PCS | Performed by: INTERNAL MEDICINE

## 2024-06-14 DEVICE — MONITOR CRD 1.4 CC 3.4 GM INSERTABLE LINQ: Type: IMPLANTABLE DEVICE | Status: FUNCTIONAL

## 2024-06-14 RX ORDER — LIDOCAINE HYDROCHLORIDE 20 MG/ML
SOLUTION OROPHARYNGEAL PRN
Status: DISCONTINUED | OUTPATIENT
Start: 2024-06-14 | End: 2024-06-14 | Stop reason: HOSPADM

## 2024-06-14 RX ORDER — MIDAZOLAM HYDROCHLORIDE 1 MG/ML
INJECTION INTRAMUSCULAR; INTRAVENOUS PRN
Status: DISCONTINUED | OUTPATIENT
Start: 2024-06-14 | End: 2024-06-14 | Stop reason: HOSPADM

## 2024-06-14 RX ORDER — LABETALOL HYDROCHLORIDE 5 MG/ML
10 INJECTION, SOLUTION INTRAVENOUS EVERY 10 MIN PRN
Status: DISCONTINUED | OUTPATIENT
Start: 2024-06-14 | End: 2024-06-15 | Stop reason: HOSPADM

## 2024-06-14 RX ADMIN — SODIUM CHLORIDE, PRESERVATIVE FREE 10 ML: 5 INJECTION INTRAVENOUS at 08:07

## 2024-06-14 RX ADMIN — CLOPIDOGREL BISULFATE 75 MG: 75 TABLET ORAL at 18:04

## 2024-06-14 RX ADMIN — ASPIRIN 81 MG 81 MG: 81 TABLET ORAL at 08:07

## 2024-06-14 RX ADMIN — URSODIOL 250 MG: 250 TABLET, FILM COATED ORAL at 14:24

## 2024-06-14 RX ADMIN — URSODIOL 250 MG: 250 TABLET, FILM COATED ORAL at 19:33

## 2024-06-14 RX ADMIN — URSODIOL 250 MG: 250 TABLET, FILM COATED ORAL at 08:07

## 2024-06-14 RX ADMIN — SODIUM CHLORIDE, PRESERVATIVE FREE 10 ML: 5 INJECTION INTRAVENOUS at 19:34

## 2024-06-14 RX ADMIN — ATORVASTATIN CALCIUM 80 MG: 80 TABLET, FILM COATED ORAL at 19:33

## 2024-06-14 ASSESSMENT — ENCOUNTER SYMPTOMS
ABDOMINAL PAIN: 0
WHEEZING: 0
SHORTNESS OF BREATH: 0
ABDOMINAL DISTENTION: 0
COUGH: 0
STRIDOR: 0

## 2024-06-14 NOTE — PROGRESS NOTES
capsule by mouth 2 times daily Circulation and vein support    Provider, MD Ellyn   Omega-3 Fatty Acids (OMEGA 3 PO) Take 1 capsule by mouth daily  mg,  mg, DHA 50 mg     ProviderEllyn MD        Allergies:     Ciprofloxacin hcl, Coffee bean extract [coffea arabica], Darvon [propoxyphene], Dust mite extract, Levaquin [levofloxacin in d5w], Mixed grasses, Molds & smuts, Other, and Penicillins    Social History:     Tobacco:    reports that she has never smoked. She has never used smokeless tobacco.  Alcohol:      reports no history of alcohol use.  Drug Use:  reports no history of drug use.    Family History:     Family History   Problem Relation Age of Onset    Arthritis Mother     Heart Disease Mother         chf    Stroke Mother     Arthritis Father     Heart Disease Father     Diabetes Father     Cancer Father         prostate, melanoma    Cancer Sister         breast    Cancer Brother         kidney    Mental Retardation Paternal Grandmother        Review of Systems:     Review of Systems   Constitutional:  Negative for chills, fatigue and fever.   Respiratory:  Negative for cough, shortness of breath, wheezing and stridor.    Cardiovascular:  Negative for chest pain, palpitations and leg swelling.   Gastrointestinal:  Negative for abdominal distention and abdominal pain.   Neurological:  Positive for weakness and numbness. Negative for dizziness, tremors, seizures, syncope, facial asymmetry, speech difficulty, light-headedness and headaches.        Numbness left upper extremity, weakness left upper and lower extremity.       Physical Exam:   /69   Pulse (!) 45   Temp 98.4 °F (36.9 °C) (Oral)   Resp 12   Ht 1.626 m (5' 4\")   Wt 77.1 kg (170 lb)   LMP 10/28/2000   SpO2 96%   BMI 29.18 kg/m²   Temp (24hrs), Av °F (36.7 °C), Min:97.5 °F (36.4 °C), Max:98.4 °F (36.9 °C)    Recent Labs     24  0733   POCGLU 98       No intake or output data in the 24 hours ending  Diagnosis Date Noted    Cerebrovascular accident (CVA) due to bilateral embolism of middle cerebral arteries (HCC) [I63.413] 06/13/2024    Cerebrovascular accident (CVA) due to bilateral thrombosis of middle cerebral arteries (HCC) [I63.313] 06/12/2024    Ischemic stroke (HCC) [I63.9] 06/12/2024       73-year-old female with past medical history of breast cancer, dyslipidemia, hepatic cirrhosis, primary biliary cholangitis, nonalcoholic fatty liver disease who presents with left upper and lower extremity numbness and weakness.  Last known well 12 AM on 6/12/2024. NIH 3 for left facial droop, ataxia right arm, left arm drift.  She denies prior history of stroke and MI.  She takes aspirin 81 mg daily for primary prevention of cardiovascular events.  CT head without contrast showed mild hypodensities left basal ganglia, likely chronic microvascular disease.  No acute intracranial abnormalities noted.  CTA head and neck unremarkable. MRI brain showed acute infarcts right precentral and postcentral gyri and bilateral posterior parietal lobes, left greater than right.     Plan:     Acute infarcts right precentral and postcentral gyri and bilateral posterior parietal lobes  Mechanism: suspected cardioembolic   -Permissive HTN; Goal SBP < 180  -Labetalol 10 mg PRN   -Loaded with ASA, Plavix and continued on DAPT for 21 days followed by Plavix monotherapy  -MARCO A and loop today   -LDL 86, TG 76, Cholesterol 173; A1C 5.4%  -Continue Lipitor 80 mg nightly   -Continuous telemetry   -PT/OT/speech   -PMNR consult pending   -Stroke education     Hepatic cirrhosis  Primary biliary cholangitis  Nonalcoholic fatty liver disease   -Ammonia levels 26 wnl    DVT ppx: Lovenox 40 mg SC daily     Follow-up further recommendations after discussing case with the attending.  The plan was discussed with the patient, patient's family and the medical staff.   Consultations:   IP CONSULT TO CARDIOLOGY  IP CONSULT TO PHYSICAL MEDICINE

## 2024-06-14 NOTE — CONSULTS
QTc Calculation (Bazett) 435 ms    P Axis 57 degrees    R Axis 79 degrees    T Axis 62 degrees    Narrative    Sinus bradycardia  Incomplete right bundle branch block  Borderline ECG  No previous ECGs available        LABS:   CBC:   Recent Labs     06/12/24  0732 06/13/24  0537   WBC 13.3* 10.0   HGB 13.2 11.8*   HCT 41.2 36.2*    303     BMP:   Recent Labs     06/12/24 0732 06/12/24  0733     --    K 4.4  --    CO2 23  --    BUN 17  --    CREATININE 0.8 0.7   LABGLOM 74  --    GLUCOSE 97  --      BNP: No results for input(s): \"BNP\" in the last 72 hours.  PT/INR:   Recent Labs     06/12/24 0732   PROTIME 13.3   INR 1.0     APTT:  Recent Labs     06/12/24 0732   APTT 26.0     CARDIAC ENZYMES:  Recent Labs     06/12/24 0732   CKTOTAL 53       ASSESSMENT:  Got consulted for MARCO A/Loop by neurology  Ischemic stroke with suspected cardio embolic showering      RECOMMENDATIONS:  Discussed with the patient for need of MARCO A/Loop as requested by neurology. Benefits and risks explained and patient agrees to proceed. Consent obtained.  Proceed with MARCO A/Loop      Jayesh Barrios MD  Fellow, cardiovascular diseases  [facility]   6/14/2024, 3:32 PM      Attending Physician Statement:    I have discussed the care of  Ivy Rubio , including pertinent history and exam findings, with the Cardiology fellow/resident.     I have seen and examined the patient and the key elements of all parts of the encounter have been performed by me. I agree with the assessment, plan and orders as documented by the fellow/resident, after I modified exam findings and plan of treatments, and the final version is my approved version of the assessment.     Additional Comments:   CVA. Followed by neurology. Will plan for MARCO A and loop recorder. Discussed with patient. Agree to proceed and consent signed.

## 2024-06-14 NOTE — PLAN OF CARE
Problem: Safety - Adult  Goal: Free from fall injury  6/14/2024 0330 by Vilma Miranda RN  Outcome: Progressing  6/13/2024 1802 by Usha Park RN  Outcome: Progressing  Flowsheets (Taken 6/13/2024 0725)  Free From Fall Injury: Instruct family/caregiver on patient safety     Problem: Discharge Planning  Goal: Discharge to home or other facility with appropriate resources  6/14/2024 0330 by Vilma Miranda RN  Outcome: Progressing  6/13/2024 1802 by Usha Park RN  Outcome: Progressing  Flowsheets (Taken 6/13/2024 0837)  Discharge to home or other facility with appropriate resources:   Identify barriers to discharge with patient and caregiver   Arrange for needed discharge resources and transportation as appropriate   Identify discharge learning needs (meds, wound care, etc)   Refer to discharge planning if patient needs post-hospital services based on physician order or complex needs related to functional status, cognitive ability or social support system     Problem: ABCDS Injury Assessment  Goal: Absence of physical injury  6/14/2024 0330 by Vilma Miranda RN  Outcome: Progressing  6/13/2024 1802 by Usha Park RN  Outcome: Progressing  Flowsheets (Taken 6/13/2024 0725)  Absence of Physical Injury: Implement safety measures based on patient assessment     Problem: Chronic Conditions and Co-morbidities  Goal: Patient's chronic conditions and co-morbidity symptoms are monitored and maintained or improved  6/14/2024 0330 by Vilma Miranda RN  Outcome: Progressing  6/13/2024 1802 by Usha Park RN  Outcome: Progressing  Flowsheets (Taken 6/13/2024 0837)  Care Plan - Patient's Chronic Conditions and Co-Morbidity Symptoms are Monitored and Maintained or Improved:   Monitor and assess patient's chronic conditions and comorbid symptoms for stability, deterioration, or improvement   Collaborate with multidisciplinary team to address chronic and comorbid conditions and prevent

## 2024-06-14 NOTE — PROCEDURES
[facility]      Procedure Note  LOOP RECORDER IMPLANT      Ivy Rubio (73 y.o., female)  1950  6/14/2024    Procedure: Loop Recorder Implant    Operators:  Primary: Corazon Link MD (Attending Physician)  Assistant: Jayesh Barrios MD       Model # Serial #   Recorder Medtronic [id number]     Sedation monitoring  Loop recorder Implant    Indication: CVA    Procedure:  After the usual preparation of the left neck and chest, the patient was draped in the usual sterile manner.  Local anesthesia was infused below the left clavicle in the third left intercostal space just lateral to the sternum. An incision was made and dilated laterally towards the breast. The Loop recorder System was advanced using the standard techniques, and positioned successfully with no bleeding or complication. Amplitude 0.68mV    Impression / Device:  Successful Implantation of: Reveal / Loop Recorder    Plan:  Telemetry monitoring  Interrogate recorder before discharge  Wound check at [facility] in 7 days      Jayesh Barrios MD  Fellow, cardiovascular diseases  [facility]

## 2024-06-14 NOTE — PROCEDURES
[facility]  Transesophageal Echocardiogram       Today's Date:  6/14/2024  Indication:   CVA    Operators:  Primary:   Corazon Link MD (Attending Physician)  Assistant:   Jayesh Barrios MD (Cardiovascular Fellow)    Pre Procedure Conscious Sedation Data:  ASA Class:    [] I [x] II [] III [] IV    Mallampati Class:  [] I [x] II [] III [] IV    Procedure:  Patient seen and examined. History and Physical reviewed. Labs reviewed.    After informed consent was obtained with explanation of the risks and benefits, the patient was brought to cardiac cath lab. All sedation was administered by the cardiologist. The oropharynx was pre-anesthesized with viscous lidocaine and cetacaine spray. The ultrasound probe was passed without any difficulty.    MARCO A findings:  LA:  Normal  KAYLIN:  No thrombus  RA:  Normal  RV:   Normal  LV:  Normal  Estimated LVEF:  55-60%  Aorta:   Mild atheromatous disease arch  Pericardium: No pericardial effusion  Septum:  No intracardiac shunt via color Doppler. No intracardiac shunt via injection of agitated saline.     Valves:  Mitral Valve: Structurally normal. Trivial regurgitation is identified.  Aortic Valve: The aortic valve is trileaflet and opens adequately. No regurgitiation is identified.  Tricuspid valve: Structurally normal. No regurgitation is identified.  Pulmonary valve: Normal. No significant regurgitation    No valvular vegetations or thrombus identified.    Summary:   1. A MARCO A was performed without complications.  2. LVEF 55-60%  3. No thrombus or valvular vegetation identified  4. Proceed with ILR      Jayesh Barrios MD  Fellow, cardiovascular diseases  [facility]

## 2024-06-14 NOTE — CONSULTS
Physical Medicine & Rehabilitation  Consult Note      Admitting Physician:  Mendoza Ortiz MD    Primary Care Provider:  Marilee Garcia MD     Reason for Consult:  Acute Inpatient Rehabilitation    Chief Complaint:  Left-sided numbness and weakness    History of Present Illness:  Referring Provider is requesting an evaluation for appropriate placement upon discharge from acute care. History from chart review and patient.    Ivy Rubio is a 73 y.o. female with history of primary biliary cholangitis, liver cirrhosis, breast cancer s/p bilateral mastectomy, HLD admitted to [facility] on 6/12/2024.      She initially presented with left-sided numbness and weakness.  NIHSS 3.  MRI brain showed acute infarcts within the cortex of the right precentral and postcentral gyri and bilateral posterior parietal lobes, left greater than right.    She reports weakness and numbness/tingling, primarily in the left hand.  She also notes mild headache.    Review of Systems:  Review of Systems   Constitutional:  Negative for fever.   Respiratory:  Negative for shortness of breath.    Cardiovascular:  Negative for chest pain.   Gastrointestinal:  Negative for abdominal pain.   Neurological:  Positive for sensory change, weakness and headaches. Negative for dizziness.      Premorbid function:  Independent    Current function:    Physical Therapy    Restrictions/Precautions: Up as Tolerated  Other position/activity restrictions: SBP<220    Bed mobility  Bed Mobility Comments: Not assessed. Pt seated EOB upon writer entrance and seated in chair upon writer exit.    Transfers  Sit to Stand: Contact guard assistance (hand held assist.)  Stand to Sit: Stand by assistance  Comment: Pt performed STS x2 throughout session including toilet transfer. No LOB noted. Pt performed all transfers without AD.    Ambulation  Surface: Level tile  Device: No Device  Assistance: Minimal assistance, Contact guard assistance  Quality of Gait: Pt  SINUSES: The paranasal sinuses and mastoid air cells are clear. BONES/SOFT TISSUES: Bone marrow signal intensity is normal.     1. Acute infarcts within the cortex of the right precentral and postcentral gyri and bilateral posterior parietal lobes, left greater than right. 2. No acute intracranial hemorrhage. 3. Mild chronic small vessel ischemic changes.     XR CHEST PORTABLE    Result Date: 6/12/2024  EXAMINATION: ONE XRAY VIEW OF THE CHEST 6/12/2024 9:18 am COMPARISON: None. HISTORY: ORDERING SYSTEM PROVIDED HISTORY: new onset confusion in elderly patient TECHNOLOGIST PROVIDED HISTORY: new onset confusion in elderly patient Reason for Exam: Upright port, extremity weakness, numbness, confusion FINDINGS: There is suggestion of a mild infiltrate in the left lower lobe.  There is no effusion or pneumothorax. The cardiomediastinal silhouette is without acute process. The osseous structures are without acute process.     No acute process. Suggestion of mild infiltrate in the left lower lobe.  This may represent pneumonia.     CTA HEAD NECK W CONTRAST    Result Date: 6/12/2024  EXAMINATION: CTA OF THE HEAD AND NECK WITH CONTRAST 6/12/2024 7:37 am: TECHNIQUE: CTA of the head and neck was performed with the administration of intravenous contrast. Multiplanar reformatted images are provided for review.  MIP images are provided for review. Stenosis of the internal carotid arteries measured using NASCET criteria. Automated exposure control, iterative reconstruction, and/or weight based adjustment of the mA/kV was utilized to reduce the radiation dose to as low as reasonably achievable. COMPARISON: None. HISTORY: ORDERING SYSTEM PROVIDED HISTORY: LKW midnight, left sided weakness TECHNOLOGIST PROVIDED HISTORY: LKW midnight, left sided weakness Decision Support Exception - unselect if not a suspected or confirmed emergency medical condition->Emergency Medical Condition (MA) Reason for Exam: LKW midnight, left sided weakness

## 2024-06-14 NOTE — PLAN OF CARE
Problem: Safety - Adult  Goal: Free from fall injury  Outcome: Progressing  Flowsheets (Taken 6/14/2024 0712)  Free From Fall Injury: Instruct family/caregiver on patient safety     Problem: Discharge Planning  Goal: Discharge to home or other facility with appropriate resources  Outcome: Progressing  Flowsheets (Taken 6/14/2024 0800)  Discharge to home or other facility with appropriate resources:   Identify barriers to discharge with patient and caregiver   Arrange for needed discharge resources and transportation as appropriate   Identify discharge learning needs (meds, wound care, etc)   Refer to discharge planning if patient needs post-hospital services based on physician order or complex needs related to functional status, cognitive ability or social support system     Problem: ABCDS Injury Assessment  Goal: Absence of physical injury  Outcome: Progressing  Flowsheets (Taken 6/14/2024 0712)  Absence of Physical Injury: Implement safety measures based on patient assessment     Problem: Chronic Conditions and Co-morbidities  Goal: Patient's chronic conditions and co-morbidity symptoms are monitored and maintained or improved  Outcome: Progressing  Flowsheets (Taken 6/14/2024 0800)  Care Plan - Patient's Chronic Conditions and Co-Morbidity Symptoms are Monitored and Maintained or Improved:   Monitor and assess patient's chronic conditions and comorbid symptoms for stability, deterioration, or improvement   Collaborate with multidisciplinary team to address chronic and comorbid conditions and prevent exacerbation or deterioration

## 2024-06-15 VITALS
HEIGHT: 64 IN | SYSTOLIC BLOOD PRESSURE: 116 MMHG | BODY MASS INDEX: 29.02 KG/M2 | WEIGHT: 170 LBS | DIASTOLIC BLOOD PRESSURE: 62 MMHG | RESPIRATION RATE: 17 BRPM | OXYGEN SATURATION: 98 % | TEMPERATURE: 97.6 F | HEART RATE: 52 BPM

## 2024-06-15 PROCEDURE — 2580000003 HC RX 258

## 2024-06-15 PROCEDURE — 6360000002 HC RX W HCPCS

## 2024-06-15 PROCEDURE — 99239 HOSP IP/OBS DSCHRG MGMT >30: CPT | Performed by: PSYCHIATRY & NEUROLOGY

## 2024-06-15 PROCEDURE — 99233 SBSQ HOSP IP/OBS HIGH 50: CPT | Performed by: NURSE PRACTITIONER

## 2024-06-15 PROCEDURE — 6370000000 HC RX 637 (ALT 250 FOR IP)

## 2024-06-15 RX ORDER — ASPIRIN 81 MG/1
81 TABLET, CHEWABLE ORAL DAILY
Qty: 18 TABLET | Refills: 0 | Status: SHIPPED | OUTPATIENT
Start: 2024-06-15 | End: 2024-07-03

## 2024-06-15 RX ORDER — CLOPIDOGREL BISULFATE 75 MG/1
75 TABLET ORAL DAILY
Qty: 30 TABLET | Refills: 3 | Status: SHIPPED | OUTPATIENT
Start: 2024-06-16

## 2024-06-15 RX ORDER — ATORVASTATIN CALCIUM 80 MG/1
80 TABLET, FILM COATED ORAL NIGHTLY
Qty: 30 TABLET | Refills: 3 | Status: SHIPPED | OUTPATIENT
Start: 2024-06-15

## 2024-06-15 RX ADMIN — CLOPIDOGREL BISULFATE 75 MG: 75 TABLET ORAL at 07:58

## 2024-06-15 RX ADMIN — SODIUM CHLORIDE, PRESERVATIVE FREE 10 ML: 5 INJECTION INTRAVENOUS at 07:59

## 2024-06-15 RX ADMIN — URSODIOL 250 MG: 250 TABLET, FILM COATED ORAL at 09:04

## 2024-06-15 RX ADMIN — ENOXAPARIN SODIUM 40 MG: 100 INJECTION SUBCUTANEOUS at 07:58

## 2024-06-15 RX ADMIN — ASPIRIN 81 MG 81 MG: 81 TABLET ORAL at 07:58

## 2024-06-15 ASSESSMENT — ENCOUNTER SYMPTOMS
DIARRHEA: 0
WHEEZING: 0
CONSTIPATION: 0
NAUSEA: 0
STRIDOR: 0
ABDOMINAL DISTENTION: 0
SHORTNESS OF BREATH: 0
ABDOMINAL PAIN: 0

## 2024-06-15 NOTE — DISCHARGE SUMMARY
[facility]     Department of Neurology    INPATIENT DISCHARGE SUMMARY        Patient Identification:  Ivy Rubio is a 73 y.o. female.  :  1950  MRN: [id number]     Acct: [id number]   Admit Date:  2024  Discharge date and time: No discharge date for patient encounter.   Attending Provider: Mendoza Ortiz MD                                     Admission Diagnoses:   Ischemic stroke (HCC) [I63.9]  Cerebrovascular accident (CVA) due to bilateral embolism of middle cerebral arteries (HCC) [I63.413]    Discharge Diagnoses:   Principal Problem:    Ischemic stroke (HCC)  Active Problems:    Cerebrovascular accident (CVA) due to bilateral thrombosis of middle cerebral arteries (HCC)    Cerebrovascular accident (CVA) due to bilateral embolism of middle cerebral arteries (HCC)    Stroke (HCC)  Resolved Problems:    * No resolved hospital problems. *       Consults:   Cardiology, PMNR    Brief Inpatient course:    73-year-old female with past medical history of breast cancer, dyslipidemia, hepatic cirrhosis, primary biliary cholangitis, nonalcoholic fatty liver disease who presents with left upper and lower extremity numbness and weakness.  Last known well 12 AM on 2024.  Per patient she went to sleep around 12 AM and woke up with symptoms.  She was noted to have left facial droop and left-sided weakness per patient's son who was not present at bedside.  The patient notes increased confusion, left arm numbness and weakness.  NIH 3 for left facial droop, ataxia right arm, left arm drift.  She denies prior history of stroke and MI.  She takes aspirin 81 mg daily for primary prevention of cardiovascular events.       CT head without contrast showed mild hypodensities left basal ganglia, likely chronic microvascular disease.  No acute intracranial abnormalities noted.  CTA head and neck unremarkable.     Loaded with ASA, Plavix and continued on DAPT.      The patient has a history

## 2024-06-15 NOTE — PLAN OF CARE
Problem: Safety - Adult  Goal: Free from fall injury  6/15/2024 0403 by Vilma Miranda RN  Outcome: Progressing  6/14/2024 1830 by Usha Park RN  Outcome: Progressing  Flowsheets (Taken 6/14/2024 0712)  Free From Fall Injury: Instruct family/caregiver on patient safety     Problem: Discharge Planning  Goal: Discharge to home or other facility with appropriate resources  6/15/2024 0403 by Vilma Miranda RN  Outcome: Progressing  6/14/2024 1830 by Usha Park RN  Outcome: Progressing  Flowsheets (Taken 6/14/2024 0800)  Discharge to home or other facility with appropriate resources:   Identify barriers to discharge with patient and caregiver   Arrange for needed discharge resources and transportation as appropriate   Identify discharge learning needs (meds, wound care, etc)   Refer to discharge planning if patient needs post-hospital services based on physician order or complex needs related to functional status, cognitive ability or social support system     Problem: ABCDS Injury Assessment  Goal: Absence of physical injury  6/15/2024 0403 by Vilma Miranda RN  Outcome: Progressing  6/14/2024 1830 by Usha Park RN  Outcome: Progressing  Flowsheets (Taken 6/14/2024 0712)  Absence of Physical Injury: Implement safety measures based on patient assessment     Problem: Chronic Conditions and Co-morbidities  Goal: Patient's chronic conditions and co-morbidity symptoms are monitored and maintained or improved  6/15/2024 0403 by Vilma Miranda RN  Outcome: Progressing  6/14/2024 1830 by Usha Park RN  Outcome: Progressing  Flowsheets (Taken 6/14/2024 0800)  Care Plan - Patient's Chronic Conditions and Co-Morbidity Symptoms are Monitored and Maintained or Improved:   Monitor and assess patient's chronic conditions and comorbid symptoms for stability, deterioration, or improvement   Collaborate with multidisciplinary team to address chronic and comorbid conditions and prevent

## 2024-06-15 NOTE — PROGRESS NOTES
[facility]   Progress Note                   Date:   6/15/2024  Patient name: Ivy Rubio  Date of admission:  6/12/2024  7:29 AM  MRN:   [id number]  YOB: 1950  PCP: Marilee Garcia MD    Reason for Admission: Ischemic stroke (HCC) [I63.9]  Cerebrovascular accident (CVA) due to bilateral embolism of middle cerebral arteries (HCC) [I63.413]    Subjective:       Clinical Changes / Abnormalities: Pt seen and examined in the room.  Patient resting in bed. Pt denies any CP or sob.  Labs, vitals and tele reviewed- SR/SB    Medications:   Scheduled Meds:   aspirin  81 mg Oral Daily    clopidogrel  75 mg Oral Daily    atorvastatin  80 mg Oral Nightly    sodium chloride flush  5-40 mL IntraVENous 2 times per day    enoxaparin  40 mg SubCUTAneous Daily    ursodiol  250 mg Oral TID     Continuous Infusions:   sodium chloride       CBC:   Recent Labs     06/13/24  0537   WBC 10.0   HGB 11.8*        BMP:  No results for input(s): \"NA\", \"K\", \"CL\", \"CO2\", \"BUN\", \"CREATININE\", \"GLUCOSE\" in the last 72 hours.  Hepatic: No results for input(s): \"AST\", \"ALT\", \"BILITOT\", \"ALKPHOS\" in the last 72 hours.    Invalid input(s): \"ALB\"  Troponin: No results for input(s): \"TROPHS\" in the last 72 hours.  BNP: No results for input(s): \"BNP\" in the last 72 hours.  Lipids: No results for input(s): \"CHOL\", \"HDL\" in the last 72 hours.    Invalid input(s): \"LDLCALCU\"  INR: No results for input(s): \"INR\" in the last 72 hours.    Objective:   Vitals: /62   Pulse (!) 44   Temp 98.5 °F (36.9 °C) (Oral)   Resp 13   Ht 1.626 m (5' 4\")   Wt 77.1 kg (170 lb)   LMP 10/28/2000   SpO2 96%   BMI 29.18 kg/m²   General appearance: alert and cooperative with exam  HEENT: Head: Normocephalic, no lesions, without obvious abnormality.  Neck: no JVD, trachea midline, no adenopathy  Lungs: Clear to auscultation  Heart: Regular rate and rhythm, s1/s2 auscultated, no murmurs  Abdomen: soft, non-tender, bowel sounds

## 2024-06-15 NOTE — PROGRESS NOTES
AVS reviewed with patient. All questions answered and patient verbalizes understanding. PIV removed. Patient dressed per self. Patient to front entrance via wheelchair accompanied by nursing aid to waiting private vehicle. Destination home.

## 2024-06-18 ENCOUNTER — HOSPITAL ENCOUNTER (OUTPATIENT)
Dept: OCCUPATIONAL THERAPY | Age: 74
Setting detail: THERAPIES SERIES
Discharge: HOME | End: 2024-06-18
Payer: MEDICARE

## 2024-06-18 LAB — ECHO BSA: 1.87 M2

## 2024-06-18 PROCEDURE — 97110 THERAPEUTIC EXERCISES: CPT

## 2024-06-18 PROCEDURE — 97165 OT EVAL LOW COMPLEX 30 MIN: CPT

## 2024-06-18 NOTE — THERAPY EVALUATION
[] [facility]  Outpatient Rehabilitation &  Therapy  [address]  [phone]  [phone] [] [facility]  Outpatient Rehabilitation &  Therapy  [address]  P: [phone]  F: [phone] [x] [facility] Outpatient Rehabilitation &  Therapy  [address]  P: [phone]  F: [phone]       Occupational Therapy Neurological Evaluation    Date:  2024  Patient: Ivy Rubio  : 1950  MRN: [id number]  Physician: Adwoa Dill MD    Insurance: Medicare Part A & B   secondary MMO  Medical Diagnosis: I63.313 (ICD-10-CM) - Cerebrovascular accident (CVA) due to bilateral thrombosis of middle cerebral arteries (HCC)   Rehab Codes: stiffness in hand M25.64,, numbness R20.2,, or lack of coordination R27.8,  Next 's appt.: unknown  Date of symptom onset: 24    Subjective:   CC: LUE weakness and decreased coordination. Thumb, index, middle finger is numb. Able to move them now but clumsy.  Pertinent medical hx: Per neurology admission note: Patient she went to sleep around 12 AM and woke up with symptoms.  She was noted to have left facial droop and left-sided weakness per patient's son who was not present at bedside.  The patient notes increased confusion, left arm numbness and weakness.  NIH 3 for left facial droop, ataxia right arm, left arm drift.  She denies prior history of stroke and MI.       Just for discharge from acute  Previous occupational therapy?   [x]No       []Yes, date of most recent, facility:     Precautions: Reports no falls, PT following    PMHx: [] Unremarkable [] Diabetes [] HTN  [] Pacemaker   [] MI/Heart Problems [] Cancer [] Arthritis [] Other:              [x] Refer to full medical chart  In EPIC   Past Medical History:   Diagnosis Date    Abnormal EKG 2015    Chest pain     rare    Diverticulosis     History of bilateral breast cancer 10/2015    Primary biliary cholangitis (HCC)

## 2024-06-19 ENCOUNTER — HOSPITAL ENCOUNTER (OUTPATIENT)
Dept: PHYSICAL THERAPY | Age: 74
Setting detail: THERAPIES SERIES
Discharge: HOME | End: 2024-06-19
Payer: MEDICARE

## 2024-06-19 PROCEDURE — 97110 THERAPEUTIC EXERCISES: CPT

## 2024-06-19 PROCEDURE — 97161 PT EVAL LOW COMPLEX 20 MIN: CPT

## 2024-06-19 NOTE — THERAPY EVALUATION
[x] [facility]   Outpatient Rehabilitation & Therapy  [address]  P: [phone]   F: [phone]        Physical Therapy Neurological Evaluation    Date:  2024  Patient: Ivy Rubio  : 1950  MRN: [id number]  Physician: Adwoa Dill MD (Dr. Chadwick or Angel = attending)   Insurance: Medicare A & B// O  - Based on [place]   Medical Diagnosis:   Diagnosis   I63.313 (ICD-10-CM) - Cerebrovascular accident (CVA) due to bilateral thrombosis of middle cerebral arteries (HCC)      Rehab Codes:  R53.1 weakness   Date of symptom onset: 24   Next 's appt.: 24 - PCP     Precautions: hx of CVA     Subjective:   Pt arrives to clinic ambulatory. Pt is agreeable to PT evaluation     Pt went to hospital 24 with L sided facial droop and L sided weakness. Found on CT to have mild hypodensities left basal ganglia, likely chronic microvascular disease. Found on MRI of brain showed acute infarcts right precentral and postcentral gyri and bilateral posterior parietal lobes, left greater than right. Pt was discharged 6/15/24 to home. Pt notes she has been doing most of her ADLs. Denies any falls or stumbles. She feels at times shoes feel stuck. Still feeling a mild L sided weakness.     She has hx of breast cancer that was treated with pill medication. She has some neuropathy in B feet -- numbness & tingling noted.     Falls history: no falls      Patient stated Goals: to ensure she is able to do all she can     Pain:  [] Yes  [x] No   Location: --   Pain Rating: (0-10 scale) --/10  Pain descriptors: --   Pain altered Tx:  [] Yes  [] No  Action:    Previous PT hx: none        PMHx: [] Unremarkable [] Diabetes [] HTN  [] Pacemaker   [] MI/Heart Problems [x] Cancer (breast) [] Arthritis [] Other:              [] Refer to full medical chart  In EPIC   Past Medical History:   Diagnosis Date    Abnormal EKG 2015    Chest pain     rare    Diverticulosis     History of bilateral

## 2024-06-20 ENCOUNTER — HOSPITAL ENCOUNTER (OUTPATIENT)
Age: 74
Setting detail: SPECIMEN
Discharge: HOME | End: 2024-06-20

## 2024-06-20 DIAGNOSIS — K76.0 FATTY LIVER DISEASE, NONALCOHOLIC: ICD-10-CM

## 2024-06-20 DIAGNOSIS — E78.5 DYSLIPIDEMIA: ICD-10-CM

## 2024-06-21 ENCOUNTER — TELEPHONE (OUTPATIENT)
Dept: INFUSION THERAPY | Age: 74
End: 2024-06-21

## 2024-06-21 NOTE — TELEPHONE ENCOUNTER
Pt called stating she was told by her PCP to notify Dr. Rai that she had a stroke last week, and to ask if the arimidex could have caused this. Pt states she has been off the arimidex for several years, but her PCP still wanted to make sure. Will route to Dr. Rai and call pt back with recommendations.

## 2024-06-22 NOTE — FLOWSHEET NOTE
Stroke Follow up Phone Call    Lakesha marshall is Renetta from [facility] stroke team calling to see how you are doing since your d/c.       Medication List        START taking these medications      atorvastatin 80 MG tablet  Commonly known as: LIPITOR  Take 1 tablet by mouth nightly     clopidogrel 75 MG tablet  Commonly known as: PLAVIX  Take 1 tablet by mouth daily            CONTINUE taking these medications      aspirin 81 MG chewable tablet  Take 1 tablet by mouth daily for 18 days     CALCIUM & MAGNESIUM CARBONATES PO     ciclopirox 8 % solution  Commonly known as: PENLAC  Apply topically nightly. Remove once weekly with alcohol or nail polish remover.     clindamycin 150 MG capsule  Commonly known as: CLEOCIN     CO Q-10 PO     fexofenadine 180 MG tablet  Commonly known as: Allegra Allergy  Take 1 tablet by mouth daily     Ginger Root 550 MG Caps     ibuprofen 600 MG tablet  Commonly known as: ADVIL;MOTRIN  Take 1 tablet by mouth every 6 hours as needed for Pain     Magnesium 500 MG Caps     Mastectomy Bra Misc  by Does not apply route     METAMUCIL PO     MULTI VITAMIN/MINERALS PO     NONFORMULARY     NONFORMULARY     NONFORMULARY     NONFORMULARY     OMEGA 3 PO     Systane Ultra 0.4-0.3 % ophthalmic solution  Generic drug: polyethyl glycol-propyl glycol 0.4-0.3 %  Place 1 drop into both eyes as needed for Dry Eyes     ursodiol 500 MG tablet  Commonly known as: ACTIGALL  TAKE 1/2 TABLET BY MOUTH 3 TIMES DAILY               Where to Get Your Medications        These medications were sent to [facility] [address] [place] [facility] [address] [address] - P [phone] - F [phone]  [address] [place] [address]      Phone: [phone]   aspirin 81 MG chewable tablet  atorvastatin 80 MG tablet  clopidogrel 75 MG tablet         Can you tell me what medications you are taking?  [x]   Yes  []   No    Do you have any questions about your medications?   []   Yes  [x]   No    All medications

## 2024-06-24 ENCOUNTER — HOSPITAL ENCOUNTER (OUTPATIENT)
Dept: OCCUPATIONAL THERAPY | Age: 74
Setting detail: THERAPIES SERIES
Discharge: HOME | End: 2024-06-24
Payer: MEDICARE

## 2024-06-24 PROCEDURE — 97110 THERAPEUTIC EXERCISES: CPT

## 2024-06-24 NOTE — FLOWSHEET NOTE
Functional Index Score 20 or more points to promote increased functional abilities  Improve fine motor/coordination L hand evident by decreased 9 hole score by 25 seconds  Pt report independence with opening jars and containers         Patient Goals: To use left hand as normally as possible    Pt. Education:  [x] Yes  [] No  [x] Reviewed Prior HEP/Ed  Method of Education: [x] Verbal  [x] Demo  [] Written  Re: HEP  Comprehension of Education:  [x] Verbalizes understanding.  [x] Demonstrates understanding.  [] Needs review.  [x] Demonstrates/verbalizes HEP/Ed previously given.      Plan: [x] Continue current frequency toward short and long term goals.  [x] Specific Instructions for subsequent treatments: strength, FMC   [] Other:       Time In: 945  Time Out: 1028       Electronically signed by:  CONSTANZA Reyes/TOMÁS

## 2024-06-25 ENCOUNTER — OFFICE VISIT (OUTPATIENT)
Dept: PODIATRY | Age: 74
End: 2024-06-25

## 2024-06-25 VITALS — WEIGHT: 174 LBS | HEIGHT: 64 IN | BODY MASS INDEX: 29.71 KG/M2

## 2024-06-25 DIAGNOSIS — B35.1 DERMATOPHYTOSIS OF NAIL: Primary | ICD-10-CM

## 2024-06-25 DIAGNOSIS — M79.605 PAIN IN BOTH LOWER EXTREMITIES: ICD-10-CM

## 2024-06-25 DIAGNOSIS — I73.9 PVD (PERIPHERAL VASCULAR DISEASE) (HCC): ICD-10-CM

## 2024-06-25 DIAGNOSIS — M79.604 PAIN IN BOTH LOWER EXTREMITIES: ICD-10-CM

## 2024-06-25 DIAGNOSIS — R60.0 EDEMA OF LOWER EXTREMITY: ICD-10-CM

## 2024-06-26 ENCOUNTER — HOSPITAL ENCOUNTER (OUTPATIENT)
Dept: PHYSICAL THERAPY | Age: 74
Setting detail: THERAPIES SERIES
Discharge: HOME | End: 2024-06-26
Payer: MEDICARE

## 2024-06-26 ENCOUNTER — HOSPITAL ENCOUNTER (OUTPATIENT)
Dept: OCCUPATIONAL THERAPY | Age: 74
Setting detail: THERAPIES SERIES
Discharge: HOME | End: 2024-06-26
Payer: MEDICARE

## 2024-06-26 PROCEDURE — 97110 THERAPEUTIC EXERCISES: CPT

## 2024-06-26 PROCEDURE — 97112 NEUROMUSCULAR REEDUCATION: CPT

## 2024-06-26 NOTE — FLOWSHEET NOTE
extension. Place back on -   Graded clips   Picking up 3rae0wv blocks  12x yellow, 12x red, 12x green X added   buttons   Flipping buttons over x20,  all 20 hold in hand, place down one at a time X added   Velcroboard    #5 roll x4 with index and middle finger X added                 Other:      [facility]  Access Code: [email or web address]  URL: [email or web address]  Date: 06/18/2024  Prepared by: Nat Soriano     Exercises  - Putty Squeezes  - 1 x daily - 7 x weekly  - Rolling Putty on Table  - 1 x daily - 7 x weekly  - Tip Pinch with Putty  - 1 x daily - 7 x weekly  - Finger Pinch and Pull with Putty  - 1 x daily - 7 x weekly        Treatment Charges: Mins Units   []  Modalities:      []  Ultrasound     [x]  Ther Exercise 42 3   []  Manual Therapy     []  Ther Activities     []  Orthotic fit/train     []  Orthotic recheck     []  Other     Total Billable time 42          Assessment: [x] Progressing toward goals. Pt lucrecia session without adverse response. Added activities as above. Reviewed buttons and playing cards for FMC at home    [] No change.  [] Other     [x] Patient would continue to benefit from skilled occupational therapy services in order to: Improve  functional /grasp, I with ADLS, Motor control/Tone in UE, ROM, Strength, Activity tolerance, and Coordination deficit in order to perform ADLs with increased safety, ensure good follow through, improve functional use of UE in ADL performance, and return to PLOF        STG/LTG     Short Term Goals: (  8    Treatments)  Increase AROM end ranges LUE to reach back for bathing and fastening bra.  Increase strength    by 10lbs to improve ADL function  Pinch by 2lbs to improve ADL function  Increase function:UE Functional Index Score 10 or more points to promote increased functional abilities  Improve fine motor/coordination L hand evident by decreased 9 hole score by 10 seconds  Patient to be independent with home exercise program as demonstrated by

## 2024-06-26 NOTE — FLOWSHEET NOTE
[facility] Outpatient Physical Therapy   [address]   Phone: [phone]   Fax: [phone]    Physical Therapy Daily Treatment Note      Date:  2024  Patient Name:  Ivy Rubio    :  1950  MRN: [id number]  Physician: Adwoa Dill MD (Dr. Chadwick or Angel = attending)                         Insurance: Medicare A & B// MMO  - Based [place]   Medical Diagnosis:   Diagnosis   I63.313 (ICD-10-CM) - Cerebrovascular accident (CVA) due to bilateral thrombosis of middle cerebral arteries (HCC)                 Rehab Codes:  R53.1 weakness   Date of symptom onset: 24   Next 's appt.: 24 - PCP   Visit# / total visits: 2/5  Cancels/No Shows: 0/0    Precautions: Hx of CVA     Subjective:  Patient reporting from PT with no new complaints. States she is \"mostly\" compliant with her HEP and is walking a lot for exercise, but has not been doing her exercises as much as she believes she should.     Pain:  [] Yes  [x] No Location: NA Pain Rating: (0-10 scale) 0/10  Pain altered Tx:  [] No  [] Yes  Action:  Comments:    Objective:    INTERVENTIONS  Reps/ Time Weight/ Level Completed  Today Comments               EXERCISES            Standing:       3 way hip kicks  15x ea  Red  x Done without UE support to challenge balance    Squat with TB 10x  Red x Cues to sit back more    Resisted side step  3x15ft ea Red  x     Step ups fwd/lat 2x10 ea  8in x     Fwd lunges to bosu  10x ea  x     Lateral lunges to bosu  10x ea   x            Stair negotiation 1 flight  x R rail only   Stair negotiation w/ object in hand 1 flight   x3 4# ball x No UE support ascend, LUE on rail descend   Gait with fitter board + ball balance 95ft 2# ball x No LOB, good control with balancing ball on board                      Patient Education/Home Program :   : Access Code: [email or web address] // Exercises - RED THERABAND   - Standing Hip Flexion with Resistance Loop  - 1 x daily - 7 x weekly - 3 sets

## 2024-07-01 ENCOUNTER — HOSPITAL ENCOUNTER (OUTPATIENT)
Dept: OCCUPATIONAL THERAPY | Age: 74
Setting detail: THERAPIES SERIES
Discharge: HOME | End: 2024-07-01
Payer: MEDICARE

## 2024-07-01 PROCEDURE — 97110 THERAPEUTIC EXERCISES: CPT

## 2024-07-01 NOTE — FLOWSHEET NOTE
[facility]  Outpatient Rehabilitation &  Therapy  [address]     [phone]  F: [phone]   [] [facility]  Outpatient Rehabilitation &  Therapy  [address]  P: [phone]  F: [phone] [] [facility]  Outpatient Rehabilitation &  Therapy  [address]  P: [phone]  F: [phone]  [x] [facility]   Outpatient Rehabilitation & Therapy  [address]  P: [phone]   F: [phone]     Occupational Therapy Daily Treatment Note    Date:  2024  Patient Name:  Ivy Rubio    :  1950  MRN: [id number]  Physician: Physician: Awdoa Dill MD                                  Insurance: Medicare Part A & B   secondary MMO  Medical Diagnosis: I63.313 (ICD-10-CM) - Cerebrovascular accident (CVA) due to bilateral thrombosis of middle cerebral arteries (HCC)   Rehab Codes: stiffness in hand M25.64,, numbness R20.2,, or lack of coordination R27.8,  Next 's appt.: unknown  Date of symptom onset: 24     Visit# / total visits: ; Progress note for Medicare patient due at visit 10    Cancels/No Shows: 0/0      Subjective:    Pain:  [] Yes  [x] No Location: N/A Pain Rating: (0-10 scale) 0/10  Pain altered Tx:  [x] No  [] Yes  Action:  Pt Comments: Pt states she is having more tingling in hand. Able to feel warm water now.    Objective:  Modalities:  Precautions:  Exercises:  EXERCISE    REPS/     TIME  WEIGHT/    LEVEL COMMENTS Complete x   HEP   Pink theraputty - , roll, pinch, pull   Buttons/coins  Cards HEP    Added  added   ROM   Digits left hand X    Flex bar 2x10 red Roll, isolated finger walk,U, reverse U, C, reverse C, twist X    Keyhole pegs   Left - in by 5 out by 5 x   Graded clips  Yellow-black Place on vertical pole and remove x   Green putty   Press cone into putty lrg and small end x   Velcro checkerboard   Remove black rotating between fingers for pinch grasp, remove

## 2024-07-01 NOTE — PROGRESS NOTES
stockings for brian LE and instructed pt to wear at all times when walking. Pt may take NSAIDs as needed. Recommend patient to go to ED if there is shortness of breath or pain worsens to calf compression.    Nails 1-10 were debrided in length and thickness sharply with a nail nipper and  without incident. All labs were reviewed and all imaging including the above findings were reviewed PRIOR to the patients arrival and with the patient today.    Previous patient encounter was reviewed.  Encounters from the patients other medical providers were reviewed and noted.   I personally interpreted the imaging and labs and discussed the results with the patient. Time was spent educating the patient and their families/caregivers on proper care of the feet and ankles.  All the above diagnosis were addressed at todays visit and all questions were answered.  A total of 20 minutes was spent with this patients encounter which included charting after the patients visit    Pt will follow up in 9 weeks or sooner if any problems arise. Diagnosis was discussed with the pt and all of their questions were answered in detail. Proper foot hygiene and care was discussed with the pt. Patient to check feet daily and contact the office with any questions/problems/concerns.  Other comorbidity noted and will be managed by PCP.  Pain waiver discussed with patient and confirmed.   6/25/2024      Electronically signed by Connie Ghosh DPM on 7/1/2024 at 7:20 AM  6/25/2024

## 2024-07-02 ENCOUNTER — HOSPITAL ENCOUNTER (OUTPATIENT)
Dept: PHYSICAL THERAPY | Age: 74
Setting detail: THERAPIES SERIES
Discharge: HOME | End: 2024-07-02
Payer: MEDICARE

## 2024-07-02 PROCEDURE — 97110 THERAPEUTIC EXERCISES: CPT

## 2024-07-02 PROCEDURE — 97112 NEUROMUSCULAR REEDUCATION: CPT

## 2024-07-03 ENCOUNTER — APPOINTMENT (OUTPATIENT)
Dept: OCCUPATIONAL THERAPY | Age: 74
End: 2024-07-03
Payer: MEDICARE

## 2024-07-03 ENCOUNTER — APPOINTMENT (OUTPATIENT)
Dept: PHYSICAL THERAPY | Age: 74
End: 2024-07-03
Payer: MEDICARE

## 2024-07-08 ENCOUNTER — HOSPITAL ENCOUNTER (OUTPATIENT)
Dept: OCCUPATIONAL THERAPY | Age: 74
Setting detail: THERAPIES SERIES
Discharge: HOME | End: 2024-07-08
Payer: MEDICARE

## 2024-07-08 PROCEDURE — 97110 THERAPEUTIC EXERCISES: CPT

## 2024-07-08 NOTE — FLOWSHEET NOTE
[facility]  Outpatient Rehabilitation &  Therapy  [address]     [phone]  F: [phone]   [] [facility]  Outpatient Rehabilitation &  Therapy  [address]   [address]  P: [phone]  F: [phone] [] [facility]  Outpatient Rehabilitation &  Therapy  [address]  P: [phone]  F: [phone]  [x] [facility]   Outpatient Rehabilitation & Therapy  [address]  P: [phone]   F: [phone]     Occupational Therapy Daily Treatment Note    Date:  2024  Patient Name:  Ivy Rubio    :  1950  MRN: [id number]  Physician: Physician: Adwoa Dill MD                                  Insurance: Medicare Part A & B   secondary MMO  Medical Diagnosis: I63.313 (ICD-10-CM) - Cerebrovascular accident (CVA) due to bilateral thrombosis of middle cerebral arteries (HCC)   Rehab Codes: stiffness in hand M25.64,, numbness R20.2,, or lack of coordination R27.8,  Next 's appt.: unknown  Date of symptom onset: 24     Visit# / total visits: ; Progress note for Medicare patient due at visit 10    Cancels/No Shows: 0/0      Subjective:    Pain:  [] Yes  [x] No Location: N/A Pain Rating: (0-10 scale) 0/10  Pain altered Tx:  [x] No  [] Yes  Action:  Pt Comments: Pt states digits 1-3 are still numb but better    Objective:  Modalities:  Precautions:  Exercises:  EXERCISE    REPS/     TIME  WEIGHT/    LEVEL COMMENTS Complete x   HEP   Pink theraputty - , roll, pinch, pull   Buttons/coins  Cards HEP         ROM   Digits left hand X    Flex bar 2x10 red Roll, isolated finger walk,U, reverse U, C, reverse C, twist -   Keyhole pegs   Left - in by 5 out by 5 x   Graded clips  Yellow-black Place on vertical pole and remove x   Green putty   Press cone into putty lrg and small end x   Velcro checkerboard   Remove black rotating between fingers for pinch grasp, remove white rotating fingers by extension.

## 2024-07-10 ENCOUNTER — HOSPITAL ENCOUNTER (OUTPATIENT)
Dept: OCCUPATIONAL THERAPY | Age: 74
Setting detail: THERAPIES SERIES
Discharge: HOME | End: 2024-07-10
Payer: MEDICARE

## 2024-07-10 ENCOUNTER — HOSPITAL ENCOUNTER (OUTPATIENT)
Dept: PHYSICAL THERAPY | Age: 74
Setting detail: THERAPIES SERIES
Discharge: HOME | End: 2024-07-10
Payer: MEDICARE

## 2024-07-10 PROCEDURE — 97530 THERAPEUTIC ACTIVITIES: CPT

## 2024-07-10 PROCEDURE — 97110 THERAPEUTIC EXERCISES: CPT

## 2024-07-10 PROCEDURE — 97112 NEUROMUSCULAR REEDUCATION: CPT

## 2024-07-10 PROCEDURE — 97116 GAIT TRAINING THERAPY: CPT

## 2024-07-10 NOTE — FLOWSHEET NOTE
treatments)   Pt will increase strength of all major muscle groups of the LEs to 5/5 or greater demonstrating improved strength needed to maximize safety and efficiency with mobility tasks such as gait, transfers and stairs.     Pt will be independent with home program addressing strength, flexibility and balance to maximize gains made in therapy to improve overall functional capacity for mobility.    Pt will report she can go up and down stairs at least 3 times   with light UE support and carrying something to be able to access bedroom and laundry         Plan: [x] Continue per plan of care.   [] Other:      Treatment Charges: Mins Units   []  Modalities     [x]  Ther Exercise 18 1   []  Manual Therapy     []  Ther Activities     []  Aquatics     [x]  Neuromuscular 13 1   [] Vasocompression     [x] Gait Training 10 1   [] Dry needling        [] 1 or 2 muscles        [] 3 or more muscles     []  Other     Total Billable timed codes 41 3     Time In: 10:31       Time Out:  1112    Electronically signed by:  Jose Miguel Centeno PT

## 2024-07-10 NOTE — FLOWSHEET NOTE
[facility]  Outpatient Rehabilitation &  Therapy  [address]     [phone]  F: [phone]   [] [facility]  Outpatient Rehabilitation &  Therapy  [address]   [address]  P: [phone]  F: [phone] [] [facility]  Outpatient Rehabilitation &  Therapy  [address]  P: [phone]  F: [phone]  [x] [facility]   Outpatient Rehabilitation & Therapy  [address]  P: [phone]   F: [phone]     Occupational Therapy Daily Treatment Note    Date:  7/10/2024  Patient Name:  Ivy Rubio    :  1950  MRN: [id number]  Physician: Physician: Adwoa Dill MD                                  Insurance: Medicare Part A & B   secondary MMO  Medical Diagnosis: I63.313 (ICD-10-CM) - Cerebrovascular accident (CVA) due to bilateral thrombosis of middle cerebral arteries (HCC)   Rehab Codes: stiffness in hand M25.64,, numbness R20.2,, or lack of coordination R27.8,  Next 's appt.: unknown  Date of symptom onset: 24     Visit# / total visits: ; Progress note for Medicare patient due at visit 10    Cancels/No Shows: 0/0      Subjective:    Pain:  [] Yes  [x] No Location: N/A Pain Rating: (0-10 scale) 0/10  Pain altered Tx:  [x] No  [] Yes  Action:  Pt Comments: Pt states numbness is improving    Objective:  Modalities:  Precautions:  Exercises:  EXERCISE    REPS/     TIME  WEIGHT/    LEVEL COMMENTS Complete x   HEP   Pink theraputty - , roll, pinch, pull   Buttons/coins  Cards HEP         ROM   Digits left hand X    Flex bar 2x10 red Roll, isolated finger walk,U, reverse U, C, reverse C, twist -   Keyhole pegs   Left - in by 5 out by all 25 X dropped 2   Graded clips  Yellow-black Place on vertical pole and remove x   Green putty   Press cone into putty lrg and small end    Velcro checkerboard   Remove black rotating between fingers for pinch grasp, remove white rotating fingers by extension.

## 2024-07-15 ENCOUNTER — HOSPITAL ENCOUNTER (OUTPATIENT)
Dept: OCCUPATIONAL THERAPY | Age: 74
Setting detail: THERAPIES SERIES
Discharge: HOME | End: 2024-07-15
Payer: MEDICARE

## 2024-07-15 ENCOUNTER — HOSPITAL ENCOUNTER (OUTPATIENT)
Dept: PHYSICAL THERAPY | Age: 74
Setting detail: THERAPIES SERIES
Discharge: HOME | End: 2024-07-15
Payer: MEDICARE

## 2024-07-15 PROCEDURE — 97140 MANUAL THERAPY 1/> REGIONS: CPT

## 2024-07-15 PROCEDURE — 97110 THERAPEUTIC EXERCISES: CPT

## 2024-07-15 NOTE — THERAPY DISCHARGE
Iontophoresis: 4 mg/mL Dexamethasone Sodium Phosphate  mAmin  01568   [x] Therapeutic Activity  41074 [] Vasopneumatic cold with compression  98793               [x] Gait Training    70834 [] Ultrasound        89245   [x] Neuromuscular Re-education  80246 [] Electrical Stimulation Unattended  98744   [] Manual Therapy  18958 [] Electrical Stimulation Attended  96815   [x] Instruction in HEP       [] Dry Needling   [] Aquatic Therapy           65619 [] Cold/hotpack     [] Wheelchair Training   02180 [] PT re-evaluation (if needed)   47552          Treatment Charges: Mins Units   []  Modalities     [x]  Ther Exercise 30 2   []  Manual Therapy     []  Ther Activities     []  Aquatics     []  Neuromuscular     [] Vasocompression     [] Gait Training     [] Dry needling        [] 1 or 2 muscles        [] 3 or more muscles     []  Other     Total Billable timed codes 30 2     Time In: 1132       Time Out:   1202    Electronically signed by:  Jose Miguel Centeno PT         2

## 2024-07-15 NOTE — FLOWSHEET NOTE
clips  Yellow-black Place on vertical pole and remove -   Green putty   Press cone into putty lrg and small end -   Velcro checkerboard   Remove black rotating between fingers for pinch grasp, remove white rotating fingers by extension. Place back on -   Graded clips   Picking up 5jgn2fy blocks  12x yellow, 12x red, 12x green -   buttons   Flipping buttons over x20,  all 20 hold in hand, place down one at a time x   Velcroboard    #6 roll x4 with index and middle finger -   Power web   red , opposition, push/pull -   stereognosis   6/7 identified reaching into pillow case -   BTE   Tool# 131 3# small steering wheel cw/ccw 45sec left hand  Tool#131 3# cw/ccw B hands cw/ccw 60sec  Tool#181 rope pull 8lbs 120 sec  Tool#162 gripping 50in lbs 60sec  Tool#162 pinch 35in lb 70sec  Tool#202 key 6lbs cw/ccw 30sec  X  X (increased time)  X  X  X (increased time)  x   Tan/red pegs   3 rows flipping pegs over    Ergometer  7min BUE -   ADL cupboard   Remove plates 8 small, 6 large from cupboard and return with left hand -   Paperclip chain   Issued and reviewed for HEP 10 clips -   Valpar 4   6 lrg and 6 small -          Other:      HEP  Access Code: [email or web address]  URL: [email or web address]  Date: 06/18/2024  Prepared by: Nat Soriano     Exercises  - Putty Squeezes  - 1 x daily - 7 x weekly  - Rolling Putty on Table  - 1 x daily - 7 x weekly  - Tip Pinch with Putty  - 1 x daily - 7 x weekly  - Finger Pinch and Pull with Putty  - 1 x daily - 7 x weekly        Treatment Charges: Mins Units Time in - time out   []  Modalities:       []  Ultrasound      [x]  Ther Exercise 33 2 10:57-11:30   [x]  Manual Therapy 10 1 10:47-10:57   []  Ther Activities      []  Orthotic fit/train      []  Orthotic recheck      []  Other      Total Billable time 43 3          Assessment: [x] Progressing toward goals. PROM & finger blocking exercises completed lt hand to decrease tightness. Pt completes therapeutic exercises for lt

## 2024-07-17 ENCOUNTER — HOSPITAL ENCOUNTER (OUTPATIENT)
Dept: OCCUPATIONAL THERAPY | Age: 74
Setting detail: THERAPIES SERIES
Discharge: HOME | End: 2024-07-17
Payer: MEDICARE

## 2024-07-17 PROCEDURE — 97110 THERAPEUTIC EXERCISES: CPT

## 2024-07-17 NOTE — PROGRESS NOTES
[facility]  Outpatient Rehabilitation &  Therapy  [address]     [phone]  F: [phone]   [] [facility]  Outpatient Rehabilitation &  Therapy  [address]   [address]  P: [phone]  F: [phone] [] [facility]  Outpatient Rehabilitation &  Therapy  [address]  P: [phone]  F: [phone]  [x] [facility]   Outpatient Rehabilitation & Therapy  [address]  P: [phone]   F: [phone]     Occupational Therapy Daily Treatment Note/Progress Note    Date:  2024  Patient Name:  Ivy Rubio    :  1950  MRN: [id number]  Physician: Physician: Adwoa Dill MD                                  Insurance: Medicare Part A & B   secondary MMO  Medical Diagnosis: I63.313 (ICD-10-CM) - Cerebrovascular accident (CVA) due to bilateral thrombosis of middle cerebral arteries (HCC)   Rehab Codes: stiffness in hand M25.64,, numbness R20.2,, or lack of coordination R27.8,  Next 's appt.: unknown  Date of symptom onset: 24     Visit# / total visits: ; Progress note for Medicare patient due at visit 10    Cancels/No Shows: 0/0  Reporting Period: 24-24    Subjective:    Pain:  [] Yes  [x] No Location:  Pain Rating: (0-10 scale) 0/10  Pain altered Tx:  [x] No  [] Yes  Action:  Pt Comments: Pt states numbness/tightness in index and thumb  Pt expresses still struggling with being able to pick objects up with thumb and index. Cutting food is still difficult (holding fork). Difficulty with tying shoes and doing buttons      Objective:  Modalities:  Precautions:  Exercises:  EXERCISE    REPS/     TIME  WEIGHT/    LEVEL COMMENTS Complete x   HEP   Pink theraputty - , roll, pinch, pull   Buttons/coins  Cards HEP - issued [facility] handout for home. Reviewed activities   ROM   Digits left hand AROM  flex/extension, abduct/adduct, PROM  lt hand/wrist, PIP blocking exercise 2 sets 10x X    Flex

## 2024-07-22 ENCOUNTER — HOSPITAL ENCOUNTER (OUTPATIENT)
Age: 74
Discharge: HOME | End: 2024-07-22
Payer: MEDICARE

## 2024-07-22 ENCOUNTER — HOSPITAL ENCOUNTER (OUTPATIENT)
Dept: ULTRASOUND IMAGING | Age: 74
Discharge: HOME | End: 2024-07-24
Attending: INTERNAL MEDICINE
Payer: MEDICARE

## 2024-07-22 ENCOUNTER — APPOINTMENT (OUTPATIENT)
Dept: OCCUPATIONAL THERAPY | Age: 74
End: 2024-07-22
Payer: MEDICARE

## 2024-07-22 DIAGNOSIS — K74.3 PRIMARY BILIARY CIRRHOSIS (HCC): ICD-10-CM

## 2024-07-22 LAB
ALBUMIN SERPL-MCNC: 4.1 G/DL (ref 3.5–5.2)
ALP SERPL-CCNC: 131 U/L (ref 35–104)
ALT SERPL-CCNC: 78 U/L (ref 5–33)
AST SERPL-CCNC: 49 U/L
BASOPHILS # BLD: 0.1 K/UL (ref 0–0.2)
BASOPHILS NFR BLD: 1 % (ref 0–2)
BILIRUB DIRECT SERPL-MCNC: 0.1 MG/DL
BILIRUB INDIRECT SERPL-MCNC: 0.2 MG/DL (ref 0–1)
BILIRUB SERPL-MCNC: 0.3 MG/DL (ref 0.3–1.2)
EOSINOPHIL # BLD: 0.4 K/UL (ref 0–0.4)
EOSINOPHILS RELATIVE PERCENT: 5 % (ref 0–4)
ERYTHROCYTE [DISTWIDTH] IN BLOOD BY AUTOMATED COUNT: 13.2 % (ref 11.5–14.9)
HCT VFR BLD AUTO: 41.7 % (ref 36–46)
HGB BLD-MCNC: 13.7 G/DL (ref 12–16)
LYMPHOCYTES NFR BLD: 3 K/UL (ref 1–4.8)
LYMPHOCYTES RELATIVE PERCENT: 34 % (ref 24–44)
MCH RBC QN AUTO: 31 PG (ref 26–34)
MCHC RBC AUTO-ENTMCNC: 32.8 G/DL (ref 31–37)
MCV RBC AUTO: 94.6 FL (ref 80–100)
MONOCYTES NFR BLD: 0.6 K/UL (ref 0.1–1.3)
MONOCYTES NFR BLD: 7 % (ref 1–7)
NEUTROPHILS NFR BLD: 53 % (ref 36–66)
NEUTS SEG NFR BLD: 4.6 K/UL (ref 1.3–9.1)
PLATELET # BLD AUTO: 470 K/UL (ref 150–450)
PMV BLD AUTO: 8.5 FL (ref 6–12)
PROT SERPL-MCNC: 7.9 G/DL (ref 6.4–8.3)
RBC # BLD AUTO: 4.41 M/UL (ref 4–5.2)
WBC OTHER # BLD: 8.7 K/UL (ref 3.5–11)

## 2024-07-22 PROCEDURE — 80076 HEPATIC FUNCTION PANEL: CPT

## 2024-07-22 PROCEDURE — 76705 ECHO EXAM OF ABDOMEN: CPT

## 2024-07-22 PROCEDURE — 36415 COLL VENOUS BLD VENIPUNCTURE: CPT

## 2024-07-22 PROCEDURE — 85025 COMPLETE CBC W/AUTO DIFF WBC: CPT

## 2024-07-22 PROCEDURE — 83516 IMMUNOASSAY NONANTIBODY: CPT

## 2024-07-23 LAB — MITOCHONDRIA M2 IGG SER-ACNC: 56 U/ML (ref 0–4)

## 2024-07-24 ENCOUNTER — HOSPITAL ENCOUNTER (OUTPATIENT)
Dept: OCCUPATIONAL THERAPY | Age: 74
Setting detail: THERAPIES SERIES
Discharge: HOME | End: 2024-07-24
Payer: MEDICARE

## 2024-07-24 PROCEDURE — 97110 THERAPEUTIC EXERCISES: CPT

## 2024-07-24 PROCEDURE — 97530 THERAPEUTIC ACTIVITIES: CPT

## 2024-07-24 NOTE — FLOWSHEET NOTE
[x] Demo  [] Written  Re: HEP/new activities  Comprehension of Education:  [x] Verbalizes understanding.  [x] Demonstrates understanding.  [] Needs review.  [x] Demonstrates/verbalizes HEP/Ed previously given.      Plan: [x] Continue current frequency toward short and long term goals.  [x] Specific Instructions for subsequent treatments: strength, FMC   [] Other:       Time In: 947  Time Out: 1032    Electronically signed by:  CONSTANZA Reyes/TOMÁS

## 2024-07-26 ENCOUNTER — HOSPITAL ENCOUNTER (OUTPATIENT)
Dept: OCCUPATIONAL THERAPY | Age: 74
Setting detail: THERAPIES SERIES
Discharge: HOME | End: 2024-07-26
Payer: MEDICARE

## 2024-07-26 PROCEDURE — 97110 THERAPEUTIC EXERCISES: CPT

## 2024-07-26 NOTE — FLOWSHEET NOTE
[facility]  Outpatient Rehabilitation &  Therapy  [address]     [phone]  F: [phone]   [] [facility]  Outpatient Rehabilitation &  Therapy  [address]   [address]  P: [phone]  F: [phone] [] [facility]  Outpatient Rehabilitation &  Therapy  [address]  P: [phone]  F: [phone]  [x] [facility]   Outpatient Rehabilitation & Therapy  [address]  P: [phone]   F: [phone]     Occupational Therapy Daily Treatment Note    Date:  2024  Patient Name:  Ivy Rubio    :  1950  MRN: [id number]  Physician: Physician: Adwoa Dill MD                                  Insurance: Medicare Part A & B   secondary MMO  Medical Diagnosis: I63.313 (ICD-10-CM) - Cerebrovascular accident (CVA) due to bilateral thrombosis of middle cerebral arteries (HCC)   Rehab Codes: stiffness in hand M25.64,, numbness R20.2,, or lack of coordination R27.8,  Next 's appt.: unknown  Date of symptom onset: 24     Visit# / total visits: 10/16; Progress note for Medicare patient due at visit 10    Cancels/No Shows: 0/0      Subjective:    Pain:  [] Yes  [x] No Location:  Pain Rating: (0-10 scale) 0/10  Pain altered Tx:  [x] No  [] Yes  Action:  Pt Comments: Pt states numbness/tightness in index and thumb. Middle finger is less tight and not too much numbness anymore      Objective:  Modalities:  Precautions:  Exercises:  EXERCISE    REPS/     TIME  WEIGHT/    LEVEL COMMENTS Complete x   HEP   Pink theraputty - , roll, pinch, pull   Buttons/coins  Cards HEP - issued [facility] handout for home. Reviewed activities   ROM   Digits left hand AROM  flex/extension, abduct/adduct, PROM  lt hand/wrist, PIP blocking exercise 2 sets 10x X    Flex bar 2x10 red Roll, isolated finger walk,U, reverse U, C, reverse C, twist -   Keyhole pegs   Left - in by 5 out by all 25 -   Graded clips  red-black Place on

## 2024-07-29 ENCOUNTER — HOSPITAL ENCOUNTER (OUTPATIENT)
Dept: OCCUPATIONAL THERAPY | Age: 74
Setting detail: THERAPIES SERIES
Discharge: HOME | End: 2024-07-29
Payer: MEDICARE

## 2024-07-29 PROCEDURE — 97530 THERAPEUTIC ACTIVITIES: CPT

## 2024-07-29 PROCEDURE — 97110 THERAPEUTIC EXERCISES: CPT

## 2024-07-29 NOTE — FLOWSHEET NOTE
[facility]  Outpatient Rehabilitation &  Therapy  [address]     [phone]  F: [phone]   [] [facility]  Outpatient Rehabilitation &  Therapy  [address]  P: [phone]  F: [phone] [] [facility]  Outpatient Rehabilitation &  Therapy  [address]  P: [phone]  F: [phone]  [x] [facility]   Outpatient Rehabilitation & Therapy  [address]  P: [phone]   F: [phone]     Occupational Therapy Daily Treatment Note    Date:  2024  Patient Name:  Ivy Rubio    :  1950  MRN: [id number]  Physician: Physician: Adwoa Dill MD                                  Insurance: Medicare Part A & B   secondary MMO  Medical Diagnosis: I63.313 (ICD-10-CM) - Cerebrovascular accident (CVA) due to bilateral thrombosis of middle cerebral arteries (HCC)   Rehab Codes: stiffness in hand M25.64,, numbness R20.2,, or lack of coordination R27.8,  Next 's appt.: unknown  Date of symptom onset: 24     Visit# / total visits: ; Progress note for Medicare patient due at visit 10    Cancels/No Shows: 0/0      Subjective:    Pain:  [] Yes  [x] No Location:  Pain Rating: (0-10 scale) 0/10  Pain altered Tx:  [x] No  [] Yes  Action:  Pt Comments: Pt states numbness/tightness in index and thumb. Index has been achy but no pain      Objective:  Modalities:  Precautions:  Exercises:  EXERCISE    REPS/     TIME  WEIGHT/    LEVEL COMMENTS Complete x   HEP   Pink theraputty - , roll, pinch, pull   Buttons/coins  Cards HEP    ROM   Digits left hand AROM  flex/extension, abduct/adduct, PROM  lt hand/wrist, PIP blocking exercise 2 sets 10x X    Flex bar 2x10 red Roll, isolated finger walk,U, reverse U, C, reverse C, twist -   Keyhole pegs   Left - in by 5 out by 5 x   Graded clips  red-black Place on vertical pole and remove -   Green putty   Press cone into putty lrg and small end  Press

## 2024-07-31 ENCOUNTER — HOSPITAL ENCOUNTER (OUTPATIENT)
Dept: OCCUPATIONAL THERAPY | Age: 74
Setting detail: THERAPIES SERIES
Discharge: HOME | End: 2024-07-31
Payer: MEDICARE

## 2024-07-31 PROCEDURE — 97110 THERAPEUTIC EXERCISES: CPT

## 2024-07-31 NOTE — FLOWSHEET NOTE
[facility]  Outpatient Rehabilitation &  Therapy  [address]     [phone]  F: [phone]   [] [facility]  Outpatient Rehabilitation &  Therapy  [address]   [address]  P: [phone]  F: [phone] [] [facility]  Outpatient Rehabilitation &  Therapy  [address]  P: [phone]  F: [phone]  [x] [facility]   Outpatient Rehabilitation & Therapy  [address]  P: [phone]   F: [phone]     Occupational Therapy Daily Treatment Note    Date:  2024  Patient Name:  Ivy Rubio    :  1950  MRN: [id number]  Physician: Physician: Adwoa Dill MD                                  Insurance: Medicare Part A & B   secondary MMO  Medical Diagnosis: I63.313 (ICD-10-CM) - Cerebrovascular accident (CVA) due to bilateral thrombosis of middle cerebral arteries (HCC)   Rehab Codes: stiffness in hand M25.64,, numbness R20.2,, or lack of coordination R27.8,  Next 's appt.: unknown  Date of symptom onset: 24     Visit# / total visits: ; Progress note for Medicare patient due at visit 10    Cancels/No Shows: 0/0      Subjective:    Pain:  [] Yes  [x] No Location:  Pain Rating: (0-10 scale) 0/10  Pain altered Tx:  [x] No  [] Yes  Action:  Pt Comments: Pt states still having difficulty with sensation in left index/thumb and occasional middle finger      Objective:  Modalities:  Precautions:  Exercises:  EXERCISE    REPS/     TIME  WEIGHT/    LEVEL COMMENTS Complete x   HEP   Pink theraputty - , roll, pinch, pull   Buttons/coins  Cards HEP    ROM   Digits left hand AROM  flex/extension, abduct/adduct, PROM  lt hand/wrist, PIP blocking exercise 2 sets 10x X    Flex bar 2x10 red Roll, isolated finger walk,U, reverse U, C, reverse C, twist -   Keyhole pegs   Left - in by 5 out by 5 x   Graded clips  red-black Place on vertical pole and remove -   Green putty   Press cone into putty lrg and

## 2024-08-02 ENCOUNTER — HOSPITAL ENCOUNTER (OUTPATIENT)
Age: 74
Discharge: HOME | End: 2024-08-02
Payer: MEDICARE

## 2024-08-02 ENCOUNTER — OFFICE VISIT (OUTPATIENT)
Dept: GASTROENTEROLOGY | Age: 74
End: 2024-08-02
Payer: MEDICARE

## 2024-08-02 VITALS
DIASTOLIC BLOOD PRESSURE: 79 MMHG | TEMPERATURE: 97.6 F | HEART RATE: 56 BPM | WEIGHT: 173 LBS | HEIGHT: 64 IN | SYSTOLIC BLOOD PRESSURE: 133 MMHG | BODY MASS INDEX: 29.53 KG/M2

## 2024-08-02 DIAGNOSIS — K74.3 PRIMARY BILIARY CHOLANGITIS (HCC): ICD-10-CM

## 2024-08-02 DIAGNOSIS — K74.3 PRIMARY BILIARY CHOLANGITIS (HCC): Primary | ICD-10-CM

## 2024-08-02 PROCEDURE — G8427 DOCREV CUR MEDS BY ELIG CLIN: HCPCS | Performed by: INTERNAL MEDICINE

## 2024-08-02 PROCEDURE — 1036F TOBACCO NON-USER: CPT | Performed by: INTERNAL MEDICINE

## 2024-08-02 PROCEDURE — 36415 COLL VENOUS BLD VENIPUNCTURE: CPT

## 2024-08-02 PROCEDURE — 83516 IMMUNOASSAY NONANTIBODY: CPT

## 2024-08-02 PROCEDURE — 1123F ACP DISCUSS/DSCN MKR DOCD: CPT | Performed by: INTERNAL MEDICINE

## 2024-08-02 PROCEDURE — 1090F PRES/ABSN URINE INCON ASSESS: CPT | Performed by: INTERNAL MEDICINE

## 2024-08-02 PROCEDURE — G8399 PT W/DXA RESULTS DOCUMENT: HCPCS | Performed by: INTERNAL MEDICINE

## 2024-08-02 PROCEDURE — 86256 FLUORESCENT ANTIBODY TITER: CPT

## 2024-08-02 PROCEDURE — 99214 OFFICE O/P EST MOD 30 MIN: CPT | Performed by: INTERNAL MEDICINE

## 2024-08-02 PROCEDURE — 3017F COLORECTAL CA SCREEN DOC REV: CPT | Performed by: INTERNAL MEDICINE

## 2024-08-02 PROCEDURE — G8417 CALC BMI ABV UP PARAM F/U: HCPCS | Performed by: INTERNAL MEDICINE

## 2024-08-02 NOTE — PROGRESS NOTES
Housing in the Last Year: Not on file     Number of Places Lived in the Last Year: Not on file     Unstable Housing in the Last Year: No       REVIEW OF SYSTEMS: A 12-point review of systemswas obtained and pertinent positives and negatives were enumerated above in the history of present illness. All other reviewed systems / symptoms were negative.    Review of Systems        LABORATORY DATA: Reviewed  Lab Results   Component Value Date    WBC 8.7 07/22/2024    HGB 13.7 07/22/2024    HCT 41.7 07/22/2024    MCV 94.6 07/22/2024     (H) 07/22/2024     06/12/2024    K 4.4 06/12/2024     06/12/2024    CO2 23 06/12/2024    BUN 17 06/12/2024    CREATININE 0.7 06/12/2024    BILITOT 0.3 07/22/2024    ALKPHOS 131 (H) 07/22/2024    AST 49 (H) 07/22/2024    ALT 78 (H) 07/22/2024    INR 1.0 06/12/2024         Lab Results   Component Value Date    RBC 4.41 07/22/2024    HGB 13.7 07/22/2024    MCV 94.6 07/22/2024    MCH 31.0 07/22/2024    MCHC 32.8 07/22/2024    RDW 13.2 07/22/2024    MPV 8.5 07/22/2024    BASOPCT 1 07/22/2024    LYMPHSABS 3.00 07/22/2024    MONOSABS 0.60 07/22/2024    NEUTROABS 4.60 07/22/2024    EOSABS 0.40 07/22/2024    BASOSABS 0.10 07/22/2024         DIAGNOSTIC TESTING:     US LIVER    Result Date: 7/24/2024  EXAMINATION: RIGHT UPPER QUADRANT ULTRASOUND 7/22/2024 7:45 am COMPARISON: 2022 HISTORY: ORDERING SYSTEM PROVIDED HISTORY: Primary biliary cirrhosis (HCC) TECHNOLOGIST PROVIDED HISTORY: This procedure can be scheduled via [employer].  Access your [employer] account by visiting [employer]. FINDINGS: Patient body habitus limits the study, as there is increased attenuation of the ultrasound beam. This decreases sensitivity and specificity. LIVER:  Coarsened heterogeneous echotexture seen throughout liver.  No biliary ductal dilatation.  Cyst is seen in the liver measuring up to 5.  1 cm.  Portal vein flow is hepatopetal. BILIARY SYSTEM: Gallbladder is unremarkable without evidence of

## 2024-08-04 LAB
SMOOTH MUSCLE ANTIBODY: 21 UNITS (ref 0–19)
SMOOTH MUSCLE IGG TITR SER: ABNORMAL {TITER}

## 2024-08-05 ENCOUNTER — HOSPITAL ENCOUNTER (OUTPATIENT)
Dept: OCCUPATIONAL THERAPY | Age: 74
Setting detail: THERAPIES SERIES
Discharge: HOME | End: 2024-08-05
Payer: MEDICARE

## 2024-08-05 PROCEDURE — 97110 THERAPEUTIC EXERCISES: CPT

## 2024-08-05 NOTE — FLOWSHEET NOTE
score by 25 seconds MET  Pt report independence with opening jars and containers         Patient Goals: To use left hand as normally as possible    Pt. Education:  [x] Yes  [] No  [] Reviewed Prior HEP/Ed  Method of Education: [x] Verbal  [] Demo  [] Written  Re: new activities  Comprehension of Education:  [x] Verbalizes understanding.  [x] Demonstrates understanding.  [] Needs review.  [] Demonstrates/verbalizes HEP/Ed previously given.      Plan: [x] Continue current frequency toward short and long term goals.  [x] Specific Instructions for subsequent treatments: strength, FMC   [] Other:       Time In: 900  Time Out:945    Electronically signed by:  CONSTANZA Reyes/TOMÁS

## 2024-08-07 ENCOUNTER — OFFICE VISIT (OUTPATIENT)
Dept: NEUROLOGY | Age: 74
End: 2024-08-07

## 2024-08-07 ENCOUNTER — HOSPITAL ENCOUNTER (OUTPATIENT)
Dept: OCCUPATIONAL THERAPY | Age: 74
Setting detail: THERAPIES SERIES
Discharge: HOME | End: 2024-08-07
Payer: MEDICARE

## 2024-08-07 VITALS
SYSTOLIC BLOOD PRESSURE: 118 MMHG | WEIGHT: 173 LBS | BODY MASS INDEX: 29.53 KG/M2 | HEIGHT: 64 IN | HEART RATE: 57 BPM | OXYGEN SATURATION: 96 % | DIASTOLIC BLOOD PRESSURE: 78 MMHG

## 2024-08-07 DIAGNOSIS — I63.9 ISCHEMIC STROKE (HCC): Primary | ICD-10-CM

## 2024-08-07 DIAGNOSIS — K74.3 PRIMARY BILIARY CIRRHOSIS (HCC): ICD-10-CM

## 2024-08-07 PROBLEM — Z86.73 HISTORY OF STROKE: Status: ACTIVE | Noted: 2024-08-07

## 2024-08-07 PROCEDURE — 97110 THERAPEUTIC EXERCISES: CPT

## 2024-08-07 RX ORDER — ATORVASTATIN CALCIUM 40 MG/1
40 TABLET, FILM COATED ORAL DAILY
Qty: 30 TABLET | Refills: 3 | Status: SHIPPED | OUTPATIENT
Start: 2024-08-07

## 2024-08-07 ASSESSMENT — ENCOUNTER SYMPTOMS
RESPIRATORY NEGATIVE: 1
ALLERGIC/IMMUNOLOGIC NEGATIVE: 1
EYES NEGATIVE: 1
GASTROINTESTINAL NEGATIVE: 1

## 2024-08-07 NOTE — PROGRESS NOTES
[address]  [place] [address]  P: [phone]  F: [phone]    NEUROLOGY CLINIC NOTE     PATIENT NAME: Ivy Rubio  PATIENT MRN: [id number]  PRIMARY CARE PHYSICIAN: Marilee Garcia MD    HPI:      Ivy Rubio is a 74 y.o. with a chronic medical problem of breast cancer, dyslipidemia, hepatic cirrhosis, primary biliary cholangitis and nonalcoholic fatty liver was admitted to the hospital and 6/12/2024 due to sudden onset left upper and lower extremities numbness and weakness with left facial droop, CT did not show acute abnormality, CTA was unremarkable, MRI did show acute infarcts right precentral and postcentral gyri and bilateral posterior parietal lobes, left greater than right.   Patient was loaded with aspirin and Plavix and started to do well antiplatelet therapy for 21 days and now on Plavix only with Lipitor 80 mg.  On today visit, patient denies any weakness, or facial droop complaining of numbness over the left 3 finger that started at the time of the stroke, describes pin and needle sensation.  Phalen and Tinel test negative  Patient following with physical therapy and Occupational Therapy outpatient, patient said that her weakness much improved.  Patient is following with GI outpatient due to liver cirrhosis concerning for autoimmune etiology and cardiology for loop recorder.     PATIENT HISTORY:     Past Medical History:   Diagnosis Date    Abnormal EKG 12/2015    Chest pain     rare    Diverticulosis     History of bilateral breast cancer 10/2015    Primary biliary cholangitis (HCC)     Sees Dr. Harden    Short of breath on exertion     Urinary frequency     Wears glasses         Past Surgical History:   Procedure Laterality Date    APPENDECTOMY      BREAST BIOPSY Left 10/2015    cancer    BREAST IMPLANT REMOVAL  2019    CARDIAC PROCEDURE N/A 6/14/2024    Ciro during cath case performed by Corazon Link MD at

## 2024-08-07 NOTE — TELEPHONE ENCOUNTER
E-scribe requesting refill for Aspirin. Please review and e-scribe if applicable.     Last Visit Date: Never seen, last filled 06/15/2024 x0 refills    Next Visit Date: 02/12/2025

## 2024-08-07 NOTE — FLOWSHEET NOTE
on vertical pole and remove -   Green putty   Press cone into putty lrg and small end    Press marker into putty    Push small peg into each [place], then remove    Push 3.5in container into and twist cw/ccw -   -    -      X added   Velcro checkerboard   Remove black rotating between index and middle for pinch grasp, remove white rotating fingers by extension. Place back on x   Graded clips X20 each Red/green/blue Picking up [id number] blocks    1in x 1in blocks  -    X    buttons   Flipping buttons over x25,  all 25 hold in hand, place down one at a time X increase from 20   Velcroboard    #5 and #7 roll x4 with index and middle finger -   Power web  20 red , opposition, push/pull, flat hand -   stereognosis   6/7 identified reaching into pillow case -   BTE   Tool# 131 3# small steering wheel cw/ccw 45sec left hand  Tool#131 3# cw/ccw B hands cw/ccw 60sec  Tool#181 rope pull 10lbs 120 sec  Tool#162 gripping 50in lbs 60sec  Tool#162 pinch 35in lb 70sec  Tool#202 key 6lbs cw/ccw 30sec  Tool#502 wrist flex/ext 12lb 60 sec both directions  Tool#302 knob 15 in lbs 60sec cw/ccw  Tool#601 D handle 15 in lb 60 sec cw/ccw  Tool#301 small knob 5in lbs 45 sec cw/ccw -  -  -   x  x  -  X increased from 10 to 12  -   -   X added   Tan/red pegs   3 rows flipping pegs over -   Ergometer  7min BUE -   ADL cupboard   Remove plates 8 small, 6 large from cupboard and return with left hand -   Paperclip chain   Issued and reviewed for HEP 10 clips -   [place] 4   6 lrg and 6 small -   ADL Cloth   Buttons, snaps, hook, tying    Pt button and unbutton shirt -    -   Small pegs   Place every other, every other row. Place marble on top with right hand, then remove -   Pink putty   Placed 12 pegs in putty. Pinch with left to find -    Jars/containers   Remove lids from 4 jars/bottles - place 5 beads in each. Place lid on, remove beads -   towel   Finger walk around edge of towel -   Yellow ball x10  Press sides, top, lift overhead,

## 2024-08-08 ENCOUNTER — HOSPITAL ENCOUNTER (OUTPATIENT)
Age: 74
Discharge: HOME | End: 2024-08-08
Payer: MEDICARE

## 2024-08-08 DIAGNOSIS — I63.9 ISCHEMIC STROKE (HCC): ICD-10-CM

## 2024-08-08 LAB
CHOLEST SERPL-MCNC: 132 MG/DL
CHOLESTEROL/HDL RATIO: 2
HDLC SERPL-MCNC: 66 MG/DL
LDLC SERPL CALC-MCNC: 57 MG/DL (ref 0–130)
TRIGL SERPL-MCNC: 47 MG/DL

## 2024-08-08 PROCEDURE — 36415 COLL VENOUS BLD VENIPUNCTURE: CPT

## 2024-08-08 PROCEDURE — 80061 LIPID PANEL: CPT

## 2024-08-08 RX ORDER — ASPIRIN 81 MG
TABLET,CHEWABLE ORAL
Qty: 18 TABLET | Refills: 0 | OUTPATIENT
Start: 2024-08-08

## 2024-08-12 ENCOUNTER — HOSPITAL ENCOUNTER (OUTPATIENT)
Dept: OCCUPATIONAL THERAPY | Age: 74
Setting detail: THERAPIES SERIES
Discharge: HOME | End: 2024-08-12
Payer: MEDICARE

## 2024-08-12 PROCEDURE — 97110 THERAPEUTIC EXERCISES: CPT

## 2024-08-12 NOTE — FLOWSHEET NOTE
[facility]  Outpatient Rehabilitation &  Therapy  [address]     [phone]  F: [phone]   [] [facility]  Outpatient Rehabilitation &  Therapy  [address]   [address]  P: [phone]  F: [phone] [] [facility]  Outpatient Rehabilitation &  Therapy  [address]  P: [phone]  F: [phone]  [x] [facility]   Outpatient Rehabilitation & Therapy  [address]  P: [phone]   F: [phone]     Occupational Therapy Daily Treatment Note    Date:  2024  Patient Name:  Ivy Rubio    :  1950  MRN: [id number]  Physician: Physician: Adwoa Dill MD                                  Insurance: Medicare Part A & B   secondary MMO  Medical Diagnosis: I63.313 (ICD-10-CM) - 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       Objective:  Modalities:  Precautions: None  Exercises:  EXERCISE    REPS/     TIME  WEIGHT/    LEVEL COMMENTS Complete x   HEP   Pink theraputty - , roll, pinch, pull   Buttons/coins  Cards  Intrinsic stretch HEP added intrinsic   ROM   Digits left hand AROM  flex/extension, abduct/adduct, PROM  lt hand/wrist, PIP blocking exercise 2 sets 10x X    Flex bar 2x10 red Roll, isolated finger walk,U, reverse U, C, reverse C, twist x   Keyhole pegs   Left - in by 5 out by 5 -   Graded clips  red-black Place on vertical pole and remove x   Green

## 2024-08-13 ENCOUNTER — TELEPHONE (OUTPATIENT)
Dept: GASTROENTEROLOGY | Age: 74
End: 2024-08-13

## 2024-08-13 NOTE — TELEPHONE ENCOUNTER
Office received message from [facility] specialty pharmacy liaison, Dary.  Per Dary, patient is concerned about starting Ocaliva.      Writer called and spoke with pt directly and she would like to come in for an appt with Dr. Vega to discuss further about medication before she makes a decision.    Pending.    Thank you,    Ana Laura

## 2024-08-14 ENCOUNTER — HOSPITAL ENCOUNTER (OUTPATIENT)
Dept: OCCUPATIONAL THERAPY | Age: 74
Setting detail: THERAPIES SERIES
Discharge: HOME | End: 2024-08-14
Payer: MEDICARE

## 2024-08-14 PROCEDURE — 97110 THERAPEUTIC EXERCISES: CPT

## 2024-08-14 NOTE — THERAPY DISCHARGE
(eg peeling, cutting): 2   Driving: 3   Vacuuming, sweeping or raking: 3   Dressin   Doing up buttons: 3   Using tools or appliances: 4   Opening doors: 3   Cleaning: 3   Tying or lacing shoes: 3   Sleepin   Laundering clothes (eg washing, ironing, folding): 4   Opening a jar: 3   Throwing a ball: 4   Carrying a small suitcase with your affected limb: 3   UEFI Total Score 61   UEFI Total Percentage 76.25 %         STRENGTH     Right   (pounds) Left   (pounds) Left 24 Left 24    2 50 23 43 38   Lateral pinch 11 7 12 9   2 point pinch 10 3 8 5   3 jaw pinch 11 4.5 11 6         COORDINATION  - Speed/dexterity       Right   (seconds) Left   (seconds) Left 24 Left 24   9 Hole Peg Test 21 sec 58sec 32sec 37sec            Treatment Charges: Mins Units Time in - time out   []  Modalities:       []  Ultrasound      [x]  Ther Exercise 50 3    []  Manual Therapy      []  Ther Activities      []  Orthotic fit/train      []  Orthotic recheck      []  Other      Total Billable time 50           Assessment: [] Progressing toward goals.     [] No change.  [x] Other Discharge OT - Pt lucrecia session without adverse response. States fingers are throbbing some today. Pt has been doing well with all therapy sessions and home program. Met all goals at progress note other than UEFI score LTG. Added theraband for shoulder/elbow strengthening HEP this date. Pt with good understanding.       [] Patient would continue to benefit from skilled occupational therapy services in order to: Improve  functional /grasp, I with ADLS, Motor control/Tone in UE, ROM, Strength, Activity tolerance, and Coordination deficit in order to perform ADLs with increased safety, ensure good follow through, improve functional use of UE in ADL performance, and return to PLOF        STG/LTG     Short Term Goals: (  8    Treatments)  Increase AROM end ranges LUE to reach back for bathing and fastening bra MET 24 able to reach all

## 2024-08-14 NOTE — PROGRESS NOTES
24 1010   Today, do you or would you have any difficulty at all with:   Any of your usual work, housework, or school activities: 3   Your usual hobbies, re creational or sporting activities: 2   Lifting a bag of groceries to waist level: 3   Lifting a bag of groceries above your head: 2   Grooming your hair: 2   Pushing up on your hands (eg from bathtub or chair): 3   Preparing food (eg peeling, cutting): 2   Driving: 3   Vacuuming, sweeping or raking: 3   Dressin   Doing up buttons: 3   Using tools or appliances: 4   Opening doors: 3   Cleaning: 3   Tying or lacing shoes: 3   Sleepin   Laundering clothes (eg washing, ironing, folding): 4   Opening a jar: 3   Throwing a ball: 4   Carrying a small suitcase with your affected limb: 3   UEFI Total Score 61   UEFI Total Percentage 76.25 %

## 2024-08-20 ENCOUNTER — OFFICE VISIT (OUTPATIENT)
Dept: GASTROENTEROLOGY | Age: 74
End: 2024-08-20
Payer: MEDICARE

## 2024-08-20 VITALS
WEIGHT: 171 LBS | DIASTOLIC BLOOD PRESSURE: 79 MMHG | BODY MASS INDEX: 29.19 KG/M2 | HEIGHT: 64 IN | TEMPERATURE: 98.9 F | SYSTOLIC BLOOD PRESSURE: 148 MMHG | HEART RATE: 52 BPM

## 2024-08-20 DIAGNOSIS — K74.3 PRIMARY BILIARY CHOLANGITIS (HCC): Primary | ICD-10-CM

## 2024-08-20 PROCEDURE — G8399 PT W/DXA RESULTS DOCUMENT: HCPCS | Performed by: INTERNAL MEDICINE

## 2024-08-20 PROCEDURE — 1090F PRES/ABSN URINE INCON ASSESS: CPT | Performed by: INTERNAL MEDICINE

## 2024-08-20 PROCEDURE — G8417 CALC BMI ABV UP PARAM F/U: HCPCS | Performed by: INTERNAL MEDICINE

## 2024-08-20 PROCEDURE — G8427 DOCREV CUR MEDS BY ELIG CLIN: HCPCS | Performed by: INTERNAL MEDICINE

## 2024-08-20 PROCEDURE — 1123F ACP DISCUSS/DSCN MKR DOCD: CPT | Performed by: INTERNAL MEDICINE

## 2024-08-20 PROCEDURE — 1036F TOBACCO NON-USER: CPT | Performed by: INTERNAL MEDICINE

## 2024-08-20 PROCEDURE — 3017F COLORECTAL CA SCREEN DOC REV: CPT | Performed by: INTERNAL MEDICINE

## 2024-08-20 PROCEDURE — 99214 OFFICE O/P EST MOD 30 MIN: CPT | Performed by: INTERNAL MEDICINE

## 2024-08-20 RX ORDER — URSODIOL 500 MG/1
500 TABLET, FILM COATED ORAL 2 TIMES DAILY
Qty: 90 TABLET | Refills: 3 | Status: SHIPPED | OUTPATIENT
Start: 2024-08-20

## 2024-08-20 NOTE — PROGRESS NOTES
GI CLINIC FOLLOW UP    INTERVAL HISTORY:   No referring provider defined for this encounter.    Chief Complaint   Patient presents with    Follow-up     Primary biliary cholangitis            HISTORY OF PRESENT ILLNESS: Ms.Sandra FAVIOLA Rubio is a 74 y.o. female , referred for evaluation of PBC.    She follows our clinic for abnormal LFT's for which she had liver biopsy in 2022 which showed- PORTAL CHRONIC INFLAMMATION WITH MINIMAL PORTAL FIBROSIS AND MILD LOBULAR CHRONIC INFLAMMATION.   - MILD, PATCHY MACROVESICULAR STEATOSIS (FATTY LIVER CHANGE),  APPROXIMATELY 10%.   - CLINICAL PRIMARY BILIARY CHOLANGITIS (POSITIVE SERUM AMA).      She is on MUMTAZ 750 mg daily but her alk phos is till elevated and ALT/AST are also elevated.  We ordered Obetocholic acid but she was unable to get it as the copay is $4000.    Past Medical,Family, and Social History reviewed and does not contribute to the patient presentingcondition.    I did review all the labs results available for the labs which were ordered by the primary care physician, and the other consultants, we search on [employer] at [facility] and all the available care everywhere epic    I did review all the imaging studies of the abdomen available on EMR, ordered by the primary care physician and the other consultant    I did review all the pathology from the biopsies done on the previous endoscopies    Patient's PMH/PSH,SH,PSYCH Hx, MEDs, ALLERGIES, and ROS were all reviewed and updated in the appropriate sections.    PAST MEDICAL HISTORY:  Past Medical History:   Diagnosis Date    Abnormal EKG 12/2015    Chest pain     rare    Diverticulosis     History of bilateral breast cancer 10/2015    Primary biliary cholangitis (HCC)     Sees Dr. Harden    Short of breath on exertion     Urinary frequency     Wears glasses        Past Surgical History:   Procedure Laterality Date    APPENDECTOMY      BREAST BIOPSY Left 10/2015    cancer    BREAST IMPLANT REMOVAL  2019    CARDIAC

## 2024-09-18 DIAGNOSIS — K74.60 UNSPECIFIED CIRRHOSIS OF LIVER (HCC): ICD-10-CM

## 2024-09-18 DIAGNOSIS — K74.3 PRIMARY BILIARY CIRRHOSIS (HCC): ICD-10-CM

## 2024-09-18 RX ORDER — URSODIOL 500 MG/1
TABLET, FILM COATED ORAL
Qty: 135 TABLET | Refills: 5 | OUTPATIENT
Start: 2024-09-18

## 2024-09-23 ENCOUNTER — HOSPITAL ENCOUNTER (OUTPATIENT)
Age: 74
Discharge: HOME | End: 2024-09-23
Payer: MEDICARE

## 2024-09-23 DIAGNOSIS — K74.3 PRIMARY BILIARY CHOLANGITIS (HCC): ICD-10-CM

## 2024-09-23 LAB
ALBUMIN SERPL-MCNC: 3.9 G/DL (ref 3.5–5.2)
ALP SERPL-CCNC: 73 U/L (ref 35–104)
ALT SERPL-CCNC: 24 U/L (ref 5–33)
AST SERPL-CCNC: 23 U/L
BILIRUB DIRECT SERPL-MCNC: 0.1 MG/DL
BILIRUB INDIRECT SERPL-MCNC: 0.3 MG/DL (ref 0–1)
BILIRUB SERPL-MCNC: 0.4 MG/DL (ref 0.3–1.2)
PROT SERPL-MCNC: 7.5 G/DL (ref 6.4–8.3)

## 2024-09-23 PROCEDURE — 36415 COLL VENOUS BLD VENIPUNCTURE: CPT

## 2024-09-23 PROCEDURE — 80076 HEPATIC FUNCTION PANEL: CPT

## 2024-09-23 RX ORDER — URSODIOL 500 MG/1
500 TABLET, FILM COATED ORAL 2 TIMES DAILY
Qty: 180 TABLET | Refills: 3 | Status: SHIPPED | OUTPATIENT
Start: 2024-09-23

## 2024-09-24 ENCOUNTER — OFFICE VISIT (OUTPATIENT)
Dept: PODIATRY | Age: 74
End: 2024-09-24
Payer: MEDICARE

## 2024-09-24 VITALS — HEIGHT: 64 IN | BODY MASS INDEX: 28.68 KG/M2 | WEIGHT: 168 LBS

## 2024-09-24 DIAGNOSIS — B35.1 DERMATOPHYTOSIS OF NAIL: Primary | ICD-10-CM

## 2024-09-24 DIAGNOSIS — M19.071 OSTEOARTHRITIS OF RIGHT ANKLE AND FOOT: ICD-10-CM

## 2024-09-24 DIAGNOSIS — I73.9 PVD (PERIPHERAL VASCULAR DISEASE) (HCC): ICD-10-CM

## 2024-09-24 DIAGNOSIS — M79.605 PAIN IN BOTH LOWER EXTREMITIES: ICD-10-CM

## 2024-09-24 DIAGNOSIS — M79.604 PAIN IN BOTH LOWER EXTREMITIES: ICD-10-CM

## 2024-09-24 PROCEDURE — 11721 DEBRIDE NAIL 6 OR MORE: CPT | Performed by: PODIATRIST

## 2024-09-24 PROCEDURE — 3017F COLORECTAL CA SCREEN DOC REV: CPT | Performed by: PODIATRIST

## 2024-09-24 PROCEDURE — 99213 OFFICE O/P EST LOW 20 MIN: CPT | Performed by: PODIATRIST

## 2024-09-24 PROCEDURE — G8417 CALC BMI ABV UP PARAM F/U: HCPCS | Performed by: PODIATRIST

## 2024-09-24 PROCEDURE — 1036F TOBACCO NON-USER: CPT | Performed by: PODIATRIST

## 2024-09-24 PROCEDURE — G8399 PT W/DXA RESULTS DOCUMENT: HCPCS | Performed by: PODIATRIST

## 2024-09-24 PROCEDURE — G8427 DOCREV CUR MEDS BY ELIG CLIN: HCPCS | Performed by: PODIATRIST

## 2024-09-24 PROCEDURE — 1090F PRES/ABSN URINE INCON ASSESS: CPT | Performed by: PODIATRIST

## 2024-09-24 PROCEDURE — 1123F ACP DISCUSS/DSCN MKR DOCD: CPT | Performed by: PODIATRIST

## 2024-09-24 NOTE — PROGRESS NOTES
right ankle and foot              Plan:   Pt was evaluated and examined. Patient was given personalized discharge instructions.  Nails 1-10 were debrided in length and thickness sharply with a nail nipper and  without incident.     For the DJD  Recommend OTC anti inflammatories.  RICE therapy if pain worsens after activity   Recommend proper shoe gear with supportive archs.    All labs were reviewed and all imaging including the above findings were reviewed PRIOR to the patients arrival and with the patient today.    Previous patient encounter was reviewed.  Encounters from the patients other medical providers were reviewed and noted.   I personally interpreted the imaging and labs and discussed the results with the patient. Time was spent educating the patient and their families/caregivers on proper care of the feet and ankles.  All the above diagnosis were addressed at todays visit and all questions were answered.  A total of 20 minutes was spent with this patients encounter which included charting after the patients visit    Pt will follow up in 9 weeks or sooner if any problems arise. Diagnosis was discussed with the pt and all of their questions were answered in detail. Proper foot hygiene and care was discussed with the pt. Patient to check feet daily and contact the office with any questions/problems/concerns.  Other comorbidity noted and will be managed by PCP.  Pain waiver discussed with patient and confirmed.   9/24/2024      Electronically signed by Connie Ghosh DPM on 9/24/2024 at 10:35 AM  9/24/2024

## 2024-10-10 RX ORDER — CLOPIDOGREL BISULFATE 75 MG/1
75 TABLET ORAL DAILY
Qty: 90 TABLET | Refills: 5 | Status: SHIPPED | OUTPATIENT
Start: 2024-10-10

## 2024-10-22 ENCOUNTER — TELEPHONE (OUTPATIENT)
Dept: GASTROENTEROLOGY | Age: 74
End: 2024-10-22

## 2024-10-22 NOTE — TELEPHONE ENCOUNTER
Patient called inquiring about lab results done in September. Please review and call patient to discuss.

## 2024-12-01 DIAGNOSIS — I63.9 ISCHEMIC STROKE (HCC): ICD-10-CM

## 2024-12-04 RX ORDER — ATORVASTATIN CALCIUM 40 MG/1
40 TABLET, FILM COATED ORAL DAILY
Qty: 90 TABLET | Refills: 1 | Status: SHIPPED | OUTPATIENT
Start: 2024-12-04

## 2024-12-11 ENCOUNTER — OFFICE VISIT (OUTPATIENT)
Dept: GASTROENTEROLOGY | Age: 74
End: 2024-12-11
Payer: MEDICARE

## 2024-12-11 VITALS
SYSTOLIC BLOOD PRESSURE: 143 MMHG | HEIGHT: 64 IN | DIASTOLIC BLOOD PRESSURE: 70 MMHG | BODY MASS INDEX: 29.02 KG/M2 | WEIGHT: 170 LBS | HEART RATE: 51 BPM

## 2024-12-11 DIAGNOSIS — K74.3 PRIMARY BILIARY CHOLANGITIS (HCC): Primary | ICD-10-CM

## 2024-12-11 PROCEDURE — G8427 DOCREV CUR MEDS BY ELIG CLIN: HCPCS | Performed by: INTERNAL MEDICINE

## 2024-12-11 PROCEDURE — G8399 PT W/DXA RESULTS DOCUMENT: HCPCS | Performed by: INTERNAL MEDICINE

## 2024-12-11 PROCEDURE — 1126F AMNT PAIN NOTED NONE PRSNT: CPT | Performed by: INTERNAL MEDICINE

## 2024-12-11 PROCEDURE — 1036F TOBACCO NON-USER: CPT | Performed by: INTERNAL MEDICINE

## 2024-12-11 PROCEDURE — G8417 CALC BMI ABV UP PARAM F/U: HCPCS | Performed by: INTERNAL MEDICINE

## 2024-12-11 PROCEDURE — 99214 OFFICE O/P EST MOD 30 MIN: CPT | Performed by: INTERNAL MEDICINE

## 2024-12-11 PROCEDURE — 1123F ACP DISCUSS/DSCN MKR DOCD: CPT | Performed by: INTERNAL MEDICINE

## 2024-12-11 PROCEDURE — 1090F PRES/ABSN URINE INCON ASSESS: CPT | Performed by: INTERNAL MEDICINE

## 2024-12-11 PROCEDURE — 1159F MED LIST DOCD IN RCRD: CPT | Performed by: INTERNAL MEDICINE

## 2024-12-11 PROCEDURE — G8484 FLU IMMUNIZE NO ADMIN: HCPCS | Performed by: INTERNAL MEDICINE

## 2024-12-11 PROCEDURE — 3017F COLORECTAL CA SCREEN DOC REV: CPT | Performed by: INTERNAL MEDICINE

## 2024-12-11 NOTE — PROGRESS NOTES
GI CLINIC FOLLOW UP    INTERVAL HISTORY:   No referring provider defined for this encounter.    No chief complaint on file.          HISTORY OF PRESENT ILLNESS: Ms.Sandra FAVIOLA Rubio is a 74 y.o. female , referred for evaluation of PBC.    She follows our office for c/o- abnormal LFT's for which she had liver biopsy in 2022 which showed- PORTAL CHRONIC INFLAMMATION WITH MINIMAL PORTAL FIBROSIS AND MILD LOBULAR CHRONIC INFLAMMATION.   - MILD, PATCHY MACROVESICULAR STEATOSIS (FATTY LIVER CHANGE),  APPROXIMATELY 10%.   - CLINICAL PRIMARY BILIARY CHOLANGITIS (POSITIVE SERUM AMA).      She is on MUMTAZ 750 mg daily but her alk phos is till elevated and ALT/AST are also elevated. We increased the dose to 500 mg BID. Her latest LFT's are normal.    She feels better. Her tiredness has improved.    Past Medical,Family, and Social History reviewed and does not contribute to the patient presentingcondition.    I did review all the labs results available for the labs which were ordered by the primary care physician, and the other consultants, we search on [employer] at [facility] and all the available care everywhere epic    I did review all the imaging studies of the abdomen available on EMR, ordered by the primary care physician and the other consultant    I did review all the pathology from the biopsies done on the previous endoscopies    Patient's PMH/PSH,SH,PSYCH Hx, MEDs, ALLERGIES, and ROS were all reviewed and updated in the appropriate sections.    PAST MEDICAL HISTORY:  Past Medical History:   Diagnosis Date    Abnormal EKG 12/2015    Chest pain     rare    Diverticulosis     History of bilateral breast cancer 10/2015    Primary biliary cholangitis (HCC)     Sees Dr. Harden    Short of breath on exertion     Urinary frequency     Wears glasses        Past Surgical History:   Procedure Laterality Date    APPENDECTOMY      BREAST BIOPSY Left 10/2015    cancer    BREAST IMPLANT REMOVAL  2019    CARDIAC PROCEDURE N/A 6/14/2024

## 2025-01-24 ENCOUNTER — TELEPHONE (OUTPATIENT)
Dept: ONCOLOGY | Age: 75
End: 2025-01-24

## 2025-02-05 ENCOUNTER — HOSPITAL ENCOUNTER (OUTPATIENT)
Age: 75
Discharge: HOME | End: 2025-02-05
Payer: MEDICARE

## 2025-02-05 DIAGNOSIS — C50.212 MALIGNANT NEOPLASM OF UPPER-INNER QUADRANT OF LEFT FEMALE BREAST, UNSPECIFIED ESTROGEN RECEPTOR STATUS (HCC): ICD-10-CM

## 2025-02-05 DIAGNOSIS — E78.5 DYSLIPIDEMIA: ICD-10-CM

## 2025-02-05 LAB
ALBUMIN SERPL-MCNC: 4.1 G/DL (ref 3.5–5.2)
ALBUMIN/GLOB SERPL: 1.4 {RATIO} (ref 1–2.5)
ALP SERPL-CCNC: 70 U/L (ref 35–104)
ALT SERPL-CCNC: 199 U/L (ref 10–35)
ANION GAP SERPL CALCULATED.3IONS-SCNC: 9 MMOL/L (ref 9–16)
AST SERPL-CCNC: 110 U/L (ref 10–35)
BASOPHILS # BLD: 0.07 K/UL (ref 0–0.2)
BASOPHILS NFR BLD: 1 % (ref 0–2)
BILIRUB SERPL-MCNC: 0.6 MG/DL (ref 0–1.2)
BUN SERPL-MCNC: 18 MG/DL (ref 8–23)
CALCIUM SERPL-MCNC: 9.5 MG/DL (ref 8.6–10.4)
CHLORIDE SERPL-SCNC: 105 MMOL/L (ref 98–107)
CHOLEST SERPL-MCNC: 129 MG/DL (ref 0–199)
CHOLESTEROL/HDL RATIO: 1.9
CO2 SERPL-SCNC: 27 MMOL/L (ref 20–31)
CREAT SERPL-MCNC: 0.8 MG/DL (ref 0.6–0.9)
EOSINOPHIL # BLD: 0.23 K/UL (ref 0–0.44)
EOSINOPHILS RELATIVE PERCENT: 3 % (ref 1–4)
ERYTHROCYTE [DISTWIDTH] IN BLOOD BY AUTOMATED COUNT: 13.1 % (ref 11.8–14.4)
GFR, ESTIMATED: 78 ML/MIN/1.73M2
GLUCOSE SERPL-MCNC: 93 MG/DL (ref 74–99)
HCT VFR BLD AUTO: 43.1 % (ref 36.3–47.1)
HDLC SERPL-MCNC: 69 MG/DL
HGB BLD-MCNC: 13.5 G/DL (ref 11.9–15.1)
IMM GRANULOCYTES # BLD AUTO: <0.03 K/UL (ref 0–0.3)
IMM GRANULOCYTES NFR BLD: 0 %
LDLC SERPL CALC-MCNC: 52 MG/DL (ref 0–100)
LYMPHOCYTES NFR BLD: 2.53 K/UL (ref 1.1–3.7)
LYMPHOCYTES RELATIVE PERCENT: 36 % (ref 24–43)
MCH RBC QN AUTO: 29.9 PG (ref 25.2–33.5)
MCHC RBC AUTO-ENTMCNC: 31.3 G/DL (ref 28.4–34.8)
MCV RBC AUTO: 95.6 FL (ref 82.6–102.9)
MONOCYTES NFR BLD: 0.75 K/UL (ref 0.1–1.2)
MONOCYTES NFR BLD: 11 % (ref 3–12)
NEUTROPHILS NFR BLD: 49 % (ref 36–65)
NEUTS SEG NFR BLD: 3.51 K/UL (ref 1.5–8.1)
NRBC BLD-RTO: 0 PER 100 WBC
PLATELET # BLD AUTO: 372 K/UL (ref 138–453)
PMV BLD AUTO: 10.7 FL (ref 8.1–13.5)
POTASSIUM SERPL-SCNC: 4.4 MMOL/L (ref 3.7–5.3)
PROT SERPL-MCNC: 7.1 G/DL (ref 6.6–8.7)
RBC # BLD AUTO: 4.51 M/UL (ref 3.95–5.11)
SODIUM SERPL-SCNC: 141 MMOL/L (ref 136–145)
TRIGL SERPL-MCNC: 41 MG/DL
VLDLC SERPL CALC-MCNC: 8 MG/DL (ref 1–30)
WBC OTHER # BLD: 7.1 K/UL (ref 3.5–11.3)

## 2025-02-05 PROCEDURE — 80061 LIPID PANEL: CPT

## 2025-02-05 PROCEDURE — 85025 COMPLETE CBC W/AUTO DIFF WBC: CPT

## 2025-02-05 PROCEDURE — 36415 COLL VENOUS BLD VENIPUNCTURE: CPT

## 2025-02-05 PROCEDURE — 80053 COMPREHEN METABOLIC PANEL: CPT

## 2025-02-06 ENCOUNTER — TELEPHONE (OUTPATIENT)
Dept: ONCOLOGY | Age: 75
End: 2025-02-06

## 2025-02-06 ENCOUNTER — OFFICE VISIT (OUTPATIENT)
Dept: ONCOLOGY | Age: 75
End: 2025-02-06
Payer: MEDICARE

## 2025-02-06 VITALS
BODY MASS INDEX: 28.67 KG/M2 | OXYGEN SATURATION: 99 % | DIASTOLIC BLOOD PRESSURE: 86 MMHG | WEIGHT: 167 LBS | HEART RATE: 53 BPM | RESPIRATION RATE: 18 BRPM | TEMPERATURE: 97.6 F | SYSTOLIC BLOOD PRESSURE: 140 MMHG

## 2025-02-06 DIAGNOSIS — Z85.3 HX OF BREAST CANCER: Primary | ICD-10-CM

## 2025-02-06 DIAGNOSIS — Z90.13 STATUS POST BILATERAL MASTECTOMY: ICD-10-CM

## 2025-02-06 DIAGNOSIS — K74.3 PRIMARY BILIARY CHOLANGITIS (HCC): ICD-10-CM

## 2025-02-06 PROCEDURE — 99211 OFF/OP EST MAY X REQ PHY/QHP: CPT | Performed by: INTERNAL MEDICINE

## 2025-02-06 NOTE — PROGRESS NOTES
RADIOLOGICAL RESULTS:      DEXA:         T-SCORE:  05/31/2016 07/19/2018   LUMBAR:        -0.6  -1.0  LEFT HIP:         -0.9  -1.0   FEMORAL NECK: -1.1  -1.4    REVIEW OF LABORATORY DATA:  Lab Results   Component Value Date    WBC 7.1 02/05/2025    HGB 13.5 02/05/2025    HCT 43.1 02/05/2025    MCV 95.6 02/05/2025     02/05/2025       Chemistry        Component Value Date/Time     02/05/2025 0853    K 4.4 02/05/2025 0853     02/05/2025 0853    CO2 27 02/05/2025 0853    BUN 18 02/05/2025 0853    CREATININE 0.8 02/05/2025 0853        Component Value Date/Time    CALCIUM 9.5 02/05/2025 0853    ALKPHOS 70 02/05/2025 0853     (H) 02/05/2025 0853     (H) 02/05/2025 0853    BILITOT 0.6 02/05/2025 0853        PATHOLOGY:   02/23/2022:  LIVER, CORE NEEDLE BIOPSIES:   - PORTAL CHRONIC INFLAMMATION WITH MINIMAL PORTAL FIBROSIS AND MILD    LOBULAR CHRONIC INFLAMMATION.   - MILD, PATCHY MACROVESICULAR STEATOSIS (FATTY LIVER CHANGE),    APPROXIMATELY 10%.   - CLINICAL PRIMARY BILIARY CHOLANGITIS (POSITIVE SERUM AMA).   - NEGATIVE FOR MALIGNANCY.   - SEE COMMENT.     COMMENT: SEROLOGIC STUDIES FOR VIRAL HEPATITIS AND MASSIEL ARE NEGATIVE.   SERUM AMA IS POSITIVE, CONSISTENT WITH THE CLINICAL IMPRESSION OF   PRIMARY BILIARY CHOLANGITIS.  ACCORDING TO SCHEUER'S CLASSIFICATION OF   CHRONIC HEPATITIS, THE BIOPSIES DEMONSTRATE GRADE 2 (PORTAL AND   LOBULAR) CHRONIC INFLAMMATION AND STAGE 1 FIBROSIS.  THERE IS NO   EVIDENCE OF MALIGNANCY.      IMPRESSION:   Stage I (T1b,sN0,M0) ER and MT positive breast cancer. 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   Side effects of Arimidex as discussed. Seems to be well tolerated   Osteopenia. On calcium and vitamin D   S/P removal of both breast implants  Primary biliary cholangitis        PLAN:   We reviewed her pathology which shows primary

## 2025-02-12 ENCOUNTER — OFFICE VISIT (OUTPATIENT)
Dept: NEUROLOGY | Age: 75
End: 2025-02-12

## 2025-02-12 VITALS
HEART RATE: 57 BPM | HEIGHT: 64 IN | BODY MASS INDEX: 28 KG/M2 | DIASTOLIC BLOOD PRESSURE: 86 MMHG | SYSTOLIC BLOOD PRESSURE: 152 MMHG | WEIGHT: 164 LBS

## 2025-02-12 DIAGNOSIS — G56.02 CARPAL TUNNEL SYNDROME OF LEFT WRIST: ICD-10-CM

## 2025-02-12 DIAGNOSIS — I63.9 ISCHEMIC STROKE (HCC): Primary | ICD-10-CM

## 2025-02-12 DIAGNOSIS — I63.9 ISCHEMIC STROKE (HCC): ICD-10-CM

## 2025-02-12 RX ORDER — ATORVASTATIN CALCIUM 10 MG/1
20 TABLET, FILM COATED ORAL DAILY
Qty: 30 TABLET | Refills: 3 | Status: SHIPPED | OUTPATIENT
Start: 2025-02-12

## 2025-02-12 NOTE — PROGRESS NOTES
[address] [facility], [facility] [address]  [place] [address]  P: [phone]  F: [phone]    NEUROLOGY CLINIC NOTE     PATIENT NAME: Ivy Rubio  PATIENT MRN: [id number]  PRIMARY CARE PHYSICIAN: Marilee Garcia MD    HPI:      Ivy Rubio is a 74 y.o. with a chronic medical problem of breast cancer, dyslipidemia, hepatic cirrhosis, primary biliary cholangitis and nonalcoholic fatty liver was admitted to the hospital and 6/12/2024 due to sudden onset left upper and lower extremities numbness and weakness with left facial droop, CT did not show acute abnormality, CTA was unremarkable, MRI did show acute infarcts right precentral and postcentral gyri and bilateral posterior parietal lobes, left greater than right.   Patient was loaded with aspirin and Plavix and started to do well antiplatelet therapy for 21 days and now on Plavix only with Lipitor 80 mg.  Status post MARCO A and loop recorder, following with cardiology with no A-fib report and plan to follow-up 1 year after initial visit    On 8/7/2024 visit, patient denies any weakness, or facial droop complaining of numbness over the left 3 finger that started at the time of the stroke, describes pin and needle sensation.  Phalen and Tinel test negative  Patient following with physical therapy and Occupational Therapy outpatient, patient said that her weakness much improved.    On 2/12/2024, the patient came in for a follow-up visit and reported no new symptoms. She continues to experience numbness in the three fingers of her left hand and has a positive Phalen's sign. She was previously given a wrist splint, which she noted has resulted in mild improvement. The patient was offered a NSAID, but she stated she will continue using the wrist splint. The patient is taking Lipitor 40 mg, and her liver function tests (LFTs) are elevated. Her most recent lipid panel showed an LDL of 52, triglycerides of 41, and

## 2025-02-17 RX ORDER — SIMVASTATIN 20 MG
TABLET ORAL
Refills: 0 | OUTPATIENT
Start: 2025-02-17

## 2025-03-10 ENCOUNTER — HOSPITAL ENCOUNTER (OUTPATIENT)
Age: 75
Discharge: HOME | End: 2025-03-10
Payer: MEDICARE

## 2025-03-10 DIAGNOSIS — K74.3 PRIMARY BILIARY CHOLANGITIS (HCC): ICD-10-CM

## 2025-03-10 LAB
ALBUMIN SERPL-MCNC: 3.9 G/DL (ref 3.5–5.2)
ALP SERPL-CCNC: 62 U/L (ref 35–104)
ALT SERPL-CCNC: 87 U/L (ref 10–35)
AST SERPL-CCNC: 51 U/L (ref 10–35)
BILIRUB DIRECT SERPL-MCNC: 0.2 MG/DL (ref 0–0.3)
BILIRUB INDIRECT SERPL-MCNC: 0.2 MG/DL (ref 0–1)
BILIRUB SERPL-MCNC: 0.4 MG/DL (ref 0–1.2)
PROT SERPL-MCNC: 6.9 G/DL (ref 6.6–8.7)

## 2025-03-10 PROCEDURE — 36415 COLL VENOUS BLD VENIPUNCTURE: CPT

## 2025-03-10 PROCEDURE — 80076 HEPATIC FUNCTION PANEL: CPT

## 2025-04-09 ENCOUNTER — OFFICE VISIT (OUTPATIENT)
Dept: GASTROENTEROLOGY | Age: 75
End: 2025-04-09
Payer: MEDICARE

## 2025-04-09 VITALS
HEIGHT: 64 IN | WEIGHT: 164 LBS | DIASTOLIC BLOOD PRESSURE: 83 MMHG | HEART RATE: 53 BPM | BODY MASS INDEX: 28 KG/M2 | SYSTOLIC BLOOD PRESSURE: 150 MMHG

## 2025-04-09 DIAGNOSIS — K74.3 PRIMARY BILIARY CHOLANGITIS (HCC): Primary | ICD-10-CM

## 2025-04-09 PROCEDURE — 1123F ACP DISCUSS/DSCN MKR DOCD: CPT | Performed by: INTERNAL MEDICINE

## 2025-04-09 PROCEDURE — 1090F PRES/ABSN URINE INCON ASSESS: CPT | Performed by: INTERNAL MEDICINE

## 2025-04-09 PROCEDURE — 1126F AMNT PAIN NOTED NONE PRSNT: CPT | Performed by: INTERNAL MEDICINE

## 2025-04-09 PROCEDURE — 1159F MED LIST DOCD IN RCRD: CPT | Performed by: INTERNAL MEDICINE

## 2025-04-09 PROCEDURE — 1036F TOBACCO NON-USER: CPT | Performed by: INTERNAL MEDICINE

## 2025-04-09 PROCEDURE — G8427 DOCREV CUR MEDS BY ELIG CLIN: HCPCS | Performed by: INTERNAL MEDICINE

## 2025-04-09 PROCEDURE — G8399 PT W/DXA RESULTS DOCUMENT: HCPCS | Performed by: INTERNAL MEDICINE

## 2025-04-09 PROCEDURE — 99214 OFFICE O/P EST MOD 30 MIN: CPT | Performed by: INTERNAL MEDICINE

## 2025-04-09 PROCEDURE — G8417 CALC BMI ABV UP PARAM F/U: HCPCS | Performed by: INTERNAL MEDICINE

## 2025-04-09 PROCEDURE — 3017F COLORECTAL CA SCREEN DOC REV: CPT | Performed by: INTERNAL MEDICINE

## 2025-04-09 NOTE — PROGRESS NOTES
chewable tablet, Take 1 tablet by mouth daily for 18 days (Patient not taking: Reported on 2/6/2025), Disp: 18 tablet, Rfl: 0    clindamycin (CLEOCIN) 150 MG capsule, Take 1 capsule by mouth 3 times daily, Disp: , Rfl:     Magnesium 500 MG CAPS, 1 tablet with a meal Orally Once a day, Disp: , Rfl:     polyethyl glycol-propyl glycol 0.4-0.3 % (SYSTANE ULTRA) 0.4-0.3 % ophthalmic solution, Place 1 drop into both eyes as needed for Dry Eyes, Disp: , Rfl: 0    NONFORMULARY, Liver detox, Disp: , Rfl:     Mastectomy Bra MISC, by Does not apply route, Disp: 1 each, Rfl: 0    Coenzyme Q10 (CO Q-10 PO), Take 1 capsule by mouth daily, Disp: , Rfl:     Ginger, Zingiber officinalis, (GINGER ROOT) 550 MG CAPS, Take 550 mg by mouth as needed, Disp: , Rfl:     NONFORMULARY, Take 500 mg by mouth daily Indications: Tumeric Capsules OTC, Disp: , Rfl:     fexofenadine (ALLEGRA ALLERGY) 180 MG tablet, Take 1 tablet by mouth daily (Patient taking differently: Take 1 tablet by mouth daily as needed), Disp: 30 tablet, Rfl: 1    ibuprofen (ADVIL;MOTRIN) 600 MG tablet, Take 1 tablet by mouth every 6 hours as needed for Pain, Disp: 120 tablet, Rfl: 1    CALCIUM & MAGNESIUM CARBONATES PO, Take 1 tablet by mouth daily , Disp: , Rfl:     NONFORMULARY, Take 1 capsule by mouth 2 times daily (with meals) [facility]: Natural plant sterols, Disp: , Rfl:     Multiple Vitamins-Minerals (MULTI VITAMIN/MINERALS PO), Take 1 tablet by mouth daily , Disp: , Rfl:     Psyllium (METAMUCIL PO), Take by mouth fibermucil brand, Disp: , Rfl:     NONFORMULARY, Take 1 capsule by mouth 2 times daily Circulation and vein support, Disp: , Rfl:     Omega-3 Fatty Acids (OMEGA 3 PO), Take 1 capsule by mouth daily  mg,  mg, DHA 50 mg , Disp: , Rfl:     ALLERGIES:   Allergies   Allergen Reactions    Ciprofloxacin Hcl Itching     tendonitis    Coffee Bean Extract [Coffea Arabica] Nausea Only    Darvon [Propoxyphene] Other (See Comments)     Patient relates

## 2025-05-09 ENCOUNTER — HOSPITAL ENCOUNTER (OUTPATIENT)
Age: 75
Discharge: HOME | End: 2025-05-09
Payer: MEDICARE

## 2025-05-09 DIAGNOSIS — K74.3 PRIMARY BILIARY CHOLANGITIS (HCC): ICD-10-CM

## 2025-05-09 LAB
ALBUMIN SERPL-MCNC: 4.1 G/DL (ref 3.5–5.2)
ALP SERPL-CCNC: 74 U/L (ref 35–104)
ALT SERPL-CCNC: 119 U/L (ref 10–35)
AST SERPL-CCNC: 73 U/L (ref 10–35)
BILIRUB DIRECT SERPL-MCNC: <0.1 MG/DL (ref 0–0.3)
BILIRUB INDIRECT SERPL-MCNC: ABNORMAL MG/DL (ref 0–1)
BILIRUB SERPL-MCNC: 0.3 MG/DL (ref 0–1.2)
PROT SERPL-MCNC: 7.4 G/DL (ref 6.6–8.7)

## 2025-05-09 PROCEDURE — 80076 HEPATIC FUNCTION PANEL: CPT

## 2025-05-09 PROCEDURE — 36415 COLL VENOUS BLD VENIPUNCTURE: CPT

## 2025-05-14 ENCOUNTER — RESULTS FOLLOW-UP (OUTPATIENT)
Dept: GASTROENTEROLOGY | Age: 75
End: 2025-05-14

## 2025-05-14 DIAGNOSIS — R79.89 ELEVATED LFTS: Primary | ICD-10-CM

## 2025-05-14 RX ORDER — MULTIVIT WITH MINERALS/LUTEIN
1000 TABLET ORAL DAILY
Qty: 30 CAPSULE | Refills: 1 | Status: SHIPPED | OUTPATIENT
Start: 2025-05-14

## 2025-05-14 NOTE — TELEPHONE ENCOUNTER
----- Message from Dr. Daniel Vega MD sent at 5/14/2025  9:04 AM EDT -----  I ordered Vitaamin E for her which she will take for 2 months only and then we will repeat LFT's  ----- Message -----  From: Ram[facility] Banks [facility]  Sent: 5/9/2025   9:19 AM EDT  To: Daniel Vega MD

## 2025-05-14 NOTE — TELEPHONE ENCOUNTER
Writer contacted patient and relayed information, patient voiced understanding.    LFT labs have been placed for patient to repeat in 2 months.

## 2025-06-02 ENCOUNTER — HOSPITAL ENCOUNTER (OUTPATIENT)
Dept: NEUROLOGY | Age: 75
Discharge: HOME | End: 2025-06-02
Payer: MEDICARE

## 2025-06-02 PROCEDURE — 95886 MUSC TEST DONE W/N TEST COMP: CPT | Performed by: PHYSICAL MEDICINE & REHABILITATION

## 2025-06-02 PROCEDURE — 95910 NRV CNDJ TEST 7-8 STUDIES: CPT | Performed by: PHYSICAL MEDICINE & REHABILITATION

## 2025-06-10 RX ORDER — MULTIVIT WITH MINERALS/LUTEIN
TABLET ORAL DAILY
Qty: 90 CAPSULE | Refills: 1 | Status: SHIPPED | OUTPATIENT
Start: 2025-06-10

## 2025-06-20 ENCOUNTER — RESULTS FOLLOW-UP (OUTPATIENT)
Dept: NEUROLOGY | Age: 75
End: 2025-06-20

## 2025-07-24 NOTE — FLOWSHEET NOTE
[facility]   [address]   Phone: [phone]   Fax: [phone]    Physical Therapy Daily Treatment Note      Date:  2024  Patient Name:  Ivy Rubio    :  1950  MRN: [id number]  Physician: Adwoa Dill MD (Dr. 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  Patient reports she has tried go up and down stairs while holding object but has found herself leaning on the handrails to assist with going up and down stairs.      Pain:  [] Yes  [x] No Location: NA Pain Rating: (0-10 scale) 0/10  Pain altered Tx:  [] No  [] Yes  Action:  Comments:    Objective:    INTERVENTIONS  Reps/ Time Weight/ Level Completed  Today Comments               EXERCISES            Standing:       3 way hip kicks  15x ea  Red  x Done without UE support to challenge balance    Squat with TB 10x 2 Red x Cues to sit back more; TC and chair for cueing needed- fair carry over   Resisted side step  3x15ft ea Red  x     Resisted fwd monster walks 2 laps red x 7/2 added   Step ups fwd/lat 2x10 ea  8in x  7/2 no UE support   Fwd lunges to bosu  10x ea  x     Lateral lunges to bosu  10x ea   x            Stair negotiation 1 flight   R rail only   Stair negotiation w/ object in hand 1 flight    4# basket x No UE support ascending or descending   Gait with fitter board + ball balance 95ft 2# ball x No LOB, good control with balancing ball on board                      Patient Education/Home Program :   : Access Code: [email or web address]    Physical Therapy Treatment Note   [facility]  [address]  P: [phone]   F: [phone]     Date:  2025  Patient: Swapnil De La Garza    : 1965    MRN: [id number]  Referring Provider:  Kevin Del Rosario MD    Insurance/Therapy Benefits: Care source Medicaid/auth required approved for 12 visits  Visit Diagnoses:    Pelvic Pain R10.2,   lumbar pain M54.59,   generalized abdominal pain R10.84  Urgency of urination R39.15    Rehab Codes: Pelvic Pain R10.2,   lumbar pain M54.59,   generalized abdominal pain R10.84  Urgency of urination R39.15   Onset Date: 2025    Next 's appt.: 06/10/25  Visit# / total visits: ;      Cancels/No Shows: 0/0      Subjective:  Swapnil De La Garza  (: 1965  is a 60 y.o.  y.o. male ,. Patient states his abdominal pain is elevated a little with tightness re-occurring.   Pt had increased frequency over the last week [facility] and no UTI not sure if stress related or what.  Pt did have PET scan and has not got results yet.  Will see Dr Next week to schedule surgery to remove prostate      Pain:  [x] Yes  [] No   Location: abdomen,    Pain Rating: (0-10 scale) 6-7/10  Pain altered Tx:  [x] No  [] Yes  Action:    Objective:   Precautions [x] No  [] Yes:   Pt has prostate cancer will be setting up surgery for removal at the end of July when he returns to the dr    Treatment:  Manual Therapy:  30 MIN  MFR to bilateral upper and lower abdomen   Theract 15min  reviewed POC, and educated pt on after care of surgery      Assessment:  Pt had moderate tightness noted across bilateral upper and lower abdomen decreased after MFR.  No cupping this date due to  prostate CA.  Pt tolerated treatment well with decrease tightness and pain noted after treatment.  Pt will continue to benefit from skilled PT to increase mobility decrease frequency and pain     GOALS: To be met in 6 visits  Functional Goals  Progress

## 2025-08-06 ENCOUNTER — HOSPITAL ENCOUNTER (OUTPATIENT)
Dept: LAB | Age: 75
Discharge: HOME | End: 2025-08-06
Payer: MEDICARE

## 2025-08-06 ENCOUNTER — OFFICE VISIT (OUTPATIENT)
Dept: GASTROENTEROLOGY | Age: 75
End: 2025-08-06
Payer: MEDICARE

## 2025-08-06 VITALS
OXYGEN SATURATION: 97 % | SYSTOLIC BLOOD PRESSURE: 145 MMHG | BODY MASS INDEX: 26.94 KG/M2 | RESPIRATION RATE: 16 BRPM | TEMPERATURE: 97.4 F | HEART RATE: 58 BPM | HEIGHT: 64 IN | DIASTOLIC BLOOD PRESSURE: 92 MMHG | WEIGHT: 157.8 LBS

## 2025-08-06 DIAGNOSIS — K74.3 PRIMARY BILIARY CHOLANGITIS (HCC): ICD-10-CM

## 2025-08-06 DIAGNOSIS — K74.3 PRIMARY BILIARY CHOLANGITIS (HCC): Primary | ICD-10-CM

## 2025-08-06 LAB
ALBUMIN SERPL-MCNC: 4 G/DL (ref 3.5–5.2)
ALP SERPL-CCNC: 59 U/L (ref 35–104)
ALT SERPL-CCNC: 33 U/L (ref 10–35)
AST SERPL-CCNC: 28 U/L (ref 10–35)
BILIRUB DIRECT SERPL-MCNC: 0.1 MG/DL (ref 0–0.3)
BILIRUB INDIRECT SERPL-MCNC: 0.3 MG/DL (ref 0–1)
BILIRUB SERPL-MCNC: 0.4 MG/DL (ref 0–1.2)
PROT SERPL-MCNC: 7 G/DL (ref 6.6–8.7)

## 2025-08-06 PROCEDURE — 99214 OFFICE O/P EST MOD 30 MIN: CPT | Performed by: INTERNAL MEDICINE

## 2025-08-06 PROCEDURE — 1090F PRES/ABSN URINE INCON ASSESS: CPT | Performed by: INTERNAL MEDICINE

## 2025-08-06 PROCEDURE — 1159F MED LIST DOCD IN RCRD: CPT | Performed by: INTERNAL MEDICINE

## 2025-08-06 PROCEDURE — 80076 HEPATIC FUNCTION PANEL: CPT

## 2025-08-06 PROCEDURE — 1036F TOBACCO NON-USER: CPT | Performed by: INTERNAL MEDICINE

## 2025-08-06 PROCEDURE — 3017F COLORECTAL CA SCREEN DOC REV: CPT | Performed by: INTERNAL MEDICINE

## 2025-08-06 PROCEDURE — 1123F ACP DISCUSS/DSCN MKR DOCD: CPT | Performed by: INTERNAL MEDICINE

## 2025-08-06 PROCEDURE — G8417 CALC BMI ABV UP PARAM F/U: HCPCS | Performed by: INTERNAL MEDICINE

## 2025-08-06 PROCEDURE — 36415 COLL VENOUS BLD VENIPUNCTURE: CPT

## 2025-08-06 PROCEDURE — G8427 DOCREV CUR MEDS BY ELIG CLIN: HCPCS | Performed by: INTERNAL MEDICINE

## 2025-08-06 PROCEDURE — G8399 PT W/DXA RESULTS DOCUMENT: HCPCS | Performed by: INTERNAL MEDICINE

## 2025-08-06 PROCEDURE — 1126F AMNT PAIN NOTED NONE PRSNT: CPT | Performed by: INTERNAL MEDICINE

## 2025-08-18 ENCOUNTER — OFFICE VISIT (OUTPATIENT)
Dept: NEUROLOGY | Age: 75
End: 2025-08-18
Payer: MEDICARE

## 2025-08-18 VITALS
BODY MASS INDEX: 26.63 KG/M2 | DIASTOLIC BLOOD PRESSURE: 80 MMHG | WEIGHT: 156 LBS | SYSTOLIC BLOOD PRESSURE: 152 MMHG | HEIGHT: 64 IN | HEART RATE: 53 BPM

## 2025-08-18 DIAGNOSIS — M54.81 BILATERAL OCCIPITAL NEURALGIA: ICD-10-CM

## 2025-08-18 DIAGNOSIS — I63.9 ISCHEMIC STROKE (HCC): Primary | ICD-10-CM

## 2025-08-18 PROCEDURE — 1159F MED LIST DOCD IN RCRD: CPT

## 2025-08-18 PROCEDURE — 1126F AMNT PAIN NOTED NONE PRSNT: CPT

## 2025-08-18 PROCEDURE — G8399 PT W/DXA RESULTS DOCUMENT: HCPCS

## 2025-08-18 PROCEDURE — 3017F COLORECTAL CA SCREEN DOC REV: CPT

## 2025-08-18 PROCEDURE — 1036F TOBACCO NON-USER: CPT

## 2025-08-18 PROCEDURE — 1123F ACP DISCUSS/DSCN MKR DOCD: CPT

## 2025-08-18 PROCEDURE — G8427 DOCREV CUR MEDS BY ELIG CLIN: HCPCS

## 2025-08-18 PROCEDURE — 1090F PRES/ABSN URINE INCON ASSESS: CPT

## 2025-08-18 PROCEDURE — G8417 CALC BMI ABV UP PARAM F/U: HCPCS

## 2025-08-18 PROCEDURE — 99214 OFFICE O/P EST MOD 30 MIN: CPT

## (undated) DEVICE — LIQUIBAND RAPID ADHESIVE 36/CS 0.8ML: Brand: MEDLINE

## (undated) DEVICE — GLOVE SURG SZ 65 STD WHT LTX SYN POLYMER BEAD REINF ANTI RL

## (undated) DEVICE — ULTRACLEAN ACCESSORY ELECTRODE 1" (2.54 CM) COATED BLADE WITH EXTENDED INSULATION: Brand: ULTRACLEAN

## (undated) DEVICE — PAD,ABDOMINAL,8"X7.5",ST,LF,20/BX: Brand: MEDLINE INDUSTRIES, INC.

## (undated) DEVICE — SURGICAL PROCEDURE TRAY DRSG CHNG DISP

## (undated) DEVICE — [TOMCAT CUTTER, ARTHROSCOPIC SHAVER BLADE,  DO NOT RESTERILIZE,  DO NOT USE IF PACKAGE IS DAMAGED,  KEEP DRY,  KEEP AWAY FROM SUNLIGHT]: Brand: FORMULA

## (undated) DEVICE — BANDAGE,GAUZE,BULKEE II,4.5"X4.1YD,STRL: Brand: MEDLINE

## (undated) DEVICE — GOWN,SIRUS,NONRNF,SETINSLV,XL,20/CS: Brand: MEDLINE

## (undated) DEVICE — SINGLE PORT MANIFOLD: Brand: NEPTUNE 2

## (undated) DEVICE — SUTURE ETHLN SZ 3-0 L18IN NONABSORBABLE BLK FS-1 L24MM 3/8 663H

## (undated) DEVICE — GLOVE ORANGE PI 7   MSG9070

## (undated) DEVICE — SUTURE MCRYL + SZ 4-0 L27IN ABSRB UD L19MM PS-2 3/8 CIR MCP426H

## (undated) DEVICE — SYRINGE IRRIG 60ML SFT PLIABLE BLB EZ TO GRP 1 HND USE W/

## (undated) DEVICE — HYPODERMIC SAFETY NEEDLE: Brand: MAGELLAN

## (undated) DEVICE — GOWN,AURORA,NONREINFORCED,LARGE: Brand: MEDLINE

## (undated) DEVICE — AIRLIFE™ NASAL OXYGEN CANNULA CURVED, FLARED TIP, WITH 7 FEET (2.1 M) CRUSH RESISTANT TUBING, OVER-THE-EAR STYLE: Brand: AIRLIFE™

## (undated) DEVICE — ZIMMER® STERILE DISPOSABLE TOURNIQUET CUFF WITH PLC, DUAL PORT, SINGLE BLADDER, 30 IN. (76 CM)

## (undated) DEVICE — SUTURE V-LOC 180 SZ 3-0 L12IN ABSRB GRN V-20 L26MM 1/2 CIR VLOCL0614

## (undated) DEVICE — STAPLER SKIN SQ 30 ABSRB STPL DISP INSORB

## (undated) DEVICE — SUTURE VCRL SZ 0 L36IN ABSRB UD CT-1 L36MM 1/2 CIR TAPR PNT VCP946H

## (undated) DEVICE — GLOVE ORTHO 8   MSG9480

## (undated) DEVICE — GLOVE,SURG,TRIUMPH MICRO,LTX,PF,7.5: Brand: MEDLINE

## (undated) DEVICE — 3M™ WARMING BLANKET, LOWER BODY, 10 PER CASE, 42568: Brand: BAIR HUGGER™

## (undated) DEVICE — GLOVE SURG SZ 75 STD WHT LTX SYN POLYMER BEAD REINF ANTI RL

## (undated) DEVICE — PROVE COVER: Brand: UNBRANDED

## (undated) DEVICE — 35 ML SYRINGE LUER-LOCK TIP: Brand: MONOJECT

## (undated) DEVICE — BRA SURG L FOR 38-40IN CHST 96% COT 4% SPANDEX KNIT FAB

## (undated) DEVICE — SUTURE V-LOC 180 3-0 L18IN ABSRB GRN V-20 L26MM 1/2 CIR VLOCL0624

## (undated) DEVICE — DRESSING,GAUZE,XEROFORM,CURAD,1"X8",ST: Brand: CURAD

## (undated) DEVICE — SYRINGE MED 50ML LUERLOCK TIP

## (undated) DEVICE — ST CHARLES KNEE ARTHRO: Brand: MEDLINE INDUSTRIES, INC.

## (undated) DEVICE — DRAPE,REIN 53X77,STERILE: Brand: MEDLINE

## (undated) DEVICE — SPONGE LAP W18XL18IN WHT COT 4 PLY FLD STRUNG RADPQ DISP ST

## (undated) DEVICE — Device

## (undated) DEVICE — Z DISCONTINUED USE 2220190 SUTURE VICRYL SZ 3-0 L27IN ABSRB UD L26MM SH 1/2 CIR J416H

## (undated) DEVICE — NEEDLE SPNL L3.5IN PNK HUB S STL REG WALL FIT STYL W/ QNCKE

## (undated) DEVICE — BLADE SURG NO10 S STL STR DISP GLASSVAN

## (undated) DEVICE — STRIP,CLOSURE,WOUND,MEDI-STRIP,1/2X4: Brand: MEDLINE

## (undated) DEVICE — SOLUTION IV IRRIG LACTATED RINGERS 3000ML 2B7487